# Patient Record
Sex: FEMALE | Race: WHITE | NOT HISPANIC OR LATINO | Employment: UNEMPLOYED | URBAN - METROPOLITAN AREA
[De-identification: names, ages, dates, MRNs, and addresses within clinical notes are randomized per-mention and may not be internally consistent; named-entity substitution may affect disease eponyms.]

---

## 2023-08-25 LAB
ANION GAP IN SER/PLAS: 14 MMOL/L (ref 10–20)
CALCIUM (MG/DL) IN SER/PLAS: 9.2 MG/DL (ref 8.6–10.3)
CARBON DIOXIDE, TOTAL (MMOL/L) IN SER/PLAS: 26 MMOL/L (ref 21–32)
CHLORIDE (MMOL/L) IN SER/PLAS: 104 MMOL/L (ref 98–107)
CREATININE (MG/DL) IN SER/PLAS: 0.98 MG/DL (ref 0.5–1.05)
GFR FEMALE: 59 ML/MIN/1.73M2
GLUCOSE (MG/DL) IN SER/PLAS: 88 MG/DL (ref 74–99)
POTASSIUM (MMOL/L) IN SER/PLAS: 4.8 MMOL/L (ref 3.5–5.3)
SODIUM (MMOL/L) IN SER/PLAS: 139 MMOL/L (ref 136–145)
UREA NITROGEN (MG/DL) IN SER/PLAS: 27 MG/DL (ref 6–23)

## 2023-11-09 DIAGNOSIS — Z95.810 CARDIAC DEFIBRILLATOR IN PLACE: Primary | ICD-10-CM

## 2023-11-12 PROBLEM — I10 HYPERTENSION: Status: ACTIVE | Noted: 2023-11-12

## 2023-11-12 PROBLEM — R42 VERTIGO: Status: ACTIVE | Noted: 2023-11-12

## 2023-11-12 PROBLEM — R06.02 SHORTNESS OF BREATH: Status: ACTIVE | Noted: 2023-11-12

## 2023-11-12 PROBLEM — I25.10 CORONARY ARTERY DISEASE INVOLVING NATIVE CORONARY ARTERY OF NATIVE HEART WITHOUT ANGINA PECTORIS: Status: ACTIVE | Noted: 2023-11-12

## 2023-11-12 PROBLEM — Z98.61 CAD S/P PERCUTANEOUS CORONARY ANGIOPLASTY: Status: ACTIVE | Noted: 2023-11-12

## 2023-11-12 PROBLEM — S92.902A FRACTURE OF FOOT, LEFT, CLOSED, INITIAL ENCOUNTER: Status: ACTIVE | Noted: 2023-11-12

## 2023-11-12 PROBLEM — Z95.810 CARDIAC DEFIBRILLATOR IN PLACE: Status: ACTIVE | Noted: 2023-11-12
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
## 2023-11-12 PROBLEM — M19.90 ARTHRITIS: Status: ACTIVE | Noted: 2023-11-12

## 2023-11-12 PROBLEM — I10 BENIGN ESSENTIAL HYPERTENSION: Status: ACTIVE | Noted: 2023-11-12

## 2023-11-12 PROBLEM — R73.9 HYPERGLYCEMIA: Status: ACTIVE | Noted: 2023-11-12

## 2023-11-12 PROBLEM — M54.30 SCIATIC PAIN: Status: ACTIVE | Noted: 2023-11-12

## 2023-11-12 PROBLEM — M75.80 ROTATOR CUFF TENDONITIS: Status: ACTIVE | Noted: 2023-11-12

## 2023-11-12 PROBLEM — S92.909A FRACTURE OF FOOT: Status: ACTIVE | Noted: 2023-11-12

## 2023-11-12 PROBLEM — K21.9 GASTROESOPHAGEAL REFLUX DISEASE: Status: ACTIVE | Noted: 2023-11-12

## 2023-11-12 PROBLEM — S99.912A ANKLE INJURY, LEFT, INITIAL ENCOUNTER: Status: ACTIVE | Noted: 2023-11-12

## 2023-11-12 PROBLEM — I50.22 CHRONIC SYSTOLIC HEART FAILURE (MULTI): Status: ACTIVE | Noted: 2023-11-12

## 2023-11-12 PROBLEM — I25.5 ISCHEMIC CARDIOMYOPATHY: Status: ACTIVE | Noted: 2023-11-12

## 2023-11-12 PROBLEM — I25.118 ATHEROSCLEROTIC HEART DISEASE OF NATIVE CORONARY ARTERY WITH OTHER FORMS OF ANGINA PECTORIS (CMS-HCC): Status: ACTIVE | Noted: 2023-11-12

## 2023-11-12 PROBLEM — I73.9 INTERMITTENT CLAUDICATION (CMS-HCC): Status: ACTIVE | Noted: 2023-11-12

## 2023-11-12 PROBLEM — E78.2 MIXED HYPERLIPIDEMIA: Status: ACTIVE | Noted: 2023-11-12

## 2023-11-12 PROBLEM — R55 VASOVAGAL SYNCOPE: Status: ACTIVE | Noted: 2023-11-12

## 2023-11-12 PROBLEM — I25.10 CAD S/P PERCUTANEOUS CORONARY ANGIOPLASTY: Status: ACTIVE | Noted: 2023-11-12

## 2023-11-12 PROBLEM — F17.200 CURRENT SMOKER: Status: ACTIVE | Noted: 2023-11-12

## 2023-11-12 PROBLEM — I47.20 SUSTAINED VENTRICULAR TACHYCARDIA (MULTI): Status: ACTIVE | Noted: 2023-11-12

## 2023-11-12 PROBLEM — I71.40 ABDOMINAL AORTIC ANEURYSM (AAA) WITHOUT RUPTURE (CMS-HCC): Status: ACTIVE | Noted: 2023-11-12

## 2023-11-12 PROBLEM — I49.01 VENTRICULAR FIBRILLATION (MULTI): Status: ACTIVE | Noted: 2023-11-12

## 2023-11-12 PROBLEM — I42.9 CARDIOMYOPATHY (MULTI): Status: ACTIVE | Noted: 2023-11-12

## 2023-11-12 RX ORDER — ROSUVASTATIN CALCIUM 20 MG/1
20 TABLET, COATED ORAL DAILY
COMMUNITY
End: 2023-11-14 | Stop reason: ALTCHOICE

## 2023-11-12 RX ORDER — CLOPIDOGREL BISULFATE 75 MG/1
75 TABLET ORAL DAILY
COMMUNITY

## 2023-11-12 RX ORDER — EPINEPHRINE 0.22MG
AEROSOL WITH ADAPTER (ML) INHALATION
COMMUNITY
Start: 2021-05-11

## 2023-11-12 RX ORDER — DAPAGLIFLOZIN 10 MG/1
10 TABLET, FILM COATED ORAL DAILY
COMMUNITY
End: 2024-04-11 | Stop reason: SDUPTHER

## 2023-11-12 RX ORDER — MEXILETINE HYDROCHLORIDE 200 MG/1
200 CAPSULE ORAL 3 TIMES DAILY
COMMUNITY
End: 2023-11-14 | Stop reason: DRUGHIGH

## 2023-11-12 RX ORDER — PRAVASTATIN SODIUM 40 MG/1
40 TABLET ORAL DAILY
COMMUNITY
End: 2023-11-14 | Stop reason: DRUGHIGH

## 2023-11-12 RX ORDER — PRAVASTATIN SODIUM 20 MG/1
20 TABLET ORAL DAILY
COMMUNITY
End: 2023-11-14 | Stop reason: SDUPTHER

## 2023-11-12 RX ORDER — METOPROLOL SUCCINATE 100 MG/1
50 TABLET, EXTENDED RELEASE ORAL DAILY
COMMUNITY

## 2023-11-12 RX ORDER — METOPROLOL SUCCINATE 200 MG/1
TABLET, EXTENDED RELEASE ORAL
COMMUNITY
Start: 2022-07-12 | End: 2023-11-14 | Stop reason: DRUGHIGH

## 2023-11-12 RX ORDER — SACUBITRIL AND VALSARTAN 24; 26 MG/1; MG/1
1 TABLET, FILM COATED ORAL 2 TIMES DAILY
COMMUNITY
End: 2024-05-06 | Stop reason: SDUPTHER

## 2023-11-12 RX ORDER — MEXILETINE HYDROCHLORIDE 150 MG/1
1 CAPSULE ORAL EVERY 8 HOURS
COMMUNITY
Start: 2012-12-08

## 2023-11-12 RX ORDER — AMIODARONE HYDROCHLORIDE 200 MG/1
200 TABLET ORAL DAILY
COMMUNITY
Start: 2022-07-12 | End: 2023-11-14 | Stop reason: ALTCHOICE

## 2023-11-12 RX ORDER — ASPIRIN 325 MG
1 TABLET ORAL DAILY
COMMUNITY
Start: 2022-07-12 | End: 2024-03-06 | Stop reason: ALTCHOICE

## 2023-11-14 ENCOUNTER — OFFICE VISIT (OUTPATIENT)
Dept: CARDIOLOGY | Facility: CLINIC | Age: 78
End: 2023-11-14
Payer: MEDICARE

## 2023-11-14 ENCOUNTER — HOSPITAL ENCOUNTER (OUTPATIENT)
Dept: CARDIOLOGY | Facility: CLINIC | Age: 78
Discharge: HOME | End: 2023-11-14
Payer: MEDICARE

## 2023-11-14 VITALS
TEMPERATURE: 98.6 F | RESPIRATION RATE: 16 BRPM | SYSTOLIC BLOOD PRESSURE: 133 MMHG | HEIGHT: 70 IN | DIASTOLIC BLOOD PRESSURE: 68 MMHG | HEART RATE: 69 BPM | WEIGHT: 142 LBS | BODY MASS INDEX: 20.33 KG/M2

## 2023-11-14 VITALS
DIASTOLIC BLOOD PRESSURE: 77 MMHG | WEIGHT: 143.6 LBS | SYSTOLIC BLOOD PRESSURE: 119 MMHG | HEART RATE: 72 BPM | OXYGEN SATURATION: 95 % | BODY MASS INDEX: 20.6 KG/M2

## 2023-11-14 DIAGNOSIS — I49.01 VENTRICULAR FIBRILLATION (MULTI): ICD-10-CM

## 2023-11-14 DIAGNOSIS — I47.20 SUSTAINED VENTRICULAR TACHYCARDIA (MULTI): ICD-10-CM

## 2023-11-14 DIAGNOSIS — E78.2 MIXED HYPERLIPIDEMIA: ICD-10-CM

## 2023-11-14 DIAGNOSIS — I42.8 OTHER CARDIOMYOPATHY (MULTI): ICD-10-CM

## 2023-11-14 DIAGNOSIS — I50.22 CHRONIC SYSTOLIC HEART FAILURE (MULTI): Primary | ICD-10-CM

## 2023-11-14 DIAGNOSIS — I25.5 ISCHEMIC CARDIOMYOPATHY: ICD-10-CM

## 2023-11-14 DIAGNOSIS — R55 VASOVAGAL SYNCOPE: ICD-10-CM

## 2023-11-14 DIAGNOSIS — E78.5 HYPERLIPIDEMIA: Primary | ICD-10-CM

## 2023-11-14 DIAGNOSIS — I73.9 INTERMITTENT CLAUDICATION (CMS-HCC): ICD-10-CM

## 2023-11-14 DIAGNOSIS — I42.9 CARDIOMYOPATHY, UNSPECIFIED TYPE (MULTI): ICD-10-CM

## 2023-11-14 PROCEDURE — 93005 ELECTROCARDIOGRAM TRACING: CPT | Performed by: INTERNAL MEDICINE

## 2023-11-14 PROCEDURE — 3075F SYST BP GE 130 - 139MM HG: CPT | Performed by: INTERNAL MEDICINE

## 2023-11-14 PROCEDURE — 93290 INTERROG DEV EVAL ICPMS IP: CPT

## 2023-11-14 PROCEDURE — 3078F DIAST BP <80 MM HG: CPT | Performed by: INTERNAL MEDICINE

## 2023-11-14 PROCEDURE — 99214 OFFICE O/P EST MOD 30 MIN: CPT | Performed by: INTERNAL MEDICINE

## 2023-11-14 PROCEDURE — 3074F SYST BP LT 130 MM HG: CPT | Performed by: INTERNAL MEDICINE

## 2023-11-14 PROCEDURE — 1126F AMNT PAIN NOTED NONE PRSNT: CPT | Performed by: INTERNAL MEDICINE

## 2023-11-14 PROCEDURE — 93290 INTERROG DEV EVAL ICPMS IP: CPT | Performed by: INTERNAL MEDICINE

## 2023-11-14 PROCEDURE — 1159F MED LIST DOCD IN RCRD: CPT | Performed by: INTERNAL MEDICINE

## 2023-11-14 PROCEDURE — 93283 PRGRMG EVAL IMPLANTABLE DFB: CPT | Performed by: INTERNAL MEDICINE

## 2023-11-14 PROCEDURE — 93005 ELECTROCARDIOGRAM TRACING: CPT

## 2023-11-14 PROCEDURE — 99214 OFFICE O/P EST MOD 30 MIN: CPT | Mod: 25 | Performed by: INTERNAL MEDICINE

## 2023-11-14 RX ORDER — PRAVASTATIN SODIUM 80 MG/1
80 TABLET ORAL DAILY
Qty: 90 TABLET | Refills: 3 | Status: SHIPPED | OUTPATIENT
Start: 2023-11-14 | End: 2024-04-10 | Stop reason: SDUPTHER

## 2023-11-14 ASSESSMENT — LIFESTYLE VARIABLES
SKIP TO QUESTIONS 9-10: 1
HOW OFTEN DO YOU HAVE A DRINK CONTAINING ALCOHOL: NEVER
HOW OFTEN DO YOU HAVE SIX OR MORE DRINKS ON ONE OCCASION: NEVER
AUDIT-C TOTAL SCORE: 0
HOW MANY STANDARD DRINKS CONTAINING ALCOHOL DO YOU HAVE ON A TYPICAL DAY: PATIENT DOES NOT DRINK

## 2023-11-14 ASSESSMENT — PATIENT HEALTH QUESTIONNAIRE - PHQ9
SUM OF ALL RESPONSES TO PHQ9 QUESTIONS 1 AND 2: 0
1. LITTLE INTEREST OR PLEASURE IN DOING THINGS: NOT AT ALL
SUM OF ALL RESPONSES TO PHQ9 QUESTIONS 1 AND 2: 0
2. FEELING DOWN, DEPRESSED OR HOPELESS: NOT AT ALL
1. LITTLE INTEREST OR PLEASURE IN DOING THINGS: NOT AT ALL
2. FEELING DOWN, DEPRESSED OR HOPELESS: NOT AT ALL

## 2023-11-14 ASSESSMENT — PAIN SCALES - GENERAL
PAINLEVEL: 0-NO PAIN
PAINLEVEL: 0-NO PAIN

## 2023-11-14 NOTE — PROGRESS NOTES
History of present illness:  Patient follow-up history of heart failure systolic dysfunction ejection fraction of 20% status post ICD for primary and secondary prevention on mexiletine follows up with Dr. Ding. History of ischemic cardiomyopathy. Last 2 D echo this year showed ejection fraction of 20%. Patient underwent percutaneous coronary intervention to chronically occluded LAD In June 2022. Did not improve significantly after the percutaneous coronary intervention. Patient is doing overall okay. Improving slightly. No chest pain.  Still complaining of shortness of breath with moderate activity NYHA class II-III.  Patient blood pressure is better.     Past Medical History:   Diagnosis Date    Abdominal aortic aneurysm (AAA) without rupture (CMS/[facility]) 11/12/2023    Atherosclerotic heart disease of native coronary artery with other forms of angina pectoris (CMS/[facility]) 11/12/2023    Benign essential hypertension 11/12/2023    CAD S/P percutaneous coronary angioplasty 11/12/2023    Cardiac defibrillator in place 11/12/2023    Chronic systolic heart failure (CMS/[facility]) 11/12/2023    Intermittent claudication (CMS/[facility]) 11/12/2023    Shortness of breath 11/12/2023    Sustained ventricular tachycardia (CMS/[facility]) 11/12/2023    Ventricular fibrillation (CMS/[facility]) 11/12/2023       Past Surgical History:   Procedure Laterality Date    CARDIAC DEFIBRILLATOR PLACEMENT      CHOLECYSTECTOMY      CORONARY ANGIOPLASTY WITH STENT PLACEMENT      HYSTERECTOMY      PACEMAKER PLACEMENT         Allergies   Allergen Reactions    Lisinopril Cough    Other Swelling     PCN  TONGUE SWELLING    Penicillins Unknown    Statins-Hmg-Coa Reductase Inhibitors Myalgia    Warfarin Unknown    Clopidogrel Rash     BLOOD BLISTERS        reports that she has been smoking cigarettes. She has been exposed to tobacco smoke. She has never used smokeless tobacco. She reports that she does not currently use alcohol. She reports current drug use.    Family History    Problem Relation Name Age of Onset    Cancer Mother  61    Heart attack Father  67       Patient's Medications   New Prescriptions    No medications on file   Previous Medications    ASPIRIN 325 MG TABLET    Take 1 tablet (325 mg) by mouth once daily.    CHOLECALCIFEROL, VITAMIN D3, (VITAMIN D3 ORAL)    Vitamin D3    CLOPIDOGREL (PLAVIX) 75 MG TABLET    Take 1 tablet (75 mg) by mouth once daily.    COENZYME Q-10 100 MG CAPSULE    Take by mouth.    ENTRESTO 24-26 MG TABLET    Take 1 tablet by mouth 2 times a day.    FARXIGA 10 MG    Take 1 tablet (10 mg) by mouth once daily.    METOPROLOL SUCCINATE XL (TOPROL-XL) 100 MG 24 HR TABLET    Take 1 tablet (100 mg) by mouth once daily.    MEXILETINE (MEXITIL) 150 MG CAPSULE    Take 1 capsule (150 mg) by mouth every 8 hours.    PRAVASTATIN (PRAVACHOL) 20 MG TABLET    Take 1 tablet (20 mg) by mouth once daily.   Modified Medications    No medications on file   Discontinued Medications    AMIODARONE (PACERONE) 200 MG TABLET    Take 1 tablet (200 mg) by mouth once daily.    METOPROLOL SUCCINATE XL (TOPROL-XL) 200 MG 24 HR TABLET    Metoprolol Succinate  MG Oral Tablet Extended Release 24 Hour   Refills: 0        Omar Malone   Start : 12-Jul-2022   Active    MEXILETINE (MEXITIL) 200 MG CAPSULE    Take 1 capsule (200 mg) by mouth 3 times a day.    PRAVASTATIN (PRAVACHOL) 40 MG TABLET    Take 1 tablet (40 mg) by mouth once daily.    ROSUVASTATIN (CRESTOR) 20 MG TABLET    Take 1 tablet (20 mg) by mouth once daily.    VITAMIN B COMPLEX/FOLIC ACID (B COMPLEX 100 ORAL)    B complex       Objective   Physical Exam  General: Patient in no acute distress   HEENT: Atraumatic normocephalic.  Neck: Supple, jugular venous pressure within normal limit.  No bruits  Lungs: Clear to auscultation bilaterally  Cardiovascular: Regular rate and rhythm, normal heart sounds, no murmurs rubs or gallops  Abdomen: Soft nontender nondistended.  Normal bowel sounds.  Extremities: Warm  to touch, no edema.    Lab Review   No visits with results within 2 Month(s) from this visit.   Latest known visit with results is:   Orders Only on 08/25/2023   Component Date Value    Glucose 08/25/2023 88     Sodium 08/25/2023 139     Potassium 08/25/2023 4.8     Chloride 08/25/2023 104     Bicarbonate 08/25/2023 26     Anion Gap 08/25/2023 14     Urea Nitrogen 08/25/2023 27 (H)     Creatinine 08/25/2023 0.98     GFR Female 08/25/2023 59 (A)     Calcium 08/25/2023 9.2         Assessment/Plan   Patient Active Problem List   Diagnosis    Abdominal aortic aneurysm (AAA) without rupture (CMS/HCC)    Benign essential hypertension    CAD S/P percutaneous coronary angioplasty    Cardiac defibrillator in place    Cardiomyopathy (CMS/HCC)    Chronic systolic heart failure (CMS/HCC)    Atherosclerotic heart disease of native coronary artery with other forms of angina pectoris (CMS/HCC)    Coronary artery disease involving native coronary artery of native heart without angina pectoris    Current smoker    Gastroesophageal reflux disease    Hyperglycemia    Hypertension    Ankle injury, left, initial encounter    Fracture of foot, left, closed, initial encounter    Fracture of foot    Intermittent claudication (CMS/HCC)    Ischemic cardiomyopathy    Mixed hyperlipidemia    Arthritis    Rotator cuff tendonitis    Sciatic pain    Shortness of breath    Sustained ventricular tachycardia (CMS/HCC)    Tick bite, initial encounter    Vasovagal syncope    Ventricular fibrillation (CMS/HCC)    Vertigo      Patient follow-up history of heart failure systolic dysfunction ejection fraction of 20% status post ICD for primary and secondary prevention on mexiletine follows up with Dr. Ding. History of ischemic cardiomyopathy. Last 2 D echo this year showed ejection fraction of 20%. Patient underwent percutaneous coronary intervention to chronically occluded LAD In June 2022. Did not improve significantly after the percutaneous coronary  intervention. Patient is doing overall okay. Improving slightly. No chest pain.  Still complaining of shortness of breath with moderate activity NYHA class II-III.  Patient blood pressure is better.  Denies any dizziness lightheadedness or syncope.  No lower extremity edema.  NYHA class II-III still smoking I think she has some underlying also COPD.  At this point she is on optimal medical therapy for heart failure systolic dysfunction.  She has appointment to see  today.  Recommend to continue current medications we will increase pravastatin to 80 mg oral daily.  She is intolerant to higher dose statin in the past.  Decrease aspirin to 81 mg oral daily.  Continue clopidogrel.  Continue other medications.  We will follow-up in 4 months.    Amos Moscoso MD

## 2023-11-14 NOTE — PROGRESS NOTES
Returns for follow up visit for    Chief Complaint   Patient presents with    Ventricular tacycardia     Patient returns to clinic for follow up. Patient reports shortness of breath with activity but denies palpitations, lightheadedness, syncope, orthopnea and chest pain/discomfort.  SUKI Morales RN         History Of Present Illness:    Nevaeh Pfeiffer is a 78 y.o. year old female patient     76 yo with history of nonsustained VT. She had ICD implanted in for ischemic cardiomyopathy, primary prevention, with replacement in 2016. She is on mexiletine for suppression.   The device was checked today. she was last checked in 2016 but then was lost to follow up for a short time. We are reconnecting her remote monitor.  2003 PCI s/p AWMI NSVT  The device went in for primary prevention but she had an event Jan 2021 - in which the device treated an episode of VT with ATP successfully.  She was asymptomatic.   No chest pain or pressure.  Past Medical History:  Past Medical History:   Diagnosis Date    Abdominal aortic aneurysm (AAA) without rupture (CMS/[facility]) 11/12/2023    Atherosclerotic heart disease of native coronary artery with other forms of angina pectoris (CMS/[facility]) 11/12/2023    Benign essential hypertension 11/12/2023    CAD S/P percutaneous coronary angioplasty 11/12/2023    Cardiac defibrillator in place 11/12/2023    Chronic systolic heart failure (CMS/[facility]) 11/12/2023    Intermittent claudication (CMS/[facility]) 11/12/2023    Shortness of breath 11/12/2023    Sustained ventricular tachycardia (CMS/[facility]) 11/12/2023    Ventricular fibrillation (CMS/[facility]) 11/12/2023       Past Surgical History:  Past Surgical History:   Procedure Laterality Date    CARDIAC DEFIBRILLATOR PLACEMENT      CHOLECYSTECTOMY      CORONARY ANGIOPLASTY WITH STENT PLACEMENT      HYSTERECTOMY      PACEMAKER PLACEMENT           Social History:  Caffeine: 1.5 cups coffee   Cigarettes: still smoking  ETOH: none  Drugs: none  Exercise: not regularly but  "active around house  Occ: r  Marital status: m    Family History:  Family History   Problem Relation Name Age of Onset    Cancer Mother  61    Heart attack Father  67         Allergies:  Allergies   Allergen Reactions    Lisinopril Cough    Other Swelling     PCN  TONGUE SWELLING    Penicillins Unknown    Statins-Hmg-Coa Reductase Inhibitors Myalgia    Warfarin Unknown    Clopidogrel Rash     BLOOD BLISTERS        Outpatient Medications:  Current Outpatient Medications   Medication Instructions    aspirin 325 mg tablet 1 tablet, oral, Daily    cholecalciferol, vitamin D3, (VITAMIN D3 ORAL) Take 1,000 Units by mouth once daily.    clopidogrel (PLAVIX) 75 mg, oral, Daily    coenzyme Q-10 100 mg capsule oral    Entresto 24-26 mg tablet 1 tablet, oral, 2 times daily    Farxiga 10 mg, oral, Daily    metoprolol succinate XL (TOPROL-XL) 100 mg, oral, Daily    mexiletine (Mexitil) 150 mg capsule 1 capsule, oral, Every 8 hours    pravastatin (PRAVACHOL) 80 mg, oral, Daily         Last Recorded Vitals:      9/20/2022    12:00 AM 10/3/2022    12:00 AM 11/11/2022     2:11 PM 1/24/2023    12:00 AM 5/17/2023    12:00 AM 11/14/2023     1:56 PM 11/14/2023     4:11 PM   Vitals   Systolic 100 107 147 116 114 133 119   Diastolic 76 72 72 93 62 68 77   Heart Rate 61 90 60 60 60 69 72   Temp 36.1 °C (97 °F)     37 °C (98.6 °F)    Resp 18 18  18 18 16    Height (in) 1.778 m (5' 10\") 1.753 m (5' 9\") 1.778 m (5' 10\") 1.753 m (5' 9\") 1.753 m (5' 9\") 1.778 m (5' 10\")    Weight (lb) 139.8 148 144 140 138 142 143.6   BMI 20.06 kg/m2 21.86 kg/m2 20.66 kg/m2 20.67 kg/m2 20.38 kg/m2 20.37 kg/m2 20.6 kg/m2   BSA (m2) 1.77 m2 1.81 m2 1.8 m2 1.76 m2 1.75 m2 1.78 m2 1.79 m2   Visit Report      Report    Report Report    Report        Physical Exam:  Physical Exam  Constitutional:       Appearance: Normal appearance.   HENT:      Head: Normocephalic.      Nose: Nose normal.   Neck:      Vascular: No carotid bruit.   Cardiovascular:      Rate and " Rhythm: Normal rate. Rhythm irregular.      Heart sounds: Normal heart sounds.   Pulmonary:      Breath sounds: Normal breath sounds.   Chest:          Comments: ICD well healed.  Musculoskeletal:         General: Normal range of motion.      Left lower leg: No edema.   Skin:     General: Skin is warm and dry.   Neurological:      General: No focal deficit present.      Mental Status: She is alert and oriented to person, place, and time.   Psychiatric:         Mood and Affect: Mood normal.         Behavior: Behavior normal.          Last Cardiology Tests:  ECG:    NSR frquent PVCs LAFB  msec    Echo:  6/2022  LVEF 20%      Cath:  Staged PCI 8/2022 and 9/2022  RCA and LAD -        ICD Check:  Battery 2 yr 9 mos   Non sustained VT up to 4 sec 214 bpm        Lab review: I have Chemistry CMP:   Lab Results   Component Value Date    ALBUMIN 3.9 06/21/2022    CALCIUM 9.2 08/25/2023    CO2 26 08/25/2023    CREATININE 0.98 08/25/2023    GLUCOSE 88 08/25/2023    BILITOT 0.5 06/21/2022    PROT 7.1 06/21/2022    ALT 14 06/21/2022    AST 66 (H) 06/21/2022    ALKPHOS 91 06/21/2022   , Chemistry BMP   Lab Results   Component Value Date    GLUCOSE 88 08/25/2023    CALCIUM 9.2 08/25/2023    CO2 26 08/25/2023    CREATININE 0.98 08/25/2023   , CBC:  Lab Results   Component Value Date    WBC 9.5 08/30/2022    RBC 5.48 (H) 08/30/2022    HGB 17.1 (H) 08/30/2022    HCT 51.1 (H) 08/30/2022    MCV 93.2 08/30/2022    MCH 31.2 08/30/2022    MCHC 33.5 08/30/2022    RDW 15.7 (H) 08/22/2022    MPV 10.0 08/30/2022    NRBC 0 08/30/2022   , Coags:   Lab Results   Component Value Date    APTT 31 08/22/2022    INR 0.9 08/22/2022   , and Lipids:   Lab Results   Component Value Date    CHOL 200 06/22/2022    HDL 54 06/22/2022    LDLCALC 127 06/22/2022    TRIG 97 06/22/2022       Assessment/Plan   Problem List Items Addressed This Visit             ICD-10-CM       Cardiac and Vasculature    Cardiomyopathy (CMS/HCC) I42.9    Relevant Orders     Transthoracic Echo (TTE) Complete    B-Type Natriuretic Peptide    Chronic systolic heart failure (CMS/HCC) - Primary I50.22    Relevant Orders    Transthoracic Echo (TTE) Complete    B-Type Natriuretic Peptide    Sustained ventricular tachycardia (CMS/HCC) I47.20    Relevant Orders    ECG 12 lead (Clinic Performed)    Transthoracic Echo (TTE) Complete   Remote ICD in 3 months. Continue mexiletine.     Marissa Ojeda MD

## 2023-11-20 ENCOUNTER — LAB (OUTPATIENT)
Dept: LAB | Facility: LAB | Age: 78
End: 2023-11-20
Payer: MEDICARE

## 2023-11-20 DIAGNOSIS — I42.8 OTHER CARDIOMYOPATHY (MULTI): ICD-10-CM

## 2023-11-20 DIAGNOSIS — I50.22 CHRONIC SYSTOLIC HEART FAILURE (MULTI): ICD-10-CM

## 2023-11-20 LAB — BNP SERPL-MCNC: 466 PG/ML (ref 0–99)

## 2023-11-20 PROCEDURE — 36415 COLL VENOUS BLD VENIPUNCTURE: CPT

## 2023-11-20 PROCEDURE — 83880 ASSAY OF NATRIURETIC PEPTIDE: CPT

## 2023-12-08 DIAGNOSIS — E78.00 ELEVATED CHOLESTEROL: Primary | ICD-10-CM

## 2023-12-11 RX ORDER — PRAVASTATIN SODIUM 20 MG/1
20 TABLET ORAL DAILY
Qty: 90 TABLET | Refills: 0 | Status: SHIPPED | OUTPATIENT
Start: 2023-12-11

## 2023-12-11 NOTE — TELEPHONE ENCOUNTER
Patients daughter aware. She scheduled an appointment for Tuesday 01/23/2024. Daughter wants to talk with provider prior to appointment about some changes she is noticing with her mother. TM

## 2024-01-23 ENCOUNTER — APPOINTMENT (OUTPATIENT)
Dept: PRIMARY CARE | Facility: CLINIC | Age: 79
End: 2024-01-23
Payer: MEDICARE

## 2024-02-19 ENCOUNTER — HOSPITAL ENCOUNTER (OUTPATIENT)
Dept: CARDIOLOGY | Facility: CLINIC | Age: 79
Discharge: HOME | End: 2024-02-19
Payer: MEDICARE

## 2024-02-19 DIAGNOSIS — I42.5 OTHER RESTRICTIVE CARDIOMYOPATHY (MULTI): ICD-10-CM

## 2024-02-19 DIAGNOSIS — Z95.810 CARDIAC DEFIBRILLATOR IN PLACE: ICD-10-CM

## 2024-02-19 PROCEDURE — 93296 REM INTERROG EVL PM/IDS: CPT

## 2024-02-19 PROCEDURE — 93295 DEV INTERROG REMOTE 1/2/MLT: CPT | Performed by: INTERNAL MEDICINE

## 2024-03-05 PROBLEM — I47.20 VENTRICULAR TACHYCARDIA (MULTI): Status: ACTIVE | Noted: 2022-06-22

## 2024-03-05 PROBLEM — I25.5 ISCHEMIC MYOCARDIAL DYSFUNCTION: Status: ACTIVE | Noted: 2022-06-22

## 2024-03-05 PROBLEM — I21.4 NON-ST ELEVATION (NSTEMI) MYOCARDIAL INFARCTION (MULTI): Status: ACTIVE | Noted: 2022-06-22

## 2024-03-05 PROBLEM — J11.1 INFLUENZA: Status: ACTIVE | Noted: 2024-03-05

## 2024-03-05 PROBLEM — E78.00 HYPERCHOLESTEROLEMIA: Status: ACTIVE | Noted: 2022-06-22

## 2024-03-05 PROBLEM — Z86.79 HISTORY OF SUSTAINED VENTRICULAR TACHYCARDIA: Status: ACTIVE | Noted: 2024-03-05

## 2024-03-05 PROBLEM — I71.40 ABDOMINAL AORTIC ANEURYSM (AAA) (CMS-HCC): Status: ACTIVE | Noted: 2021-11-26

## 2024-03-05 RX ORDER — ALIROCUMAB 150 MG/ML
1 INJECTION, SOLUTION SUBCUTANEOUS
COMMUNITY
Start: 2019-04-24

## 2024-03-05 RX ORDER — SPIRONOLACTONE 25 MG/1
12.5 TABLET ORAL DAILY
COMMUNITY
Start: 2019-02-27

## 2024-03-05 RX ORDER — MECLIZINE HYDROCHLORIDE 25 MG/1
25 TABLET ORAL
COMMUNITY
Start: 2021-05-11

## 2024-03-05 RX ORDER — ISOSORBIDE MONONITRATE 30 MG/1
30 TABLET, EXTENDED RELEASE ORAL DAILY
COMMUNITY
Start: 2019-04-24

## 2024-03-05 RX ORDER — HYDRALAZINE HYDROCHLORIDE 10 MG/1
25 TABLET, FILM COATED ORAL 2 TIMES DAILY
COMMUNITY
Start: 2019-04-24

## 2024-03-06 ENCOUNTER — OFFICE VISIT (OUTPATIENT)
Dept: CARDIOLOGY | Facility: CLINIC | Age: 79
End: 2024-03-06
Payer: MEDICARE

## 2024-03-06 VITALS
HEART RATE: 60 BPM | WEIGHT: 141 LBS | OXYGEN SATURATION: 97 % | DIASTOLIC BLOOD PRESSURE: 49 MMHG | TEMPERATURE: 98.9 F | SYSTOLIC BLOOD PRESSURE: 70 MMHG | HEIGHT: 70 IN | BODY MASS INDEX: 20.19 KG/M2 | RESPIRATION RATE: 18 BRPM

## 2024-03-06 DIAGNOSIS — I25.10 CORONARY ARTERY DISEASE INVOLVING NATIVE CORONARY ARTERY OF NATIVE HEART WITHOUT ANGINA PECTORIS: ICD-10-CM

## 2024-03-06 DIAGNOSIS — E78.00 HYPERCHOLESTEROLEMIA: ICD-10-CM

## 2024-03-06 DIAGNOSIS — I10 PRIMARY HYPERTENSION: ICD-10-CM

## 2024-03-06 DIAGNOSIS — I25.10 CAD S/P PERCUTANEOUS CORONARY ANGIOPLASTY: ICD-10-CM

## 2024-03-06 DIAGNOSIS — Z86.79 HISTORY OF SUSTAINED VENTRICULAR TACHYCARDIA: ICD-10-CM

## 2024-03-06 DIAGNOSIS — E78.2 MIXED HYPERLIPIDEMIA: Primary | ICD-10-CM

## 2024-03-06 DIAGNOSIS — Z98.61 CAD S/P PERCUTANEOUS CORONARY ANGIOPLASTY: ICD-10-CM

## 2024-03-06 DIAGNOSIS — Z95.810 CARDIAC DEFIBRILLATOR IN PLACE: ICD-10-CM

## 2024-03-06 DIAGNOSIS — I25.118 ATHEROSCLEROTIC HEART DISEASE OF NATIVE CORONARY ARTERY WITH OTHER FORMS OF ANGINA PECTORIS (CMS-HCC): ICD-10-CM

## 2024-03-06 PROCEDURE — 99214 OFFICE O/P EST MOD 30 MIN: CPT | Performed by: INTERNAL MEDICINE

## 2024-03-06 PROCEDURE — 3074F SYST BP LT 130 MM HG: CPT | Performed by: INTERNAL MEDICINE

## 2024-03-06 PROCEDURE — 1126F AMNT PAIN NOTED NONE PRSNT: CPT | Performed by: INTERNAL MEDICINE

## 2024-03-06 PROCEDURE — 1159F MED LIST DOCD IN RCRD: CPT | Performed by: INTERNAL MEDICINE

## 2024-03-06 PROCEDURE — 3078F DIAST BP <80 MM HG: CPT | Performed by: INTERNAL MEDICINE

## 2024-03-06 RX ORDER — ASPIRIN 81 MG/1
81 TABLET ORAL DAILY
COMMUNITY

## 2024-03-06 ASSESSMENT — PATIENT HEALTH QUESTIONNAIRE - PHQ9
SUM OF ALL RESPONSES TO PHQ9 QUESTIONS 1 AND 2: 0
1. LITTLE INTEREST OR PLEASURE IN DOING THINGS: NOT AT ALL
2. FEELING DOWN, DEPRESSED OR HOPELESS: NOT AT ALL

## 2024-03-06 ASSESSMENT — PAIN SCALES - GENERAL: PAINLEVEL: 0-NO PAIN

## 2024-03-06 NOTE — PROGRESS NOTES
History of present illness:  Patient follow-up history of heart failure systolic dysfunction ejection fraction of 20% status post ICD for primary and secondary prevention on mexiletine follows up with Dr. Tripathi. History of ischemic cardiomyopathy. Last 2 D echo this year showed ejection fraction of 20%. Patient underwent percutaneous coronary intervention to chronically occluded LAD In June 2022.  Patient is NYHA class II-III.  Denies having chest pain.  She does report shortness of breath with mild to moderate activity.  No lower extremity edema PND dizziness or device shocks.  She is on mexiletine for history of V. tach.    Past Medical History:   Diagnosis Date    Abdominal aortic aneurysm (AAA) without rupture (CMS/[facility]) 11/12/2023    Atherosclerotic heart disease of native coronary artery with other forms of angina pectoris (CMS/[facility]) 11/12/2023    Benign essential hypertension 11/12/2023    CAD S/P percutaneous coronary angioplasty 11/12/2023    Cardiac defibrillator in place 11/12/2023    Chronic systolic heart failure (CMS/[facility]) 11/12/2023    Intermittent claudication (CMS/[facility]) 11/12/2023    Shortness of breath 11/12/2023    Sustained ventricular tachycardia (CMS/[facility]) 11/12/2023    Ventricular fibrillation (CMS/[facility]) 11/12/2023       Past Surgical History:   Procedure Laterality Date    CARDIAC DEFIBRILLATOR PLACEMENT      CHOLECYSTECTOMY      CORONARY ANGIOPLASTY WITH STENT PLACEMENT      HYSTERECTOMY      PACEMAKER PLACEMENT         Allergies   Allergen Reactions    Lisinopril Cough    Other Swelling     PCN  TONGUE SWELLING    Penicillins Unknown    Statins-Hmg-Coa Reductase Inhibitors Myalgia    Warfarin Unknown     Blood Blisters    Clopidogrel Rash     BLOOD BLISTERS        reports that she has been smoking cigarettes. She has been exposed to tobacco smoke. She has never used smokeless tobacco. She reports that she does not currently use alcohol. She reports that she does not use drugs.    Family History    Problem Relation Name Age of Onset    Cancer Mother  61    Heart attack Father  67       Patient's Medications   New Prescriptions    No medications on file   Previous Medications    ALIROCUMAB (PRALUENT PEN) 150 MG/ML PEN INJECTOR    Inject 1 mL (150 mg) under the skin 1 (one) time per week.    ASPIRIN 325 MG TABLET    Take 1 tablet (325 mg) by mouth once daily.    ASPIRIN 81 MG EC TABLET    Take 1 tablet (81 mg) by mouth once daily.    CHOLECALCIFEROL, VITAMIN D3, (VITAMIN D3 ORAL)    Take 1,000 Units by mouth once daily.    CLOPIDOGREL (PLAVIX) 75 MG TABLET    Take 1 tablet (75 mg) by mouth once daily.    COENZYME Q-10 100 MG CAPSULE    Take by mouth.    ENTRESTO 24-26 MG TABLET    Take 1 tablet by mouth 2 times a day.    FARXIGA 10 MG    Take 1 tablet (10 mg) by mouth once daily.    HYDRALAZINE (APRESOLINE) 10 MG TABLET    Take 2.5 tablets (25 mg) by mouth 2 times a day.    ISOSORBIDE MONONITRATE ER (IMDUR) 30 MG 24 HR TABLET    Take 1 tablet (30 mg) by mouth once daily.    MECLIZINE (ANTIVERT) 25 MG TABLET    Take 1 tablet (25 mg) by mouth. As directed    METOPROLOL SUCCINATE XL (TOPROL-XL) 100 MG 24 HR TABLET    Take 1 tablet (100 mg) by mouth once daily.    MEXILETINE (MEXITIL) 150 MG CAPSULE    Take 1 capsule (150 mg) by mouth every 8 hours.    PRAVASTATIN (PRAVACHOL) 20 MG TABLET    Take 1 tablet by mouth once daily    PRAVASTATIN (PRAVACHOL) 80 MG TABLET    Take 1 tablet (80 mg) by mouth once daily.    SPIRONOLACTONE (ALDACTONE) 25 MG TABLET    Take 0.5 tablets (12.5 mg) by mouth once daily.   Modified Medications    No medications on file   Discontinued Medications    No medications on file       Objective   Physical Exam  General: Patient in no acute distress   HEENT: Atraumatic normocephalic.  Neck: Supple, jugular venous pressure within normal limit.  No bruits  Lungs: Clear to auscultation bilaterally  Cardiovascular: Regular rate and rhythm, normal heart sounds, no murmurs rubs or gallops  Abdomen:  Soft nontender nondistended.  Normal bowel sounds.  Extremities: Warm to touch, no edema.        Lab Review   No visits with results within 2 Month(s) from this visit.   Latest known visit with results is:   Lab on 11/20/2023   Component Date Value    BNP 11/20/2023 466 (H)         Assessment/Plan   Patient Active Problem List   Diagnosis    Abdominal aortic aneurysm (AAA) (CMS/HCC)    Benign essential hypertension    CAD S/P percutaneous coronary angioplasty    Cardiac defibrillator in place    Cardiomyopathy (CMS/HCC)    Chronic systolic heart failure (CMS/HCC)    Atherosclerotic heart disease of native coronary artery with other forms of angina pectoris (CMS/HCC)    Coronary artery disease involving native coronary artery of native heart without angina pectoris    Current smoker    Gastroesophageal reflux disease    Hyperglycemia    Hypertension    Ankle injury, left, initial encounter    Fracture of foot, left, closed, initial encounter    Fracture of foot    Intermittent claudication (CMS/HCC)    Ischemic myocardial dysfunction    Hypercholesterolemia    Arthritis    Rotator cuff tendinitis    Sciatica    Shortness of breath    Ventricular tachycardia (CMS/HCC)    Tick bite    Vasovagal syncope    Ventricular fibrillation (CMS/[facility])    Vertigo    Influenza    History of sustained ventricular tachycardia    Non-ST elevation (NSTEMI) myocardial infarction (CMS/[facility])    History of present illness:    Past Medical History:   Diagnosis Date    Abdominal aortic aneurysm (AAA) without rupture (CMS/HCC) 11/12/2023    Atherosclerotic heart disease of native coronary artery with other forms of angina pectoris (CMS/HCC) 11/12/2023    Benign essential hypertension 11/12/2023    CAD S/P percutaneous coronary angioplasty 11/12/2023    Cardiac defibrillator in place 11/12/2023    Chronic systolic heart failure (CMS/HCC) 11/12/2023    Intermittent claudication (CMS/[facility]) 11/12/2023    Shortness of breath 11/12/2023    Sustained  ventricular tachycardia (CMS/HCC) 11/12/2023    Ventricular fibrillation (CMS/HCC) 11/12/2023       Past Surgical History:   Procedure Laterality Date    CARDIAC DEFIBRILLATOR PLACEMENT      CHOLECYSTECTOMY      CORONARY ANGIOPLASTY WITH STENT PLACEMENT      HYSTERECTOMY      PACEMAKER PLACEMENT         Allergies   Allergen Reactions    Lisinopril Cough    Other Swelling     PCN  TONGUE SWELLING    Penicillins Unknown    Statins-Hmg-Coa Reductase Inhibitors Myalgia    Warfarin Unknown     Blood Blisters    Clopidogrel Rash     BLOOD BLISTERS        reports that she has been smoking cigarettes. She has been exposed to tobacco smoke. She has never used smokeless tobacco. She reports that she does not currently use alcohol. She reports that she does not use drugs.    Family History   Problem Relation Name Age of Onset    Cancer Mother  61    Heart attack Father  67       Patient's Medications   New Prescriptions    No medications on file   Previous Medications    ALIROCUMAB (PRALUENT PEN) 150 MG/ML PEN INJECTOR    Inject 1 mL (150 mg) under the skin 1 (one) time per week.    ASPIRIN 325 MG TABLET    Take 1 tablet (325 mg) by mouth once daily.    ASPIRIN 81 MG EC TABLET    Take 1 tablet (81 mg) by mouth once daily.    CHOLECALCIFEROL, VITAMIN D3, (VITAMIN D3 ORAL)    Take 1,000 Units by mouth once daily.    CLOPIDOGREL (PLAVIX) 75 MG TABLET    Take 1 tablet (75 mg) by mouth once daily.    COENZYME Q-10 100 MG CAPSULE    Take by mouth.    ENTRESTO 24-26 MG TABLET    Take 1 tablet by mouth 2 times a day.    FARXIGA 10 MG    Take 1 tablet (10 mg) by mouth once daily.    HYDRALAZINE (APRESOLINE) 10 MG TABLET    Take 2.5 tablets (25 mg) by mouth 2 times a day.    ISOSORBIDE MONONITRATE ER (IMDUR) 30 MG 24 HR TABLET    Take 1 tablet (30 mg) by mouth once daily.    MECLIZINE (ANTIVERT) 25 MG TABLET    Take 1 tablet (25 mg) by mouth. As directed    METOPROLOL SUCCINATE XL (TOPROL-XL) 100 MG 24 HR TABLET    Take 1 tablet (100  mg) by mouth once daily.    MEXILETINE (MEXITIL) 150 MG CAPSULE    Take 1 capsule (150 mg) by mouth every 8 hours.    PRAVASTATIN (PRAVACHOL) 20 MG TABLET    Take 1 tablet by mouth once daily    PRAVASTATIN (PRAVACHOL) 80 MG TABLET    Take 1 tablet (80 mg) by mouth once daily.    SPIRONOLACTONE (ALDACTONE) 25 MG TABLET    Take 0.5 tablets (12.5 mg) by mouth once daily.   Modified Medications    No medications on file   Discontinued Medications    No medications on file       Objective   Physical Exam  General: Patient in no acute distress   HEENT: Atraumatic normocephalic.  Neck: Supple, jugular venous pressure within normal limit.  No bruits  Lungs: Clear to auscultation bilaterally  Cardiovascular: Regular rate and rhythm, normal heart sounds, no murmurs rubs or gallops  Abdomen: Soft nontender nondistended.  Normal bowel sounds.  Extremities: Warm to touch, no edema.    Lab Review   No visits with results within 2 Month(s) from this visit.   Latest known visit with results is:   Lab on 11/20/2023   Component Date Value    BNP 11/20/2023 466 (H)         Assessment/Plan   Patient Active Problem List   Diagnosis    Abdominal aortic aneurysm (AAA) (CMS/HCC)    Benign essential hypertension    CAD S/P percutaneous coronary angioplasty    Cardiac defibrillator in place    Cardiomyopathy (CMS/HCC)    Chronic systolic heart failure (CMS/HCC)    Atherosclerotic heart disease of native coronary artery with other forms of angina pectoris (CMS/HCC)    Coronary artery disease involving native coronary artery of native heart without angina pectoris    Current smoker    Gastroesophageal reflux disease    Hyperglycemia    Hypertension    Ankle injury, left, initial encounter    Fracture of foot, left, closed, initial encounter    Fracture of foot    Intermittent claudication (CMS/HCC)    Ischemic myocardial dysfunction    Hypercholesterolemia    Arthritis    Rotator cuff tendinitis    Sciatica    Shortness of breath     Ventricular tachycardia (CMS/HCC)    Tick bite    Vasovagal syncope    Ventricular fibrillation (CMS/HCC)    Vertigo    Influenza    History of sustained ventricular tachycardia    Non-ST elevation (NSTEMI) myocardial infarction (CMS/HCC)      Patient follow-up history of heart failure systolic dysfunction ejection fraction of 20% status post ICD for primary and secondary prevention on mexiletine follows up with Dr. Tripathi. History of ischemic cardiomyopathy. Last 2 D echo this year showed ejection fraction of 20%. Patient underwent percutaneous coronary intervention to chronically occluded LAD In June 2022.  Patient is NYHA class II-III.  Denies having chest pain.  She does report shortness of breath with mild to moderate activity.  No lower extremity edema PND dizziness or device shocks.  She is on mexiletine for history of V. tach.  At this point patient is optimal medical therapy.  I will check again lipid panel to see if her lipid better but she has been refusing to be on any additional therapy for cholesterol.  She is not back to be referred for advanced therapy and I think the only option she would have is LVAD which I do not think she is worse functionally to receive that.  Discussed with her in length lifestyle modification.  Will follow-up in 4 months.  Will check lipid panel and basic metabolic panel.    Amos Moscoso MD

## 2024-03-18 DIAGNOSIS — E78.00 ELEVATED CHOLESTEROL: ICD-10-CM

## 2024-03-18 RX ORDER — PRAVASTATIN SODIUM 20 MG/1
20 TABLET ORAL DAILY
Qty: 90 TABLET | Refills: 0 | Status: CANCELLED | OUTPATIENT
Start: 2024-03-18

## 2024-03-18 NOTE — TELEPHONE ENCOUNTER
Spoke with patent and she states that she is able to take the pravastatin but she states that she has plenty right now and does not need the refill. PL

## 2024-03-19 ENCOUNTER — APPOINTMENT (OUTPATIENT)
Dept: CARDIOLOGY | Facility: CLINIC | Age: 79
End: 2024-03-19
Payer: MEDICARE

## 2024-03-22 ENCOUNTER — LAB (OUTPATIENT)
Dept: LAB | Facility: LAB | Age: 79
End: 2024-03-22
Payer: MEDICARE

## 2024-03-22 DIAGNOSIS — E78.2 MIXED HYPERLIPIDEMIA: ICD-10-CM

## 2024-03-22 LAB
ANION GAP SERPL CALC-SCNC: 14 MMOL/L (ref 10–20)
BUN SERPL-MCNC: 18 MG/DL (ref 6–23)
CALCIUM SERPL-MCNC: 9.5 MG/DL (ref 8.6–10.3)
CHLORIDE SERPL-SCNC: 104 MMOL/L (ref 98–107)
CHOLEST SERPL-MCNC: 216 MG/DL (ref 0–199)
CHOLESTEROL/HDL RATIO: 3.5
CO2 SERPL-SCNC: 27 MMOL/L (ref 21–32)
CREAT SERPL-MCNC: 0.8 MG/DL (ref 0.5–1.05)
EGFRCR SERPLBLD CKD-EPI 2021: 76 ML/MIN/1.73M*2
GLUCOSE SERPL-MCNC: 92 MG/DL (ref 74–99)
HDLC SERPL-MCNC: 61.2 MG/DL
LDLC SERPL CALC-MCNC: 136 MG/DL
NON HDL CHOLESTEROL: 155 MG/DL (ref 0–149)
POTASSIUM SERPL-SCNC: 4.8 MMOL/L (ref 3.5–5.3)
SODIUM SERPL-SCNC: 140 MMOL/L (ref 136–145)
TRIGL SERPL-MCNC: 94 MG/DL (ref 0–149)
VLDL: 19 MG/DL (ref 0–40)

## 2024-03-22 PROCEDURE — 80061 LIPID PANEL: CPT

## 2024-03-22 PROCEDURE — 36415 COLL VENOUS BLD VENIPUNCTURE: CPT

## 2024-03-22 PROCEDURE — 80048 BASIC METABOLIC PNL TOTAL CA: CPT

## 2024-03-26 LAB
ATRIAL RATE: 72 BPM
P AXIS: 71 DEGREES
P OFFSET: 201 MS
P ONSET: 141 MS
PR INTERVAL: 168 MS
Q ONSET: 225 MS
QRS COUNT: 12 BEATS
QRS DURATION: 112 MS
QT INTERVAL: 390 MS
QTC CALCULATION(BAZETT): 427 MS
QTC FREDERICIA: 414 MS
R AXIS: -79 DEGREES
T AXIS: 94 DEGREES
T OFFSET: 420 MS
VENTRICULAR RATE: 72 BPM

## 2024-04-02 ENCOUNTER — TELEPHONE (OUTPATIENT)
Dept: CARDIOLOGY | Facility: CLINIC | Age: 79
End: 2024-04-02
Payer: MEDICARE

## 2024-04-02 NOTE — TELEPHONE ENCOUNTER
Pt notified and satisfied with result\ comments from provider. She will also think about taking Repatha and let provider know decision            ----- Message from Amos Moscoso MD sent at 3/25/2024  3:11 PM EDT -----  Tell patient that her cholesterol is still not at target.  Asked her to take every day her pravastatin.  If she is interested in Repatha injections every 2 weeks. let me know and I will send it to her.  ----- Message -----  From: Lab, Background User  Sent: 3/22/2024  10:57 PM EDT  To: mAos Moscoso MD

## 2024-04-10 ENCOUNTER — TELEPHONE (OUTPATIENT)
Dept: CARDIOLOGY | Facility: CLINIC | Age: 79
End: 2024-04-10
Payer: MEDICARE

## 2024-04-10 DIAGNOSIS — E78.5 HYPERLIPIDEMIA: ICD-10-CM

## 2024-04-10 DIAGNOSIS — I50.20 SYSTOLIC CONGESTIVE HEART FAILURE, UNSPECIFIED HF CHRONICITY (MULTI): Primary | ICD-10-CM

## 2024-04-10 NOTE — TELEPHONE ENCOUNTER
Patient is requesting a refill of the following medication(s) for a 90 day supply with refills.   Please send the attached prescription(s) as soon as possible.   Thank you.     Requested Prescriptions     Pending Prescriptions Disp Refills    pravastatin (Pravachol) 80 mg tablet 90 tablet 3     Sig: Take 1 tablet (80 mg) by mouth once daily.

## 2024-04-10 NOTE — TELEPHONE ENCOUNTER
Patient called stating that [facility] needs a new paper script faxed to them to refill her medication. I informed the patient that I will see Nasif tomorrow afternoon and will have him sign a script at that time.     Fax to [phone]

## 2024-04-11 RX ORDER — PRAVASTATIN SODIUM 80 MG/1
80 TABLET ORAL DAILY
Qty: 90 TABLET | Refills: 3 | Status: SHIPPED | OUTPATIENT
Start: 2024-04-11 | End: 2025-04-11

## 2024-04-11 RX ORDER — DAPAGLIFLOZIN 10 MG/1
10 TABLET, FILM COATED ORAL DAILY
Qty: 90 TABLET | Refills: 3 | Status: SHIPPED | OUTPATIENT
Start: 2024-04-11 | End: 2025-04-11

## 2024-05-06 DIAGNOSIS — I25.118 ATHEROSCLEROTIC HEART DISEASE OF NATIVE CORONARY ARTERY WITH OTHER FORMS OF ANGINA PECTORIS (CMS-HCC): ICD-10-CM

## 2024-05-06 RX ORDER — SACUBITRIL AND VALSARTAN 24; 26 MG/1; MG/1
1 TABLET, FILM COATED ORAL 2 TIMES DAILY
Qty: 180 TABLET | Refills: 3 | Status: SHIPPED | OUTPATIENT
Start: 2024-05-06 | End: 2025-05-01

## 2024-05-06 NOTE — TELEPHONE ENCOUNTER
Nevaeh Pfeiffer  has called in to request a refill on her:    Entresto     Medication sent to pharmacy and Nevaeh Pfeiffer  will be notified of when available for / has been sent out for delivery        Requested Prescriptions     Pending Prescriptions Disp Refills    Entresto 24-26 mg tablet 180 tablet 3     Sig: Take 1 tablet by mouth 2 times a day.

## 2024-05-06 NOTE — TELEPHONE ENCOUNTER
Med refill:    Entresto 24-26 mg   1 Tab PO 2 x daily    90 day supply  [facility]  [place], [place]  [phone]

## 2024-05-20 ENCOUNTER — HOSPITAL ENCOUNTER (OUTPATIENT)
Dept: CARDIOLOGY | Facility: CLINIC | Age: 79
Discharge: HOME | End: 2024-05-20
Payer: MEDICARE

## 2024-05-20 DIAGNOSIS — Z95.810 CARDIAC DEFIBRILLATOR IN PLACE: ICD-10-CM

## 2024-05-20 PROCEDURE — 93296 REM INTERROG EVL PM/IDS: CPT

## 2024-07-03 RX ORDER — ROSUVASTATIN CALCIUM 20 MG/1
TABLET, COATED ORAL EVERY 24 HOURS
COMMUNITY

## 2024-07-03 RX ORDER — AMIODARONE HYDROCHLORIDE 200 MG/1
TABLET ORAL
COMMUNITY
Start: 2022-07-12

## 2024-07-08 ENCOUNTER — APPOINTMENT (OUTPATIENT)
Dept: CARDIOLOGY | Facility: CLINIC | Age: 79
End: 2024-07-08
Payer: MEDICARE

## 2024-07-08 VITALS
RESPIRATION RATE: 18 BRPM | OXYGEN SATURATION: 98 % | SYSTOLIC BLOOD PRESSURE: 125 MMHG | BODY MASS INDEX: 19.47 KG/M2 | HEART RATE: 68 BPM | TEMPERATURE: 98.9 F | WEIGHT: 136 LBS | HEIGHT: 70 IN | DIASTOLIC BLOOD PRESSURE: 67 MMHG

## 2024-07-08 DIAGNOSIS — I10 PRIMARY HYPERTENSION: ICD-10-CM

## 2024-07-08 DIAGNOSIS — I73.9 INTERMITTENT CLAUDICATION (CMS-HCC): ICD-10-CM

## 2024-07-08 DIAGNOSIS — Z86.79 HISTORY OF SUSTAINED VENTRICULAR TACHYCARDIA: ICD-10-CM

## 2024-07-08 DIAGNOSIS — I25.5 ISCHEMIC MYOCARDIAL DYSFUNCTION: ICD-10-CM

## 2024-07-08 DIAGNOSIS — E78.00 HYPERCHOLESTEROLEMIA: ICD-10-CM

## 2024-07-08 DIAGNOSIS — I47.20 VENTRICULAR TACHYCARDIA (MULTI): Primary | ICD-10-CM

## 2024-07-08 DIAGNOSIS — E78.2 MIXED HYPERLIPIDEMIA: ICD-10-CM

## 2024-07-08 DIAGNOSIS — I21.4 NON-ST ELEVATION (NSTEMI) MYOCARDIAL INFARCTION (MULTI): ICD-10-CM

## 2024-07-08 DIAGNOSIS — R55 VASOVAGAL SYNCOPE: ICD-10-CM

## 2024-07-08 DIAGNOSIS — I49.01 VENTRICULAR FIBRILLATION (MULTI): ICD-10-CM

## 2024-07-08 PROCEDURE — 3074F SYST BP LT 130 MM HG: CPT | Performed by: INTERNAL MEDICINE

## 2024-07-08 PROCEDURE — 1160F RVW MEDS BY RX/DR IN RCRD: CPT | Performed by: INTERNAL MEDICINE

## 2024-07-08 PROCEDURE — 1126F AMNT PAIN NOTED NONE PRSNT: CPT | Performed by: INTERNAL MEDICINE

## 2024-07-08 PROCEDURE — 1159F MED LIST DOCD IN RCRD: CPT | Performed by: INTERNAL MEDICINE

## 2024-07-08 PROCEDURE — 3078F DIAST BP <80 MM HG: CPT | Performed by: INTERNAL MEDICINE

## 2024-07-08 PROCEDURE — 99214 OFFICE O/P EST MOD 30 MIN: CPT | Performed by: INTERNAL MEDICINE

## 2024-07-08 ASSESSMENT — ENCOUNTER SYMPTOMS
DEPRESSION: 0
OCCASIONAL FEELINGS OF UNSTEADINESS: 0
LOSS OF SENSATION IN FEET: 0

## 2024-07-08 ASSESSMENT — PATIENT HEALTH QUESTIONNAIRE - PHQ9
2. FEELING DOWN, DEPRESSED OR HOPELESS: NOT AT ALL
SUM OF ALL RESPONSES TO PHQ9 QUESTIONS 1 AND 2: 0
1. LITTLE INTEREST OR PLEASURE IN DOING THINGS: NOT AT ALL

## 2024-07-08 ASSESSMENT — LIFESTYLE VARIABLES
AUDIT-C TOTAL SCORE: 0
AUDIT TOTAL SCORE: 0
HAVE YOU OR SOMEONE ELSE BEEN INJURED AS A RESULT OF YOUR DRINKING: NO
HAS A RELATIVE, FRIEND, DOCTOR, OR ANOTHER HEALTH PROFESSIONAL EXPRESSED CONCERN ABOUT YOUR DRINKING OR SUGGESTED YOU CUT DOWN: NO
HOW OFTEN DO YOU HAVE SIX OR MORE DRINKS ON ONE OCCASION: NEVER
HOW OFTEN DO YOU HAVE A DRINK CONTAINING ALCOHOL: NEVER
HOW MANY STANDARD DRINKS CONTAINING ALCOHOL DO YOU HAVE ON A TYPICAL DAY: PATIENT DOES NOT DRINK
SKIP TO QUESTIONS 9-10: 1

## 2024-07-08 ASSESSMENT — PAIN SCALES - GENERAL: PAINLEVEL: 0-NO PAIN

## 2024-07-08 NOTE — PROGRESS NOTES
History of present illness:  Patient follow-up history of heart failure systolic dysfunction ejection fraction of 20% status post ICD for primary and secondary prevention on mexiletine follows up with Dr. Tripathi. History of ischemic cardiomyopathy. Last 2 D echo this year showed ejection fraction of 20%. Patient underwent percutaneous coronary intervention to chronically occluded LAD In June 2022.  Patient is NYHA class II-III.  Denies having chest pain.  She does report shortness of breath with mild to moderate activity.  No lower extremity edema PND dizziness or device shocks.     Past Medical History:   Diagnosis Date    Abdominal aortic aneurysm (AAA) without rupture (CMS-HCC) 11/12/2023    Atherosclerotic heart disease of native coronary artery with other forms of angina pectoris (CMS-HCC) 11/12/2023    Benign essential hypertension 11/12/2023    CAD S/P percutaneous coronary angioplasty 11/12/2023    Cardiac defibrillator in place 11/12/2023    Chronic systolic heart failure (Multi) 11/12/2023    Intermittent claudication (CMS-HCC) 11/12/2023    Shortness of breath 11/12/2023    Sustained ventricular tachycardia (Multi) 11/12/2023    Ventricular fibrillation (Multi) 11/12/2023       Past Surgical History:   Procedure Laterality Date    CARDIAC DEFIBRILLATOR PLACEMENT      CHOLECYSTECTOMY      CORONARY ANGIOPLASTY WITH STENT PLACEMENT      HYSTERECTOMY      PACEMAKER PLACEMENT         Allergies   Allergen Reactions    Lisinopril Cough    Other Swelling     PCN  TONGUE SWELLING    Penicillins Unknown    Statins-Hmg-Coa Reductase Inhibitors Myalgia    Warfarin Unknown     Blood Blisters    Clopidogrel Rash     BLOOD BLISTERS        reports that she has been smoking cigarettes. She started smoking about 60 years ago. She has a 30.3 pack-year smoking history. She has never been exposed to tobacco smoke. She has never used smokeless tobacco. She reports that she does not currently use alcohol. She reports that she  does not use drugs.    Family History   Problem Relation Name Age of Onset    Cancer Mother  61    Heart attack Father  67       Patient's Medications   New Prescriptions    No medications on file   Previous Medications    ALIROCUMAB (PRALUENT PEN) 150 MG/ML PEN INJECTOR    Inject 1 mL (150 mg) under the skin 1 (one) time per week.    AMIODARONE (PACERONE) 200 MG TABLET    Take by mouth.    ASPIRIN 81 MG EC TABLET    Take 1 tablet (81 mg) by mouth once daily.    CHOLECALCIFEROL, VITAMIN D3, (VITAMIN D3 ORAL)    Take 1,000 Units by mouth once daily.    COENZYME Q-10 100 MG CAPSULE    Take by mouth.    ENTRESTO 24-26 MG TABLET    Take 1 tablet by mouth 2 times a day.    FARXIGA 10 MG    Take 1 tablet (10 mg) by mouth once daily.    HYDRALAZINE (APRESOLINE) 10 MG TABLET    Take 2.5 tablets (25 mg) by mouth 2 times a day.    ISOSORBIDE MONONITRATE ER (IMDUR) 30 MG 24 HR TABLET    Take 1 tablet (30 mg) by mouth once daily.    MECLIZINE (ANTIVERT) 25 MG TABLET    Take 1 tablet (25 mg) by mouth. As directed    METOPROLOL SUCCINATE XL (TOPROL-XL) 100 MG 24 HR TABLET    Take 0.5 tablets (50 mg) by mouth once daily.    MEXILETINE (MEXITIL) 150 MG CAPSULE    Take 1 capsule (150 mg) by mouth every 8 hours.    PRAVASTATIN (PRAVACHOL) 20 MG TABLET    Take 1 tablet by mouth once daily    PRAVASTATIN (PRAVACHOL) 80 MG TABLET    Take 1 tablet (80 mg) by mouth once daily.    SPIRONOLACTONE (ALDACTONE) 25 MG TABLET    Take 0.5 tablets (12.5 mg) by mouth once daily.   Modified Medications    No medications on file   Discontinued Medications    CLOPIDOGREL (PLAVIX) 75 MG TABLET    Take 1 tablet (75 mg) by mouth once daily.    ROSUVASTATIN (CRESTOR) 20 MG TABLET    Take by mouth once every 24 hours.       Objective   Physical Exam  General: Patient in no acute distress   HEENT: Atraumatic normocephalic.  Neck: Supple, jugular venous pressure within normal limit.  No bruits  Lungs: Clear to auscultation bilaterally  Cardiovascular:  Regular rate and rhythm, normal heart sounds, no murmurs rubs or gallops  Abdomen: Soft nontender nondistended.  Normal bowel sounds.  Extremities: Warm to touch, no edema.        Lab Review   No visits with results within 2 Month(s) from this visit.   Latest known visit with results is:   Lab on 03/22/2024   Component Date Value    Cholesterol 03/22/2024 216 (H)     HDL-Cholesterol 03/22/2024 61.2     Cholesterol/HDL Ratio 03/22/2024 3.5     LDL Calculated 03/22/2024 136 (H)     VLDL 03/22/2024 19     Triglycerides 03/22/2024 94     Non HDL Cholesterol 03/22/2024 155 (H)     Glucose 03/22/2024 92     Sodium 03/22/2024 140     Potassium 03/22/2024 4.8     Chloride 03/22/2024 104     Bicarbonate 03/22/2024 27     Anion Gap 03/22/2024 14     Urea Nitrogen 03/22/2024 18     Creatinine 03/22/2024 0.80     eGFR 03/22/2024 76     Calcium 03/22/2024 9.5         Assessment/Plan   Patient Active Problem List   Diagnosis    Abdominal aortic aneurysm (AAA) (CMS-HCC)    Benign essential hypertension    CAD S/P percutaneous coronary angioplasty    Cardiac defibrillator in place    Cardiomyopathy (Multi)    Chronic systolic heart failure (Multi)    Atherosclerotic heart disease of native coronary artery with other forms of angina pectoris (CMS-HCC)    Coronary artery disease involving native coronary artery of native heart without angina pectoris    Current smoker    Gastroesophageal reflux disease    Hyperglycemia    Hypertension    Ankle injury, left, initial encounter    Fracture of foot, left, closed, initial encounter    Fracture of foot    Intermittent claudication (CMS-[facility])    Ischemic myocardial dysfunction    Hypercholesterolemia    Arthritis    Rotator cuff tendinitis    Sciatica    Shortness of breath    Ventricular tachycardia (Multi)    Tick bite    Vasovagal syncope    Ventricular fibrillation (Multi)    Vertigo    Influenza    History of sustained ventricular tachycardia    Non-ST elevation (NSTEMI) myocardial  infarction (Multi)      Patient follow-up history of heart failure systolic dysfunction ejection fraction of 20% status post ICD for primary and secondary prevention on mexiletine follows up with Dr. Tripathi. History of ischemic cardiomyopathy. Last 2 D echo this year showed ejection fraction of 20%. Patient underwent percutaneous coronary intervention to chronically occluded LAD In June 2022.  Patient is NYHA class II-III.  Denies having chest pain.  She does report shortness of breath with mild to moderate activity.  No lower extremity edema PND dizziness or device shocks.  She is on mexiletine for history of V. tach.  At this point my recommendation is to continue current medications.  Patient now on higher dose pravastatin 80 mg oral daily she is intolerant to atorvastatin or rosuvastatin.  Will check lipid panel in 6 months last lipid panel was not under control and we increased her pravastatin from 20-80.  She has good tolerance to diet no muscle aches.  Will follow-up in 4 months we will repeat another lipid panel when I see her next time    Amos Moscoso MD

## 2024-08-19 ENCOUNTER — HOSPITAL ENCOUNTER (OUTPATIENT)
Dept: CARDIOLOGY | Facility: CLINIC | Age: 79
Discharge: HOME | End: 2024-08-19
Payer: MEDICARE

## 2024-08-19 DIAGNOSIS — Z95.810 CARDIAC DEFIBRILLATOR IN PLACE: ICD-10-CM

## 2024-08-19 PROCEDURE — 93296 REM INTERROG EVL PM/IDS: CPT

## 2024-11-05 ENCOUNTER — APPOINTMENT (OUTPATIENT)
Dept: PRIMARY CARE | Facility: CLINIC | Age: 79
End: 2024-11-05
Payer: MEDICARE

## 2024-11-05 VITALS
WEIGHT: 131 LBS | DIASTOLIC BLOOD PRESSURE: 62 MMHG | BODY MASS INDEX: 18.75 KG/M2 | SYSTOLIC BLOOD PRESSURE: 104 MMHG | HEIGHT: 70 IN

## 2024-11-05 DIAGNOSIS — M54.9 BACK PAIN, UNSPECIFIED BACK LOCATION, UNSPECIFIED BACK PAIN LATERALITY, UNSPECIFIED CHRONICITY: ICD-10-CM

## 2024-11-05 DIAGNOSIS — I10 PRIMARY HYPERTENSION: ICD-10-CM

## 2024-11-05 DIAGNOSIS — Z13.6 ENCOUNTER FOR ABDOMINAL AORTIC ANEURYSM (AAA) SCREENING: ICD-10-CM

## 2024-11-05 DIAGNOSIS — I73.9 CLAUDICATION (CMS-HCC): ICD-10-CM

## 2024-11-05 DIAGNOSIS — I25.83 CORONARY ARTERY DISEASE DUE TO LIPID RICH PLAQUE: ICD-10-CM

## 2024-11-05 DIAGNOSIS — I25.10 CORONARY ARTERY DISEASE DUE TO LIPID RICH PLAQUE: ICD-10-CM

## 2024-11-05 DIAGNOSIS — I50.9 CONGESTIVE HEART FAILURE, UNSPECIFIED HF CHRONICITY, UNSPECIFIED HEART FAILURE TYPE: ICD-10-CM

## 2024-11-05 PROCEDURE — 3078F DIAST BP <80 MM HG: CPT | Performed by: INTERNAL MEDICINE

## 2024-11-05 PROCEDURE — 99204 OFFICE O/P NEW MOD 45 MIN: CPT | Performed by: INTERNAL MEDICINE

## 2024-11-05 PROCEDURE — 3074F SYST BP LT 130 MM HG: CPT | Performed by: INTERNAL MEDICINE

## 2024-11-05 NOTE — PROGRESS NOTES
"Subjective   Patient ID: Nevaeh Pfeiffer is a 79 y.o. female who presents for New Patient Visit.    HPI   New patient visit  Follow-up on hypertension heart disease  Due for blood work  Complaining of having leg pains  Also complaining of having back pain    Past medical history: MI, pacemaker/defibrillator, ejection fraction 20%, AAA 4.6 x 3.8,  Occupation: Retired volunteer at school  Social history: Smokes denies drinking denies drugs  Review of Systems    Objective   /62   Ht 1.778 m (5' 10\")   Wt 59.4 kg (131 lb)   LMP  (LMP Unknown)   BMI 18.80 kg/m²     Physical Exam  Vitals reviewed.   Constitutional:       Appearance: Normal appearance.   HENT:      Head: Normocephalic and atraumatic.      Right Ear: Tympanic membrane, ear canal and external ear normal.      Left Ear: Tympanic membrane, ear canal and external ear normal.      Nose: Nose normal.      Mouth/Throat:      Pharynx: Oropharynx is clear.   Eyes:      Extraocular Movements: Extraocular movements intact.      Conjunctiva/sclera: Conjunctivae normal.      Pupils: Pupils are equal, round, and reactive to light.   Cardiovascular:      Rate and Rhythm: Normal rate and regular rhythm.      Pulses: Normal pulses.      Heart sounds: Normal heart sounds.   Pulmonary:      Effort: Pulmonary effort is normal.      Breath sounds: Normal breath sounds.   Abdominal:      General: Abdomen is flat. Bowel sounds are normal.      Palpations: Abdomen is soft.   Musculoskeletal:      Cervical back: Normal range of motion and neck supple.   Skin:     General: Skin is warm and dry.   Neurological:      General: No focal deficit present.      Mental Status: She is alert and oriented to person, place, and time.   Psychiatric:         Mood and Affect: Mood normal.         Assessment/Plan   Problem List Items Addressed This Visit             ICD-10-CM       Cardiac and Vasculature    Hypertension I10    Relevant Orders    CBC (Completed)    Comprehensive Metabolic " Panel (Completed)    Lipid Panel (Completed)    Magnesium (Completed)    B-type natriuretic peptide (Completed)    Creatine Kinase (Completed)     Other Visit Diagnoses         Codes    Coronary artery disease due to lipid rich plaque     I25.10, I25.83    Relevant Orders    CBC (Completed)    Comprehensive Metabolic Panel (Completed)    Lipid Panel (Completed)    Magnesium (Completed)    B-type natriuretic peptide (Completed)    Creatine Kinase (Completed)    Congestive heart failure, unspecified HF chronicity, unspecified heart failure type     I50.9    Relevant Orders    CBC (Completed)    Comprehensive Metabolic Panel (Completed)    Lipid Panel (Completed)    Magnesium (Completed)    B-type natriuretic peptide (Completed)    Creatine Kinase (Completed)    Encounter for abdominal aortic aneurysm (AAA) screening     Z13.6    Relevant Orders    Vascular US abdominal aorta anuerysm AAA screening    Claudication (CMS-HCC)     I73.9    Relevant Orders    Vascular US PVR with exercise    Back pain, unspecified back location, unspecified back pain laterality, unspecified chronicity     M54.9    Relevant Orders    Urinalysis with Reflex Culture and Microscopic (Completed)        Old chart reviewed  CBC CMP fasting with magnesium BNP CPK ordered  Will check UA CNS for the back pain  PVR studies for the leg pain  Ultrasound abdomen for AAA  Encouraged to quit smoking  Follow-up after the test

## 2024-11-06 ENCOUNTER — LAB (OUTPATIENT)
Dept: LAB | Facility: LAB | Age: 79
End: 2024-11-06
Payer: MEDICARE

## 2024-11-06 ENCOUNTER — CLINICAL SUPPORT (OUTPATIENT)
Dept: PRIMARY CARE | Facility: CLINIC | Age: 79
End: 2024-11-06
Payer: MEDICARE

## 2024-11-06 DIAGNOSIS — I10 PRIMARY HYPERTENSION: ICD-10-CM

## 2024-11-06 DIAGNOSIS — Z23 NEED FOR INFLUENZA VACCINATION: ICD-10-CM

## 2024-11-06 DIAGNOSIS — I25.83 CORONARY ARTERY DISEASE DUE TO LIPID RICH PLAQUE: ICD-10-CM

## 2024-11-06 DIAGNOSIS — I25.10 CORONARY ARTERY DISEASE DUE TO LIPID RICH PLAQUE: ICD-10-CM

## 2024-11-06 DIAGNOSIS — I50.9 CONGESTIVE HEART FAILURE, UNSPECIFIED HF CHRONICITY, UNSPECIFIED HEART FAILURE TYPE: ICD-10-CM

## 2024-11-06 DIAGNOSIS — M54.9 BACK PAIN, UNSPECIFIED BACK LOCATION, UNSPECIFIED BACK PAIN LATERALITY, UNSPECIFIED CHRONICITY: ICD-10-CM

## 2024-11-06 LAB
ALBUMIN SERPL BCP-MCNC: 4 G/DL (ref 3.4–5)
ALP SERPL-CCNC: 92 U/L (ref 33–136)
ALT SERPL W P-5'-P-CCNC: 9 U/L (ref 7–45)
ANION GAP SERPL CALC-SCNC: 12 MMOL/L (ref 10–20)
APPEARANCE UR: CLEAR
AST SERPL W P-5'-P-CCNC: 11 U/L (ref 9–39)
BACTERIA #/AREA URNS AUTO: ABNORMAL /HPF
BILIRUB SERPL-MCNC: 0.8 MG/DL (ref 0–1.2)
BILIRUB UR STRIP.AUTO-MCNC: NEGATIVE MG/DL
BNP SERPL-MCNC: 626 PG/ML (ref 0–99)
BUN SERPL-MCNC: 19 MG/DL (ref 6–23)
CALCIUM SERPL-MCNC: 9.5 MG/DL (ref 8.6–10.6)
CHLORIDE SERPL-SCNC: 105 MMOL/L (ref 98–107)
CHOLEST SERPL-MCNC: 190 MG/DL (ref 0–199)
CHOLESTEROL/HDL RATIO: 3.5
CK SERPL-CCNC: 27 U/L (ref 0–215)
CO2 SERPL-SCNC: 30 MMOL/L (ref 21–32)
COLOR UR: YELLOW
CREAT SERPL-MCNC: 0.73 MG/DL (ref 0.5–1.05)
EGFRCR SERPLBLD CKD-EPI 2021: 84 ML/MIN/1.73M*2
ERYTHROCYTE [DISTWIDTH] IN BLOOD BY AUTOMATED COUNT: 14.1 % (ref 11.5–14.5)
GLUCOSE SERPL-MCNC: 100 MG/DL (ref 74–99)
GLUCOSE UR STRIP.AUTO-MCNC: NORMAL MG/DL
HCT VFR BLD AUTO: 49.6 % (ref 36–46)
HDLC SERPL-MCNC: 54.2 MG/DL
HGB BLD-MCNC: 16.2 G/DL (ref 12–16)
HOLD SPECIMEN: NORMAL
HYALINE CASTS #/AREA URNS AUTO: ABNORMAL /LPF
KETONES UR STRIP.AUTO-MCNC: NEGATIVE MG/DL
LDLC SERPL CALC-MCNC: 117 MG/DL
LEUKOCYTE ESTERASE UR QL STRIP.AUTO: ABNORMAL
MAGNESIUM SERPL-MCNC: 2.35 MG/DL (ref 1.6–2.4)
MCH RBC QN AUTO: 30.2 PG (ref 26–34)
MCHC RBC AUTO-ENTMCNC: 32.7 G/DL (ref 32–36)
MCV RBC AUTO: 93 FL (ref 80–100)
MUCOUS THREADS #/AREA URNS AUTO: ABNORMAL /LPF
NITRITE UR QL STRIP.AUTO: NEGATIVE
NON HDL CHOLESTEROL: 136 MG/DL (ref 0–149)
NRBC BLD-RTO: 0 /100 WBCS (ref 0–0)
PH UR STRIP.AUTO: 5.5 [PH]
PLATELET # BLD AUTO: 382 X10*3/UL (ref 150–450)
POTASSIUM SERPL-SCNC: 5 MMOL/L (ref 3.5–5.3)
PROT SERPL-MCNC: 6.6 G/DL (ref 6.4–8.2)
PROT UR STRIP.AUTO-MCNC: NEGATIVE MG/DL
RBC # BLD AUTO: 5.36 X10*6/UL (ref 4–5.2)
RBC # UR STRIP.AUTO: NEGATIVE /UL
RBC #/AREA URNS AUTO: ABNORMAL /HPF
SODIUM SERPL-SCNC: 142 MMOL/L (ref 136–145)
SP GR UR STRIP.AUTO: 1.01
SQUAMOUS #/AREA URNS AUTO: ABNORMAL /HPF
TRIGL SERPL-MCNC: 95 MG/DL (ref 0–149)
UROBILINOGEN UR STRIP.AUTO-MCNC: NORMAL MG/DL
VLDL: 19 MG/DL (ref 0–40)
WBC # BLD AUTO: 9 X10*3/UL (ref 4.4–11.3)
WBC #/AREA URNS AUTO: ABNORMAL /HPF

## 2024-11-06 PROCEDURE — 83880 ASSAY OF NATRIURETIC PEPTIDE: CPT

## 2024-11-06 PROCEDURE — 90662 IIV NO PRSV INCREASED AG IM: CPT | Performed by: INTERNAL MEDICINE

## 2024-11-06 PROCEDURE — 36415 COLL VENOUS BLD VENIPUNCTURE: CPT

## 2024-11-06 PROCEDURE — 85027 COMPLETE CBC AUTOMATED: CPT

## 2024-11-06 PROCEDURE — 81001 URINALYSIS AUTO W/SCOPE: CPT

## 2024-11-06 PROCEDURE — 87086 URINE CULTURE/COLONY COUNT: CPT

## 2024-11-06 PROCEDURE — 82550 ASSAY OF CK (CPK): CPT

## 2024-11-06 PROCEDURE — 80061 LIPID PANEL: CPT

## 2024-11-06 PROCEDURE — 83735 ASSAY OF MAGNESIUM: CPT

## 2024-11-06 PROCEDURE — G0008 ADMIN INFLUENZA VIRUS VAC: HCPCS | Performed by: INTERNAL MEDICINE

## 2024-11-06 PROCEDURE — 80053 COMPREHEN METABOLIC PANEL: CPT

## 2024-11-08 LAB — BACTERIA UR CULT: ABNORMAL

## 2024-11-12 ENCOUNTER — APPOINTMENT (OUTPATIENT)
Dept: CARDIOLOGY | Facility: CLINIC | Age: 79
End: 2024-11-12
Payer: MEDICARE

## 2024-11-18 ENCOUNTER — HOSPITAL ENCOUNTER (OUTPATIENT)
Dept: CARDIOLOGY | Facility: CLINIC | Age: 79
Discharge: HOME | End: 2024-11-18
Payer: MEDICARE

## 2024-11-18 DIAGNOSIS — Z95.810 CARDIAC DEFIBRILLATOR IN PLACE: ICD-10-CM

## 2024-11-18 PROCEDURE — 93296 REM INTERROG EVL PM/IDS: CPT

## 2024-12-11 ENCOUNTER — HOSPITAL ENCOUNTER (OUTPATIENT)
Dept: CARDIOLOGY | Facility: CLINIC | Age: 79
Discharge: HOME | End: 2024-12-11
Payer: MEDICARE

## 2024-12-11 ENCOUNTER — APPOINTMENT (OUTPATIENT)
Dept: CARDIOLOGY | Facility: CLINIC | Age: 79
End: 2024-12-11
Payer: MEDICARE

## 2024-12-11 DIAGNOSIS — I47.20 VENTRICULAR TACHYCARDIA (MULTI): ICD-10-CM

## 2024-12-11 DIAGNOSIS — Z95.810 CARDIAC DEFIBRILLATOR IN PLACE: Primary | ICD-10-CM

## 2024-12-11 DIAGNOSIS — Z95.810 CARDIAC DEFIBRILLATOR IN PLACE: ICD-10-CM

## 2024-12-11 DIAGNOSIS — I42.9 CARDIOMYOPATHY, UNSPECIFIED TYPE (MULTI): ICD-10-CM

## 2024-12-11 PROCEDURE — 93283 PRGRMG EVAL IMPLANTABLE DFB: CPT

## 2024-12-16 DIAGNOSIS — I47.20 VENTRICULAR TACHYCARDIA (MULTI): ICD-10-CM

## 2024-12-16 NOTE — TELEPHONE ENCOUNTER
Please send the attached prescription to the patient's pharmacy as soon as possible. Thank you!    Requested Prescriptions     Pending Prescriptions Disp Refills    mexiletine (Mexitil) 150 mg capsule 270 capsule 3     Sig: Take 1 capsule (150 mg) by mouth every 8 hours.

## 2024-12-17 DIAGNOSIS — Z86.79 HISTORY OF ABDOMINAL AORTIC ANEURYSM (AAA): ICD-10-CM

## 2024-12-17 DIAGNOSIS — I10 ESSENTIAL (PRIMARY) HYPERTENSION: ICD-10-CM

## 2024-12-17 RX ORDER — MEXILETINE HYDROCHLORIDE 150 MG/1
150 CAPSULE ORAL EVERY 8 HOURS
Qty: 270 CAPSULE | Refills: 3 | Status: SHIPPED | OUTPATIENT
Start: 2024-12-17 | End: 2025-12-17

## 2024-12-19 ENCOUNTER — APPOINTMENT (OUTPATIENT)
Dept: RADIOLOGY | Facility: HOSPITAL | Age: 79
DRG: 640 | End: 2024-12-19
Payer: MEDICARE

## 2024-12-19 ENCOUNTER — HOSPITAL ENCOUNTER (INPATIENT)
Facility: HOSPITAL | Age: 79
LOS: 2 days | Discharge: HOME | DRG: 640 | End: 2024-12-21
Attending: EMERGENCY MEDICINE | Admitting: INTERNAL MEDICINE
Payer: MEDICARE

## 2024-12-19 ENCOUNTER — APPOINTMENT (OUTPATIENT)
Dept: CARDIOLOGY | Facility: HOSPITAL | Age: 79
DRG: 640 | End: 2024-12-19
Payer: MEDICARE

## 2024-12-19 DIAGNOSIS — R55 SYNCOPE, UNSPECIFIED SYNCOPE TYPE: ICD-10-CM

## 2024-12-19 DIAGNOSIS — M25.552 LEFT HIP PAIN: ICD-10-CM

## 2024-12-19 DIAGNOSIS — I66.3 OCCLUSION AND STENOSIS OF CEREBELLAR ARTERIES: ICD-10-CM

## 2024-12-19 DIAGNOSIS — G90.01 CAROTID SINUS SYNCOPE: ICD-10-CM

## 2024-12-19 DIAGNOSIS — R11.2 NAUSEA AND VOMITING, UNSPECIFIED VOMITING TYPE: ICD-10-CM

## 2024-12-19 DIAGNOSIS — R07.9 CHEST PAIN, UNSPECIFIED TYPE: ICD-10-CM
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
## 2024-12-19 LAB
ALBUMIN SERPL BCP-MCNC: 4.3 G/DL (ref 3.4–5)
ALP SERPL-CCNC: 91 U/L (ref 33–136)
ALT SERPL W P-5'-P-CCNC: 40 U/L (ref 7–45)
ANION GAP SERPL CALCULATED.3IONS-SCNC: 15 MMOL/L (ref 10–20)
AST SERPL W P-5'-P-CCNC: 34 U/L (ref 9–39)
BASOPHILS # BLD AUTO: 0.09 X10*3/UL (ref 0–0.1)
BASOPHILS NFR BLD AUTO: 1 %
BILIRUB SERPL-MCNC: 0.8 MG/DL (ref 0–1.2)
BUN SERPL-MCNC: 18 MG/DL (ref 6–23)
CALCIUM SERPL-MCNC: 9.9 MG/DL (ref 8.6–10.3)
CARDIAC TROPONIN I PNL SERPL HS: 9 NG/L (ref 0–13)
CARDIAC TROPONIN I PNL SERPL HS: 9 NG/L (ref 0–13)
CHLORIDE SERPL-SCNC: 102 MMOL/L (ref 98–107)
CO2 SERPL-SCNC: 24 MMOL/L (ref 21–32)
CREAT SERPL-MCNC: 0.83 MG/DL (ref 0.5–1.05)
D DIMER PPP FEU-MCNC: 3.84 MG/L FEU (ref 0.19–0.5)
EGFRCR SERPLBLD CKD-EPI 2021: 72 ML/MIN/1.73M*2
EOSINOPHIL # BLD AUTO: 0.44 X10*3/UL (ref 0–0.4)
EOSINOPHIL NFR BLD AUTO: 4.7 %
ERYTHROCYTE [DISTWIDTH] IN BLOOD BY AUTOMATED COUNT: 13.8 % (ref 11.5–14.5)
FLUAV RNA RESP QL NAA+PROBE: NOT DETECTED
FLUBV RNA RESP QL NAA+PROBE: NOT DETECTED
GLUCOSE SERPL-MCNC: 110 MG/DL (ref 74–99)
HCT VFR BLD AUTO: 54 % (ref 36–46)
HGB BLD-MCNC: 18.1 G/DL (ref 12–16)
IMM GRANULOCYTES # BLD AUTO: 0.02 X10*3/UL (ref 0–0.5)
IMM GRANULOCYTES NFR BLD AUTO: 0.2 % (ref 0–0.9)
LYMPHOCYTES # BLD AUTO: 1.79 X10*3/UL (ref 0.8–3)
LYMPHOCYTES NFR BLD AUTO: 19.1 %
MCH RBC QN AUTO: 30.5 PG (ref 26–34)
MCHC RBC AUTO-ENTMCNC: 33.5 G/DL (ref 32–36)
MCV RBC AUTO: 91 FL (ref 80–100)
MONOCYTES # BLD AUTO: 0.72 X10*3/UL (ref 0.05–0.8)
MONOCYTES NFR BLD AUTO: 7.7 %
NEUTROPHILS # BLD AUTO: 6.29 X10*3/UL (ref 1.6–5.5)
NEUTROPHILS NFR BLD AUTO: 67.3 %
NRBC BLD-RTO: 0 /100 WBCS (ref 0–0)
PLATELET # BLD AUTO: 363 X10*3/UL (ref 150–450)
POTASSIUM SERPL-SCNC: 3.8 MMOL/L (ref 3.5–5.3)
PROT SERPL-MCNC: 7.6 G/DL (ref 6.4–8.2)
RBC # BLD AUTO: 5.94 X10*6/UL (ref 4–5.2)
SARS-COV-2 RNA RESP QL NAA+PROBE: NOT DETECTED
SODIUM SERPL-SCNC: 137 MMOL/L (ref 136–145)
WBC # BLD AUTO: 9.4 X10*3/UL (ref 4.4–11.3)

## 2024-12-19 PROCEDURE — 99285 EMERGENCY DEPT VISIT HI MDM: CPT | Mod: 25 | Performed by: EMERGENCY MEDICINE

## 2024-12-19 PROCEDURE — 2060000001 HC INTERMEDIATE ICU ROOM DAILY

## 2024-12-19 PROCEDURE — 85300 ANTITHROMBIN III ACTIVITY: CPT | Performed by: EMERGENCY MEDICINE

## 2024-12-19 PROCEDURE — 84484 ASSAY OF TROPONIN QUANT: CPT | Performed by: EMERGENCY MEDICINE

## 2024-12-19 PROCEDURE — 2500000004 HC RX 250 GENERAL PHARMACY W/ HCPCS (ALT 636 FOR OP/ED)

## 2024-12-19 PROCEDURE — 70450 CT HEAD/BRAIN W/O DYE: CPT | Performed by: STUDENT IN AN ORGANIZED HEALTH CARE EDUCATION/TRAINING PROGRAM

## 2024-12-19 PROCEDURE — 87636 SARSCOV2 & INF A&B AMP PRB: CPT

## 2024-12-19 PROCEDURE — 73552 X-RAY EXAM OF FEMUR 2/>: CPT | Performed by: STUDENT IN AN ORGANIZED HEALTH CARE EDUCATION/TRAINING PROGRAM

## 2024-12-19 PROCEDURE — 93010 ELECTROCARDIOGRAM REPORT: CPT | Performed by: INTERNAL MEDICINE

## 2024-12-19 PROCEDURE — 70486 CT MAXILLOFACIAL W/O DYE: CPT | Performed by: STUDENT IN AN ORGANIZED HEALTH CARE EDUCATION/TRAINING PROGRAM

## 2024-12-19 PROCEDURE — 85025 COMPLETE CBC W/AUTO DIFF WBC: CPT | Performed by: EMERGENCY MEDICINE

## 2024-12-19 PROCEDURE — 2500000004 HC RX 250 GENERAL PHARMACY W/ HCPCS (ALT 636 FOR OP/ED): Performed by: INTERNAL MEDICINE

## 2024-12-19 PROCEDURE — 70450 CT HEAD/BRAIN W/O DYE: CPT

## 2024-12-19 PROCEDURE — 2550000001 HC RX 255 CONTRASTS: Performed by: EMERGENCY MEDICINE

## 2024-12-19 PROCEDURE — 73552 X-RAY EXAM OF FEMUR 2/>: CPT | Mod: LT

## 2024-12-19 PROCEDURE — 2500000001 HC RX 250 WO HCPCS SELF ADMINISTERED DRUGS (ALT 637 FOR MEDICARE OP): Performed by: INTERNAL MEDICINE

## 2024-12-19 PROCEDURE — 80053 COMPREHEN METABOLIC PANEL: CPT | Performed by: EMERGENCY MEDICINE

## 2024-12-19 PROCEDURE — 99222 1ST HOSP IP/OBS MODERATE 55: CPT | Performed by: INTERNAL MEDICINE

## 2024-12-19 PROCEDURE — 72125 CT NECK SPINE W/O DYE: CPT | Performed by: STUDENT IN AN ORGANIZED HEALTH CARE EDUCATION/TRAINING PROGRAM

## 2024-12-19 PROCEDURE — 2500000001 HC RX 250 WO HCPCS SELF ADMINISTERED DRUGS (ALT 637 FOR MEDICARE OP): Performed by: EMERGENCY MEDICINE

## 2024-12-19 PROCEDURE — 71275 CT ANGIOGRAPHY CHEST: CPT | Performed by: RADIOLOGY

## 2024-12-19 PROCEDURE — 96375 TX/PRO/DX INJ NEW DRUG ADDON: CPT

## 2024-12-19 PROCEDURE — 72125 CT NECK SPINE W/O DYE: CPT

## 2024-12-19 PROCEDURE — 36415 COLL VENOUS BLD VENIPUNCTURE: CPT | Performed by: EMERGENCY MEDICINE

## 2024-12-19 PROCEDURE — 76377 3D RENDER W/INTRP POSTPROCES: CPT | Mod: CCI

## 2024-12-19 PROCEDURE — 36415 COLL VENOUS BLD VENIPUNCTURE: CPT | Performed by: PHYSICIAN ASSISTANT

## 2024-12-19 PROCEDURE — 71275 CT ANGIOGRAPHY CHEST: CPT

## 2024-12-19 PROCEDURE — 2500000004 HC RX 250 GENERAL PHARMACY W/ HCPCS (ALT 636 FOR OP/ED): Performed by: EMERGENCY MEDICINE

## 2024-12-19 PROCEDURE — 72170 X-RAY EXAM OF PELVIS: CPT | Performed by: STUDENT IN AN ORGANIZED HEALTH CARE EDUCATION/TRAINING PROGRAM

## 2024-12-19 PROCEDURE — 96374 THER/PROPH/DIAG INJ IV PUSH: CPT

## 2024-12-19 PROCEDURE — 71045 X-RAY EXAM CHEST 1 VIEW: CPT

## 2024-12-19 PROCEDURE — 93005 ELECTROCARDIOGRAM TRACING: CPT

## 2024-12-19 PROCEDURE — 71045 X-RAY EXAM CHEST 1 VIEW: CPT | Performed by: STUDENT IN AN ORGANIZED HEALTH CARE EDUCATION/TRAINING PROGRAM

## 2024-12-19 PROCEDURE — 72170 X-RAY EXAM OF PELVIS: CPT

## 2024-12-19 PROCEDURE — 96376 TX/PRO/DX INJ SAME DRUG ADON: CPT

## 2024-12-19 PROCEDURE — 70486 CT MAXILLOFACIAL W/O DYE: CPT

## 2024-12-19 RX ORDER — ACETAMINOPHEN 160 MG/5ML
650 SOLUTION ORAL EVERY 4 HOURS PRN
Status: DISCONTINUED | OUTPATIENT
Start: 2024-12-19 | End: 2024-12-21 | Stop reason: HOSPADM

## 2024-12-19 RX ORDER — ACETAMINOPHEN 650 MG/1
650 SUPPOSITORY RECTAL EVERY 4 HOURS PRN
Status: DISCONTINUED | OUTPATIENT
Start: 2024-12-19 | End: 2024-12-21 | Stop reason: HOSPADM

## 2024-12-19 RX ORDER — ASPIRIN 81 MG/1
81 TABLET ORAL DAILY
Status: DISCONTINUED | OUTPATIENT
Start: 2024-12-20 | End: 2024-12-21 | Stop reason: HOSPADM

## 2024-12-19 RX ORDER — DOCUSATE SODIUM 100 MG/1
100 CAPSULE, LIQUID FILLED ORAL 2 TIMES DAILY
Status: DISCONTINUED | OUTPATIENT
Start: 2024-12-19 | End: 2024-12-21 | Stop reason: HOSPADM

## 2024-12-19 RX ORDER — ACETAMINOPHEN 325 MG/1
975 TABLET ORAL ONCE
Status: COMPLETED | OUTPATIENT
Start: 2024-12-19 | End: 2024-12-19

## 2024-12-19 RX ORDER — GUAIFENESIN/DEXTROMETHORPHAN 100-10MG/5
5 SYRUP ORAL EVERY 4 HOURS PRN
Status: DISCONTINUED | OUTPATIENT
Start: 2024-12-19 | End: 2024-12-21 | Stop reason: HOSPADM

## 2024-12-19 RX ORDER — METOCLOPRAMIDE HYDROCHLORIDE 5 MG/ML
10 INJECTION INTRAMUSCULAR; INTRAVENOUS ONCE
Status: COMPLETED | OUTPATIENT
Start: 2024-12-19 | End: 2024-12-19

## 2024-12-19 RX ORDER — ACETAMINOPHEN 325 MG/1
650 TABLET ORAL EVERY 4 HOURS PRN
Status: DISCONTINUED | OUTPATIENT
Start: 2024-12-19 | End: 2024-12-21 | Stop reason: HOSPADM

## 2024-12-19 RX ORDER — GUAIFENESIN 600 MG/1
600 TABLET, EXTENDED RELEASE ORAL EVERY 12 HOURS PRN
Status: DISCONTINUED | OUTPATIENT
Start: 2024-12-19 | End: 2024-12-21 | Stop reason: HOSPADM

## 2024-12-19 RX ORDER — DEXTROSE MONOHYDRATE, SODIUM CHLORIDE, AND POTASSIUM CHLORIDE 50; 1.49; 4.5 G/1000ML; G/1000ML; G/1000ML
100 INJECTION, SOLUTION INTRAVENOUS CONTINUOUS
Status: DISCONTINUED | OUTPATIENT
Start: 2024-12-19 | End: 2024-12-20

## 2024-12-19 RX ORDER — PRAVASTATIN SODIUM 20 MG/1
20 TABLET ORAL DAILY
Status: DISCONTINUED | OUTPATIENT
Start: 2024-12-20 | End: 2024-12-21 | Stop reason: HOSPADM

## 2024-12-19 RX ORDER — TALC
3 POWDER (GRAM) TOPICAL NIGHTLY
Status: DISCONTINUED | OUTPATIENT
Start: 2024-12-19 | End: 2024-12-21 | Stop reason: HOSPADM

## 2024-12-19 RX ORDER — SACUBITRIL AND VALSARTAN 24; 26 MG/1; MG/1
1 TABLET, FILM COATED ORAL 2 TIMES DAILY
Status: DISCONTINUED | OUTPATIENT
Start: 2024-12-19 | End: 2024-12-20

## 2024-12-19 RX ORDER — ONDANSETRON HYDROCHLORIDE 2 MG/ML
4 INJECTION, SOLUTION INTRAVENOUS ONCE
Status: COMPLETED | OUTPATIENT
Start: 2024-12-19 | End: 2024-12-19

## 2024-12-19 RX ORDER — POLYETHYLENE GLYCOL 3350 17 G/17G
17 POWDER, FOR SOLUTION ORAL DAILY
Status: DISCONTINUED | OUTPATIENT
Start: 2024-12-19 | End: 2024-12-21 | Stop reason: HOSPADM

## 2024-12-19 RX ORDER — MEXILETINE HYDROCHLORIDE 150 MG/1
150 CAPSULE ORAL EVERY 8 HOURS
Status: DISCONTINUED | OUTPATIENT
Start: 2024-12-19 | End: 2024-12-21 | Stop reason: HOSPADM

## 2024-12-19 RX ORDER — MORPHINE SULFATE 4 MG/ML
4 INJECTION, SOLUTION INTRAMUSCULAR; INTRAVENOUS ONCE
Status: COMPLETED | OUTPATIENT
Start: 2024-12-19 | End: 2024-12-19

## 2024-12-19 RX ORDER — FAMOTIDINE 10 MG/ML
20 INJECTION INTRAVENOUS ONCE
Status: COMPLETED | OUTPATIENT
Start: 2024-12-19 | End: 2024-12-19

## 2024-12-19 RX ORDER — METOPROLOL SUCCINATE 100 MG/1
100 TABLET, EXTENDED RELEASE ORAL DAILY
Status: DISCONTINUED | OUTPATIENT
Start: 2024-12-20 | End: 2024-12-20

## 2024-12-19 RX ORDER — DAPAGLIFLOZIN 10 MG/1
10 TABLET, FILM COATED ORAL DAILY
Status: DISCONTINUED | OUTPATIENT
Start: 2024-12-20 | End: 2024-12-21 | Stop reason: HOSPADM

## 2024-12-19 RX ORDER — ENOXAPARIN SODIUM 100 MG/ML
40 INJECTION SUBCUTANEOUS DAILY
Status: DISCONTINUED | OUTPATIENT
Start: 2024-12-19 | End: 2024-12-21 | Stop reason: HOSPADM

## 2024-12-19 SDOH — ECONOMIC STABILITY: TRANSPORTATION INSECURITY: IN THE PAST 12 MONTHS, HAS LACK OF TRANSPORTATION KEPT YOU FROM MEDICAL APPOINTMENTS OR FROM GETTING MEDICATIONS?: NO

## 2024-12-19 SDOH — ECONOMIC STABILITY: FOOD INSECURITY: HOW HARD IS IT FOR YOU TO PAY FOR THE VERY BASICS LIKE FOOD, HOUSING, MEDICAL CARE, AND HEATING?: NOT HARD AT ALL

## 2024-12-19 SDOH — SOCIAL STABILITY: SOCIAL INSECURITY: HAVE YOU HAD ANY THOUGHTS OF HARMING ANYONE ELSE?: NO

## 2024-12-19 SDOH — SOCIAL STABILITY: SOCIAL INSECURITY: ARE YOU MARRIED, WIDOWED, DIVORCED, SEPARATED, NEVER MARRIED, OR LIVING WITH A PARTNER?: MARRIED

## 2024-12-19 SDOH — ECONOMIC STABILITY: HOUSING INSECURITY: IN THE LAST 12 MONTHS, WAS THERE A TIME WHEN YOU WERE NOT ABLE TO PAY THE MORTGAGE OR RENT ON TIME?: NO

## 2024-12-19 SDOH — SOCIAL STABILITY: SOCIAL INSECURITY: WITHIN THE LAST YEAR, HAVE YOU BEEN AFRAID OF YOUR PARTNER OR EX-PARTNER?: NO

## 2024-12-19 SDOH — ECONOMIC STABILITY: HOUSING INSECURITY: IN THE PAST 12 MONTHS, HOW MANY TIMES HAVE YOU MOVED WHERE YOU WERE LIVING?: 0

## 2024-12-19 SDOH — SOCIAL STABILITY: SOCIAL NETWORK
DO YOU BELONG TO ANY CLUBS OR ORGANIZATIONS SUCH AS CHURCH GROUPS, UNIONS, FRATERNAL OR ATHLETIC GROUPS, OR SCHOOL GROUPS?: NO

## 2024-12-19 SDOH — HEALTH STABILITY: MENTAL HEALTH: HOW OFTEN DO YOU HAVE A DRINK CONTAINING ALCOHOL?: NEVER

## 2024-12-19 SDOH — SOCIAL STABILITY: SOCIAL INSECURITY
WITHIN THE LAST YEAR, HAVE YOU BEEN KICKED, HIT, SLAPPED, OR OTHERWISE PHYSICALLY HURT BY YOUR PARTNER OR EX-PARTNER?: NO

## 2024-12-19 SDOH — SOCIAL STABILITY: SOCIAL NETWORK: HOW OFTEN DO YOU GET TOGETHER WITH FRIENDS OR RELATIVES?: THREE TIMES A WEEK

## 2024-12-19 SDOH — SOCIAL STABILITY: SOCIAL INSECURITY
WITHIN THE LAST YEAR, HAVE YOU BEEN RAPED OR FORCED TO HAVE ANY KIND OF SEXUAL ACTIVITY BY YOUR PARTNER OR EX-PARTNER?: NO

## 2024-12-19 SDOH — SOCIAL STABILITY: SOCIAL INSECURITY: WITHIN THE LAST YEAR, HAVE YOU BEEN HUMILIATED OR EMOTIONALLY ABUSED IN OTHER WAYS BY YOUR PARTNER OR EX-PARTNER?: NO

## 2024-12-19 SDOH — SOCIAL STABILITY: SOCIAL INSECURITY: HAS ANYONE EVER THREATENED TO HURT YOUR FAMILY OR YOUR PETS?: NO

## 2024-12-19 SDOH — SOCIAL STABILITY: SOCIAL INSECURITY: HAVE YOU HAD THOUGHTS OF HARMING ANYONE ELSE?: NO

## 2024-12-19 SDOH — SOCIAL STABILITY: SOCIAL INSECURITY: DO YOU FEEL UNSAFE GOING BACK TO THE PLACE WHERE YOU ARE LIVING?: NO

## 2024-12-19 SDOH — ECONOMIC STABILITY: FOOD INSECURITY: WITHIN THE PAST 12 MONTHS, YOU WORRIED THAT YOUR FOOD WOULD RUN OUT BEFORE YOU GOT THE MONEY TO BUY MORE.: NEVER TRUE

## 2024-12-19 SDOH — SOCIAL STABILITY: SOCIAL INSECURITY: DO YOU FEEL ANYONE HAS EXPLOITED OR TAKEN ADVANTAGE OF YOU FINANCIALLY OR OF YOUR PERSONAL PROPERTY?: NO

## 2024-12-19 SDOH — SOCIAL STABILITY: SOCIAL NETWORK
IN A TYPICAL WEEK, HOW MANY TIMES DO YOU TALK ON THE PHONE WITH FAMILY, FRIENDS, OR NEIGHBORS?: MORE THAN THREE TIMES A WEEK

## 2024-12-19 SDOH — ECONOMIC STABILITY: INCOME INSECURITY: IN THE PAST 12 MONTHS HAS THE ELECTRIC, GAS, OIL, OR WATER COMPANY THREATENED TO SHUT OFF SERVICES IN YOUR HOME?: NO

## 2024-12-19 SDOH — SOCIAL STABILITY: SOCIAL INSECURITY: DOES ANYONE TRY TO KEEP YOU FROM HAVING/CONTACTING OTHER FRIENDS OR DOING THINGS OUTSIDE YOUR HOME?: NO

## 2024-12-19 SDOH — ECONOMIC STABILITY: FOOD INSECURITY: WITHIN THE PAST 12 MONTHS, THE FOOD YOU BOUGHT JUST DIDN'T LAST AND YOU DIDN'T HAVE MONEY TO GET MORE.: NEVER TRUE

## 2024-12-19 SDOH — HEALTH STABILITY: MENTAL HEALTH: HOW MANY DRINKS CONTAINING ALCOHOL DO YOU HAVE ON A TYPICAL DAY WHEN YOU ARE DRINKING?: PATIENT DOES NOT DRINK

## 2024-12-19 SDOH — ECONOMIC STABILITY: HOUSING INSECURITY: AT ANY TIME IN THE PAST 12 MONTHS, WERE YOU HOMELESS OR LIVING IN A SHELTER (INCLUDING NOW)?: NO

## 2024-12-19 SDOH — HEALTH STABILITY: MENTAL HEALTH: HOW OFTEN DO YOU HAVE SIX OR MORE DRINKS ON ONE OCCASION?: NEVER

## 2024-12-19 SDOH — HEALTH STABILITY: PHYSICAL HEALTH
HOW OFTEN DO YOU NEED TO HAVE SOMEONE HELP YOU WHEN YOU READ INSTRUCTIONS, PAMPHLETS, OR OTHER WRITTEN MATERIAL FROM YOUR DOCTOR OR PHARMACY?: NEVER

## 2024-12-19 SDOH — HEALTH STABILITY: MENTAL HEALTH
DO YOU FEEL STRESS - TENSE, RESTLESS, NERVOUS, OR ANXIOUS, OR UNABLE TO SLEEP AT NIGHT BECAUSE YOUR MIND IS TROUBLED ALL THE TIME - THESE DAYS?: NOT AT ALL

## 2024-12-19 SDOH — SOCIAL STABILITY: SOCIAL NETWORK: HOW OFTEN DO YOU ATTEND CHURCH OR RELIGIOUS SERVICES?: NEVER

## 2024-12-19 SDOH — SOCIAL STABILITY: SOCIAL INSECURITY: ARE THERE ANY APPARENT SIGNS OF INJURIES/BEHAVIORS THAT COULD BE RELATED TO ABUSE/NEGLECT?: NO

## 2024-12-19 SDOH — HEALTH STABILITY: PHYSICAL HEALTH: ON AVERAGE, HOW MANY MINUTES DO YOU ENGAGE IN EXERCISE AT THIS LEVEL?: 30 MIN

## 2024-12-19 SDOH — SOCIAL STABILITY: SOCIAL INSECURITY: ARE YOU OR HAVE YOU BEEN THREATENED OR ABUSED PHYSICALLY, EMOTIONALLY, OR SEXUALLY BY ANYONE?: NO

## 2024-12-19 SDOH — HEALTH STABILITY: PHYSICAL HEALTH: ON AVERAGE, HOW MANY DAYS PER WEEK DO YOU ENGAGE IN MODERATE TO STRENUOUS EXERCISE (LIKE A BRISK WALK)?: 3 DAYS

## 2024-12-19 SDOH — SOCIAL STABILITY: SOCIAL NETWORK: HOW OFTEN DO YOU ATTEND MEETINGS OF THE CLUBS OR ORGANIZATIONS YOU BELONG TO?: NEVER

## 2024-12-19 SDOH — SOCIAL STABILITY: SOCIAL INSECURITY: ABUSE: ADULT

## 2024-12-19 ASSESSMENT — ACTIVITIES OF DAILY LIVING (ADL)
BATHING: INDEPENDENT
LACK_OF_TRANSPORTATION: NO
DRESSING YOURSELF: INDEPENDENT
ADEQUATE_TO_COMPLETE_ADL: YES
LACK_OF_TRANSPORTATION: NO
HEARING - LEFT EAR: FUNCTIONAL
HEARING - RIGHT EAR: FUNCTIONAL
FEEDING YOURSELF: INDEPENDENT
GROOMING: INDEPENDENT
PATIENT'S MEMORY ADEQUATE TO SAFELY COMPLETE DAILY ACTIVITIES?: YES
WALKS IN HOME: INDEPENDENT
TOILETING: INDEPENDENT
JUDGMENT_ADEQUATE_SAFELY_COMPLETE_DAILY_ACTIVITIES: YES

## 2024-12-19 ASSESSMENT — COGNITIVE AND FUNCTIONAL STATUS - GENERAL
MOBILITY SCORE: 24
PATIENT BASELINE BEDBOUND: NO
DAILY ACTIVITIY SCORE: 24

## 2024-12-19 ASSESSMENT — COLUMBIA-SUICIDE SEVERITY RATING SCALE - C-SSRS
1. IN THE PAST MONTH, HAVE YOU WISHED YOU WERE DEAD OR WISHED YOU COULD GO TO SLEEP AND NOT WAKE UP?: NO
2. HAVE YOU ACTUALLY HAD ANY THOUGHTS OF KILLING YOURSELF?: NO
6. HAVE YOU EVER DONE ANYTHING, STARTED TO DO ANYTHING, OR PREPARED TO DO ANYTHING TO END YOUR LIFE?: NO

## 2024-12-19 ASSESSMENT — PAIN SCALES - GENERAL
PAINLEVEL_OUTOF10: 0 - NO PAIN

## 2024-12-19 ASSESSMENT — LIFESTYLE VARIABLES
AUDIT-C TOTAL SCORE: 0
HOW OFTEN DO YOU HAVE 6 OR MORE DRINKS ON ONE OCCASION: NEVER
SKIP TO QUESTIONS 9-10: 1
AUDIT-C TOTAL SCORE: 0
SUBSTANCE_ABUSE_PAST_12_MONTHS: NO
HOW OFTEN DO YOU HAVE A DRINK CONTAINING ALCOHOL: NEVER
PRESCIPTION_ABUSE_PAST_12_MONTHS: NO
HOW MANY STANDARD DRINKS CONTAINING ALCOHOL DO YOU HAVE ON A TYPICAL DAY: PATIENT DOES NOT DRINK
AUDIT-C TOTAL SCORE: 0
SKIP TO QUESTIONS 9-10: 1

## 2024-12-19 ASSESSMENT — PATIENT HEALTH QUESTIONNAIRE - PHQ9
SUM OF ALL RESPONSES TO PHQ9 QUESTIONS 1 & 2: 0
2. FEELING DOWN, DEPRESSED OR HOPELESS: NOT AT ALL
1. LITTLE INTEREST OR PLEASURE IN DOING THINGS: NOT AT ALL

## 2024-12-19 ASSESSMENT — PAIN - FUNCTIONAL ASSESSMENT
PAIN_FUNCTIONAL_ASSESSMENT: 0-10
PAIN_FUNCTIONAL_ASSESSMENT: 0-10

## 2024-12-19 ASSESSMENT — PAIN SCALES - WONG BAKER: WONGBAKER_NUMERICALRESPONSE: NO HURT

## 2024-12-19 NOTE — ED PROVIDER NOTES
HPI   Chief Complaint   Patient presents with    Syncope     Fall this am from possible syncope episode       Patient is a 79-year-old female with past medical history of cardiac defibrillator in place presenting from home with concerns for syncope.  Family at bedside.  The  who is with the patient states that this happened between 10:11 AM.  Reports that patient did not feel lightheaded or dizzy prior to the syncope.  Describes it not as a mechanical fall.  States that the patient did fall straight to the floor.  Report is that the patient has also been dealing with chest congestion and flulike symptoms for several days.  Patient states she has not had issues like this before.  Denies any chest pain.  Intermittent, subjective fevers and chills as well as nonproductive cough.  Patient denies sore throat, runny nose, chest pain, shortness of breath, abdominal pain, nausea, vomiting, diarrhea or urinary complaints.              Patient History   Past Medical History:   Diagnosis Date    Abdominal aortic aneurysm (AAA) without rupture (CMS-HCC) 11/12/2023    Atherosclerotic heart disease of native coronary artery with other forms of angina pectoris (CMS-HCC) 11/12/2023    Benign essential hypertension 11/12/2023    CAD S/P percutaneous coronary angioplasty 11/12/2023    Cardiac defibrillator in place 11/12/2023    Chronic systolic heart failure (Multi) 11/12/2023    Intermittent claudication (CMS-HCC) 11/12/2023    Shortness of breath 11/12/2023    Sustained ventricular tachycardia (Multi) 11/12/2023    Ventricular fibrillation (Multi) 11/12/2023     Past Surgical History:   Procedure Laterality Date    CARDIAC DEFIBRILLATOR PLACEMENT      CHOLECYSTECTOMY      CORONARY ANGIOPLASTY WITH STENT PLACEMENT      HYSTERECTOMY      PACEMAKER PLACEMENT       Family History   Problem Relation Name Age of Onset    Cancer Mother  61    Heart attack Father  67     Social History     Tobacco Use    Smoking status: Some Days      Current packs/day: 0.50     Average packs/day: 0.5 packs/day for 61.0 years (30.5 ttl pk-yrs)     Types: Cigarettes     Start date: 1964     Passive exposure: Never    Smokeless tobacco: Never    Tobacco comments:     Has one cigarette on occasion     03.06.24- Pt states she is ready to quit   Substance Use Topics    Alcohol use: Not Currently    Drug use: Never       Physical Exam   ED Triage Vitals   Temperature Heart Rate Respirations BP   12/19/24 1635 12/19/24 1635 12/19/24 1635 12/19/24 1639   36.7 °C (98.1 °F) 85 18 104/70      Pulse Ox Temp Source Heart Rate Source Patient Position   12/19/24 1635 12/19/24 1635 12/19/24 1635 12/19/24 1635   98 % Oral Monitor Sitting      BP Location FiO2 (%)     12/19/24 1635 --     Right arm        Physical Exam  Vitals and nursing note reviewed.   Constitutional:       Appearance: She is well-developed.      Comments: Awake, sitting in examination chair   HENT:      Head: Normocephalic and atraumatic.      Nose: Nose normal.      Mouth/Throat:      Mouth: Mucous membranes are moist.      Pharynx: Oropharynx is clear.   Eyes:      Extraocular Movements: Extraocular movements intact.      Conjunctiva/sclera: Conjunctivae normal.      Pupils: Pupils are equal, round, and reactive to light.   Cardiovascular:      Rate and Rhythm: Normal rate and regular rhythm.      Pulses: Normal pulses.      Heart sounds: Normal heart sounds. No murmur heard.  Pulmonary:      Effort: Pulmonary effort is normal. No respiratory distress.      Breath sounds: Normal breath sounds.   Abdominal:      General: Abdomen is flat.      Palpations: Abdomen is soft.      Tenderness: There is no abdominal tenderness.   Musculoskeletal:         General: No swelling. Normal range of motion.      Cervical back: Normal range of motion and neck supple.   Skin:     General: Skin is warm and dry.      Capillary Refill: Capillary refill takes less than 2 seconds.   Neurological:      General: No focal deficit  present.      Mental Status: She is alert and oriented to person, place, and time.   Psychiatric:         Mood and Affect: Mood normal.         Behavior: Behavior normal.           ED Course & MDM   Diagnoses as of 12/19/24 2035   Fall, initial encounter   Chest pain, unspecified type   Left hip pain   Nausea and vomiting, unspecified vomiting type                 No data recorded     [place] Coma Scale Score: 15 (12/19/24 1729 : Brisa Myers RN)                           Medical Decision Making  Patient is a 79-year-old female with past medical history of cardiac defibrillator in place presenting from home with concerns for STEMI.  Lab work, imaging ordered.  Urine ordered.  Interrogation of device has been requested.  Conditions considered include but are not limited to: Syncope, defibrillator firing, cardiac arrhythmia.    I saw this patient in conjunction with Dr. Fermin.  Patient's labs do appear hydrated his CBC is without leukocytosis but hemoglobin does show hemoconcentration with hemoglobin of 18.1.  CMP without significant electrolyte abnormality or renal impairment.  Initial and repeat troponin within normal limits.  Viral swabs are negative.  CT facial bones provide chronic neck CT C-spine without acute finding.  CT head without acute findings.  D-dimer was elevated so CT PE ordered.  CT PE without acute findings.    Admitting provider, Dr. Thurman, is already into see this patient and we did discuss admitting for observation to monitor ensure patient not having any other syncopal type episodes.  As I deemed necessary from the patient's history, physical, laboratory and imaging findings as well as ED course, I considered the above listed diagnoses.  Patient given different medications to help with her nausea during the emergency department stay.    Portions of this note made with Dragon software, please be mindful of potential grammatical errors.        Medications   sodium chloride 0.9 % bolus 500 mL  (500 mL intravenous New Bag 12/19/24 1948)   metoclopramide (Reglan) injection 10 mg (has no administration in time range)   acetaminophen (Tylenol) tablet 975 mg (975 mg oral Given 12/19/24 1737)   famotidine PF (Pepcid) injection 20 mg (20 mg intravenous Given 12/19/24 1734)   ondansetron (Zofran) injection 4 mg (4 mg intravenous Given 12/19/24 1734)   morphine injection 4 mg (4 mg intravenous Given 12/19/24 1734)   ondansetron (Zofran) injection 4 mg (4 mg intravenous Given 12/19/24 1948)   iohexol (OMNIPaque) 350 mg iodine/mL solution 75 mL (75 mL intravenous Given 12/19/24 1933)         Labs Reviewed   CBC WITH AUTO DIFFERENTIAL - Abnormal       Result Value    WBC 9.4      nRBC 0.0      RBC 5.94 (*)     Hemoglobin 18.1 (*)     Hematocrit 54.0 (*)     MCV 91      MCH 30.5      MCHC 33.5      RDW 13.8      Platelets 363      Neutrophils % 67.3      Immature Granulocytes %, Automated 0.2      Lymphocytes % 19.1      Monocytes % 7.7      Eosinophils % 4.7      Basophils % 1.0      Neutrophils Absolute 6.29 (*)     Immature Granulocytes Absolute, Automated 0.02      Lymphocytes Absolute 1.79      Monocytes Absolute 0.72      Eosinophils Absolute 0.44 (*)     Basophils Absolute 0.09     COMPREHENSIVE METABOLIC PANEL - Abnormal    Glucose 110 (*)     Sodium 137      Potassium 3.8      Chloride 102      Bicarbonate 24      Anion Gap 15      Urea Nitrogen 18      Creatinine 0.83      eGFR 72      Calcium 9.9      Albumin 4.3      Alkaline Phosphatase 91      Total Protein 7.6      AST 34      Bilirubin, Total 0.8      ALT 40     D-DIMER, NON VTE - Abnormal    D-Dimer Non VTE, Quant (mg/L FEU) 3.84 (*)     Narrative:     THROMBOEMBOLIC EVENTS CANNOT BE EXCLUDED SOLELY ON THE BASIS OF THE D-DIMER LEVEL BEING WITHIN THE NORMAL REFERENCE RANGE. D-DIMER LEVELS LESS THAN 0.5 MG/L FEU IN CONJUNCTION WITH A LOW CLINICAL PROBABILITY HAVE AN EXCELLENT NEGATIVE PREDICTIVE VALUE IN EXCLUDING A DIAGNOSIS OF PULMONARY EMBOLUS (PE)  OR DEEP VEIN THROMBOSIS (DVT). ELEVATED D-DIMER LEVELS ARE NOT SPECIFIC TO PE OR DVT, AND MAY BE SEEN IN PATIENTS WITH DIC, ADVANCED AGE, PREGNANCY, MALIGNANCY, LIVER DISEASE, INFECTION, AND INFLAMMATORY CONDITIONS AMONG OTHERS. D-DIMER LEVELS MAY BE DECREASED IN PATIENTS RECEIVING ANTI-COAGULATION THERAPY.   SERIAL TROPONIN-INITIAL - Normal    Troponin I, High Sensitivity 9      Narrative:     Less than 99th percentile of normal range cutoff-  Female and children under 18 years old <14 ng/L; Male <21 ng/L: Negative  Repeat testing should be performed if clinically indicated.     Female and children under 18 years old 14-50 ng/L; Male 21-50 ng/L:  Consistent with possible cardiac damage and possible increased clinical   risk. Serial measurements may help to assess extent of myocardial damage.     >50 ng/L: Consistent with cardiac damage, increased clinical risk and  myocardial infarction. Serial measurements may help assess extent of   myocardial damage.      NOTE: Children less than 1 year old may have higher baseline troponin   levels and results should be interpreted in conjunction with the overall   clinical context.     NOTE: Troponin I testing is performed using a different   testing methodology at [facility] than at other   [facility]. Direct result comparisons should only   be made within the same method.   SERIAL TROPONIN, 1 HOUR - Normal    Troponin I, High Sensitivity 9      Narrative:     Less than 99th percentile of normal range cutoff-  Female and children under 18 years old <14 ng/L; Male <21 ng/L: Negative  Repeat testing should be performed if clinically indicated.     Female and children under 18 years old 14-50 ng/L; Male 21-50 ng/L:  Consistent with possible cardiac damage and possible increased clinical   risk. Serial measurements may help to assess extent of myocardial damage.     >50 ng/L: Consistent with cardiac damage, increased clinical risk and  myocardial infarction.  Serial measurements may help assess extent of   myocardial damage.      NOTE: Children less than 1 year old may have higher baseline troponin   levels and results should be interpreted in conjunction with the overall   clinical context.     NOTE: Troponin I testing is performed using a different   testing methodology at [facility] than at [facility]. Direct result comparisons should only   be made within the same method.   SARS-COV-2 AND INFLUENZA A/B PCR - Normal    Flu A Result Not Detected      Flu B Result Not Detected      Coronavirus 2019, PCR Not Detected      Narrative:     This assay has received FDA Emergency Use Authorization (EUA) and  is only authorized for the duration of time that circumstances exist to justify the authorization of the emergency use of in vitro diagnostic tests for the detection of SARS-CoV-2 virus and/or diagnosis of COVID-19 infection under section 564(b)(1) of the Act, 21 U.S.C. 360bbb-3(b)(1). Testing for SARS-CoV-2 is only recommended for patients who meet current clinical and/or epidemiological criteria as defined by federal, state, or local public health directives. This assay is an in vitro diagnostic nucleic acid amplification test for the qualitative detection of SARS-CoV-2, Influenza A, and Influenza B from nasopharyngeal specimens and has been validated for use at [facility]. Negative results do not preclude COVID-19 infections or Influenza A/B infections, and should not be used as the sole basis for diagnosis, treatment, or other management decisions. If Influenza A/B and RSV PCR results are negative, testing for Parainfluenza virus, Adenovirus and Metapneumovirus is routinely performed for [facility] pediatric oncology and intensive care inpatients, and is available on other patients by placing an add-on request.    TROPONIN SERIES- (INITIAL, 1 HR)    Narrative:     The following orders were created for panel order Troponin  Series, (0, 1 HR).  Procedure                               Abnormality         Status                     ---------                               -----------         ------                     Troponin I, High Sensiti...[[id number]]  Normal              Final result               Troponin, High Sensitivi...[[id number]]  Normal              Final result                 Please view results for these tests on the individual orders.   URINALYSIS WITH REFLEX MICROSCOPIC         CT angio chest for pulmonary embolism   Final Result   No acute pulmonary embolus to the segmental level.        Redemonstrated emphysematous changes throughout the lungs with a   stable 5 mm pulmonary nodule in the right lower lobe and scarring in   the left lower lobe.             MACRO:   None.        Signed by: Evan Finkelstein 12/19/2024 8:22 PM   Dictation workstation:   [id number]      XR chest 1 view   Final Result   1.  No evidence of acute cardiopulmonary process.                  MACRO:   None        Signed by: Yefri Horan 12/19/2024 6:29 PM   Dictation workstation:   [id number]      XR femur left 2+ views   Final Result   No evidence of acute fracture or traumatic malalignment of the pelvis   or left femur.             MACRO:   None        Signed by: Yefri Horan 12/19/2024 6:36 PM   Dictation workstation:   [id number]      XR pelvis 1-2 views   Final Result   No evidence of acute fracture or traumatic malalignment of the pelvis   or left femur.             MACRO:   None        Signed by: Yefri Horan 12/19/2024 6:36 PM   Dictation workstation:   [id number]      CT head wo IV contrast   Final Result   CT HEAD:   1. No evidence of hemorrhage, skull fracture, or other acute   intracranial trauma/abnormality.   2. Small geographic area of cystic encephalomalacia and gliosis is   present in the superior right frontal lobe, likely representing   sequela of prior infarct/injury.   3. Patchy attenuation changes  present in the periventricular and   subcortical white matter of bilateral cerebral hemispheres likely   represent sequela of microvascular disease.        CT FACIAL BONES:   1. No evidence of acute facial trauma.   2. Completely opacification of the left maxillary sinus with mild   expansion of the left ostiomeatal unit. Correlate with symptoms of   sinusitis.        CT C-SPINE:   1. No evidence of acute trauma to the cervical spine.   2. Spondylotic changes of the cervical spine as detailed above.        MACRO:   None        Signed by: Yefri Santanaary 12/19/2024 6:28 PM   Dictation workstation:   [id number]      CT cervical spine wo IV contrast   Final Result   CT HEAD:   1. No evidence of hemorrhage, skull fracture, or other acute   intracranial trauma/abnormality.   2. Small geographic area of cystic encephalomalacia and gliosis is   present in the superior right frontal lobe, likely representing   sequela of prior infarct/injury.   3. Patchy attenuation changes present in the periventricular and   subcortical white matter of bilateral cerebral hemispheres likely   represent sequela of microvascular disease.        CT FACIAL BONES:   1. No evidence of acute facial trauma.   2. Completely opacification of the left maxillary sinus with mild   expansion of the left ostiomeatal unit. Correlate with symptoms of   sinusitis.        CT C-SPINE:   1. No evidence of acute trauma to the cervical spine.   2. Spondylotic changes of the cervical spine as detailed above.        MACRO:   None        Signed by: Yefri SantanaArnot Ogden Medical Centerdamaris 12/19/2024 6:28 PM   Dictation workstation:   [id number]      CT maxillofacial bones wo IV contrast   Final Result   CT HEAD:   1. No evidence of hemorrhage, skull fracture, or other acute   intracranial trauma/abnormality.   2. Small geographic area of cystic encephalomalacia and gliosis is   present in the superior right frontal lobe, likely representing   sequela of prior infarct/injury.    3. Patchy attenuation changes present in the periventricular and   subcortical white matter of bilateral cerebral hemispheres likely   represent sequela of microvascular disease.        CT FACIAL BONES:   1. No evidence of acute facial trauma.   2. Completely opacification of the left maxillary sinus with mild   expansion of the left ostiomeatal unit. Correlate with symptoms of   sinusitis.        CT C-SPINE:   1. No evidence of acute trauma to the cervical spine.   2. Spondylotic changes of the cervical spine as detailed above.        MACRO:   None        Signed by: Yefri Horan 12/19/2024 6:28 PM   Dictation workstation:   [id number]      CT 3D reconstruction   Final Result   CT HEAD:   1. No evidence of hemorrhage, skull fracture, or other acute   intracranial trauma/abnormality.   2. Small geographic area of cystic encephalomalacia and gliosis is   present in the superior right frontal lobe, likely representing   sequela of prior infarct/injury.   3. Patchy attenuation changes present in the periventricular and   subcortical white matter of bilateral cerebral hemispheres likely   represent sequela of microvascular disease.        CT FACIAL BONES:   1. No evidence of acute facial trauma.   2. Completely opacification of the left maxillary sinus with mild   expansion of the left ostiomeatal unit. Correlate with symptoms of   sinusitis.        CT C-SPINE:   1. No evidence of acute trauma to the cervical spine.   2. Spondylotic changes of the cervical spine as detailed above.        MACRO:   None        Signed by: Yefri Horan 12/19/2024 6:28 PM   Dictation workstation:   [id number]            Procedure  Procedures     Jonathan Gong PA-C  12/19/24 2036

## 2024-12-19 NOTE — PROGRESS NOTES
Attestation/Supervisory note for SOHEILA Gong      The patient is a 79-year-old female presenting to the emergency department for evaluation of left-sided chest pain, left-sided hip/upper leg pain, and a collapse at home around [id number].  The patient states that she does not know exactly what happened.  She states that she did not have any symptoms but then collapsed to the ground.  She states that she injured her left hip and has left-sided chest pain since she fell.  She states that she does not believe that she actually passed out but if she did it was only a very transient loss of consciousness.  She states that she just felt confused when she went to the ground as to why she ended up on the ground.  She states that she has had some cough and congestion symptoms over the past several days but no other symptoms.  She denies any headache or visual changes.  No neck or back pain.  The chest pain is left-sided.  No better or worse.  No radiation.  It is constant.  She denies any significant shortness of breath.  No palpitations.  No diaphoresis.  No abdominal pain.  No nausea or vomiting.  No diarrhea or constipation.  No urinary complaints.  She does smoke daily.  She states that she does have an AICD in place.  All pertinent positives and negatives are recorded above.  All other systems reviewed and otherwise negative.  Vital signs within normal limits.  Physical exam with a well-nourished well-developed female in no acute distress.  HEENT exam with dry mucous membranes but otherwise unremarkable.  She has no evidence of airway compromise or respiratory distress.  Abdominal exam is benign.  She does not have any gross motor, neurologic or vascular deficits on exam.  Pulses are equal bilaterally.  NIH stroke scale score of 0 at this time.  No visible or palpable bony deformities.      EKG with sinus rhythm at 85 bpm, occasional PACs, leftward axis, normal voltage, normal ST segment, normal T waves      Oral  acetaminophen, IV Pepcid, IV morphine and IV Zofran ordered.      Diagnostic labs without significant abnormality but the results of the urinalysis and D-dimer were pending at the time of my departure.      Initial troponin 9.  Repeat troponin 9      XR chest 1 view   Final Result   1.  No evidence of acute cardiopulmonary process.                  MACRO:   None        Signed by: Yefir Horan 12/19/2024 6:29 PM   Dictation workstation:   [id number]      XR femur left 2+ views   Final Result   No evidence of acute fracture or traumatic malalignment of the pelvis   or left femur.             MACRO:   None        Signed by: Yefri Horan 12/19/2024 6:36 PM   Dictation workstation:   [id number]      XR pelvis 1-2 views   Final Result   No evidence of acute fracture or traumatic malalignment of the pelvis   or left femur.             MACRO:   None        Signed by: Yefri Horan 12/19/2024 6:36 PM   Dictation workstation:   [id number]      CT head wo IV contrast   Final Result   CT HEAD:   1. No evidence of hemorrhage, skull fracture, or other acute   intracranial trauma/abnormality.   2. Small geographic area of cystic encephalomalacia and gliosis is   present in the superior right frontal lobe, likely representing   sequela of prior infarct/injury.   3. Patchy attenuation changes present in the periventricular and   subcortical white matter of bilateral cerebral hemispheres likely   represent sequela of microvascular disease.        CT FACIAL BONES:   1. No evidence of acute facial trauma.   2. Completely opacification of the left maxillary sinus with mild   expansion of the left ostiomeatal unit. Correlate with symptoms of   sinusitis.        CT C-SPINE:   1. No evidence of acute trauma to the cervical spine.   2. Spondylotic changes of the cervical spine as detailed above.        MACRO:   None        Signed by: Yefri Horan 12/19/2024 6:28 PM   Dictation workstation:   [id number]       CT cervical spine wo IV contrast   Final Result   CT HEAD:   1. No evidence of hemorrhage, skull fracture, or other acute   intracranial trauma/abnormality.   2. Small geographic area of cystic encephalomalacia and gliosis is   present in the superior right frontal lobe, likely representing   sequela of prior infarct/injury.   3. Patchy attenuation changes present in the periventricular and   subcortical white matter of bilateral cerebral hemispheres likely   represent sequela of microvascular disease.        CT FACIAL BONES:   1. No evidence of acute facial trauma.   2. Completely opacification of the left maxillary sinus with mild   expansion of the left ostiomeatal unit. Correlate with symptoms of   sinusitis.        CT C-SPINE:   1. No evidence of acute trauma to the cervical spine.   2. Spondylotic changes of the cervical spine as detailed above.        MACRO:   None        Signed by: Yefri Horan 12/19/2024 6:28 PM   Dictation workstation:   [id number]      CT maxillofacial bones wo IV contrast   Final Result   CT HEAD:   1. No evidence of hemorrhage, skull fracture, or other acute   intracranial trauma/abnormality.   2. Small geographic area of cystic encephalomalacia and gliosis is   present in the superior right frontal lobe, likely representing   sequela of prior infarct/injury.   3. Patchy attenuation changes present in the periventricular and   subcortical white matter of bilateral cerebral hemispheres likely   represent sequela of microvascular disease.        CT FACIAL BONES:   1. No evidence of acute facial trauma.   2. Completely opacification of the left maxillary sinus with mild   expansion of the left ostiomeatal unit. Correlate with symptoms of   sinusitis.        CT C-SPINE:   1. No evidence of acute trauma to the cervical spine.   2. Spondylotic changes of the cervical spine as detailed above.        MACRO:   None        Signed by: Yefri Horan 12/19/2024 6:28 PM    Dictation workstation:   [id number]      CT 3D reconstruction   Final Result   CT HEAD:   1. No evidence of hemorrhage, skull fracture, or other acute   intracranial trauma/abnormality.   2. Small geographic area of cystic encephalomalacia and gliosis is   present in the superior right frontal lobe, likely representing   sequela of prior infarct/injury.   3. Patchy attenuation changes present in the periventricular and   subcortical white matter of bilateral cerebral hemispheres likely   represent sequela of microvascular disease.        CT FACIAL BONES:   1. No evidence of acute facial trauma.   2. Completely opacification of the left maxillary sinus with mild   expansion of the left ostiomeatal unit. Correlate with symptoms of   sinusitis.        CT C-SPINE:   1. No evidence of acute trauma to the cervical spine.   2. Spondylotic changes of the cervical spine as detailed above.        MACRO:   None        Signed by: Yefri Horan 12/19/2024 6:28 PM   Dictation workstation:   [id number]           The CT head shows no evidence of intracranial hemorrhage, mass effect or CVA.  No evidence of skull fracture.  No evidence of fracture or dislocation on CT C-spine.  No evidence of fracture or dislocation on CT maxillofacial.  Chest x-ray without evidence of acute process.  No evidence of pneumonia or pneumothorax.  No evidence of CHF.  No widening of the mediastinum.  Her pulses are equal bilaterally.  X-rays of the pelvis and femur show no evidence of fracture or dislocation.  Diagnostic labs without significant abnormality but the results of the D-dimer and urinalysis were pending at the time of my departure.        SOHEILA Gong will continue manage the patient primarily.  Anticipate disposition based on the results of the D-dimer, urinalysis and the interrogation of the patient's device.      Impression/diagnosis:  Fall from standing height  Left-sided chest pain  Left hip pain      Critical care time billing  is not warranted at this time      I personally saw the patient and made/approve the management plan and take responsibility for the patient management.      I independently interpreted the following study (S) EKG and diagnostic labs      I personally discussed the patient's management with the patient      I reviewed the results of the diagnostic labs and diagnostic imaging.  Formal radiology read was completed by the radiologist.      Seble Fermin MD

## 2024-12-20 ENCOUNTER — APPOINTMENT (OUTPATIENT)
Dept: CARDIOLOGY | Facility: HOSPITAL | Age: 79
DRG: 640 | End: 2024-12-20
Payer: MEDICARE

## 2024-12-20 ENCOUNTER — APPOINTMENT (OUTPATIENT)
Dept: RADIOLOGY | Facility: HOSPITAL | Age: 79
DRG: 640 | End: 2024-12-20
Payer: MEDICARE

## 2024-12-20 PROBLEM — R55 SYNCOPE: Status: ACTIVE | Noted: 2024-12-20

## 2024-12-20 LAB
ALBUMIN SERPL BCP-MCNC: 3.5 G/DL (ref 3.4–5)
ALP SERPL-CCNC: 74 U/L (ref 33–136)
ALT SERPL W P-5'-P-CCNC: 26 U/L (ref 7–45)
ANION GAP SERPL CALCULATED.3IONS-SCNC: 13 MMOL/L (ref 10–20)
AORTIC VALVE MEAN GRADIENT: 3 MMHG
AORTIC VALVE PEAK VELOCITY: 1.22 M/S
AST SERPL W P-5'-P-CCNC: 22 U/L (ref 9–39)
AV PEAK GRADIENT: 6 MMHG
AVA (PEAK VEL): 2.48 CM2
AVA (VTI): 2.28 CM2
BILIRUB SERPL-MCNC: 0.6 MG/DL (ref 0–1.2)
BUN SERPL-MCNC: 17 MG/DL (ref 6–23)
CALCIUM SERPL-MCNC: 8.6 MG/DL (ref 8.6–10.3)
CHLORIDE SERPL-SCNC: 106 MMOL/L (ref 98–107)
CO2 SERPL-SCNC: 20 MMOL/L (ref 21–32)
CREAT SERPL-MCNC: 0.68 MG/DL (ref 0.5–1.05)
EGFRCR SERPLBLD CKD-EPI 2021: 89 ML/MIN/1.73M*2
EJECTION FRACTION APICAL 4 CHAMBER: 22.6
EJECTION FRACTION: 18 %
ERYTHROCYTE [DISTWIDTH] IN BLOOD BY AUTOMATED COUNT: 13.7 % (ref 11.5–14.5)
GLUCOSE SERPL-MCNC: 134 MG/DL (ref 74–99)
HCT VFR BLD AUTO: 51 % (ref 36–46)
HGB BLD-MCNC: 16.2 G/DL (ref 12–16)
LEFT VENTRICLE INTERNAL DIMENSION DIASTOLE: 5.45 CM (ref 3.5–6)
LEFT VENTRICULAR OUTFLOW TRACT DIAMETER: 2.1 CM
LV EJECTION FRACTION BIPLANE: 26 %
MCH RBC QN AUTO: 30.3 PG (ref 26–34)
MCHC RBC AUTO-ENTMCNC: 31.8 G/DL (ref 32–36)
MCV RBC AUTO: 95 FL (ref 80–100)
NRBC BLD-RTO: 0 /100 WBCS (ref 0–0)
PLATELET # BLD AUTO: 271 X10*3/UL (ref 150–450)
POTASSIUM SERPL-SCNC: 4.2 MMOL/L (ref 3.5–5.3)
PROT SERPL-MCNC: 6 G/DL (ref 6.4–8.2)
RBC # BLD AUTO: 5.35 X10*6/UL (ref 4–5.2)
RIGHT VENTRICLE PEAK SYSTOLIC PRESSURE: 13.2 MMHG
SODIUM SERPL-SCNC: 135 MMOL/L (ref 136–145)
TRICUSPID ANNULAR PLANE SYSTOLIC EXCURSION: 1.8 CM
WBC # BLD AUTO: 8.5 X10*3/UL (ref 4.4–11.3)

## 2024-12-20 PROCEDURE — 97162 PT EVAL MOD COMPLEX 30 MIN: CPT | Mod: GP

## 2024-12-20 PROCEDURE — 36415 COLL VENOUS BLD VENIPUNCTURE: CPT | Performed by: INTERNAL MEDICINE

## 2024-12-20 PROCEDURE — C8929 TTE W OR WO FOL WCON,DOPPLER: HCPCS

## 2024-12-20 PROCEDURE — 2500000001 HC RX 250 WO HCPCS SELF ADMINISTERED DRUGS (ALT 637 FOR MEDICARE OP): Performed by: INTERNAL MEDICINE

## 2024-12-20 PROCEDURE — 93880 EXTRACRANIAL BILAT STUDY: CPT | Performed by: RADIOLOGY

## 2024-12-20 PROCEDURE — 97165 OT EVAL LOW COMPLEX 30 MIN: CPT | Mod: GO

## 2024-12-20 PROCEDURE — 97116 GAIT TRAINING THERAPY: CPT | Mod: GP

## 2024-12-20 PROCEDURE — 93880 EXTRACRANIAL BILAT STUDY: CPT

## 2024-12-20 PROCEDURE — 2500000004 HC RX 250 GENERAL PHARMACY W/ HCPCS (ALT 636 FOR OP/ED)

## 2024-12-20 PROCEDURE — 2500000001 HC RX 250 WO HCPCS SELF ADMINISTERED DRUGS (ALT 637 FOR MEDICARE OP)

## 2024-12-20 PROCEDURE — 80053 COMPREHEN METABOLIC PANEL: CPT | Performed by: INTERNAL MEDICINE

## 2024-12-20 PROCEDURE — 99223 1ST HOSP IP/OBS HIGH 75: CPT

## 2024-12-20 PROCEDURE — 2060000001 HC INTERMEDIATE ICU ROOM DAILY

## 2024-12-20 PROCEDURE — 99233 SBSQ HOSP IP/OBS HIGH 50: CPT | Performed by: INTERNAL MEDICINE

## 2024-12-20 PROCEDURE — 2500000004 HC RX 250 GENERAL PHARMACY W/ HCPCS (ALT 636 FOR OP/ED): Performed by: INTERNAL MEDICINE

## 2024-12-20 PROCEDURE — 93306 TTE W/DOPPLER COMPLETE: CPT | Performed by: INTERNAL MEDICINE

## 2024-12-20 PROCEDURE — 85027 COMPLETE CBC AUTOMATED: CPT | Performed by: INTERNAL MEDICINE

## 2024-12-20 PROCEDURE — 97535 SELF CARE MNGMENT TRAINING: CPT | Mod: GO

## 2024-12-20 RX ORDER — SACUBITRIL AND VALSARTAN 24; 26 MG/1; MG/1
0.5 TABLET, FILM COATED ORAL 2 TIMES DAILY
Status: DISCONTINUED | OUTPATIENT
Start: 2024-12-20 | End: 2024-12-21 | Stop reason: HOSPADM

## 2024-12-20 RX ORDER — METOPROLOL SUCCINATE 50 MG/1
50 TABLET, EXTENDED RELEASE ORAL DAILY
Status: DISCONTINUED | OUTPATIENT
Start: 2024-12-21 | End: 2024-12-21

## 2024-12-20 ASSESSMENT — ENCOUNTER SYMPTOMS
CONFUSION: 0
ABDOMINAL PAIN: 0
SHORTNESS OF BREATH: 0
WHEEZING: 0
WEAKNESS: 0
ADENOPATHY: 0
VOICE CHANGE: 0
COUGH: 0
BACK PAIN: 0
LIGHT-HEADEDNESS: 0
DIARRHEA: 0
FACIAL SWELLING: 0
ANAL BLEEDING: 0
FACIAL ASYMMETRY: 0
POLYPHAGIA: 0
POLYDIPSIA: 0
FATIGUE: 0
FLANK PAIN: 0
CONSTIPATION: 0
ABDOMINAL DISTENTION: 0
NERVOUS/ANXIOUS: 0
RHINORRHEA: 0
STRIDOR: 0
APPETITE CHANGE: 0
TREMORS: 0
MYALGIAS: 0
UNEXPECTED WEIGHT CHANGE: 0
SINUS PRESSURE: 0
BRUISES/BLEEDS EASILY: 0
SORE THROAT: 0
NECK PAIN: 0
AGITATION: 0
EYE DISCHARGE: 0
TROUBLE SWALLOWING: 0
COLOR CHANGE: 0
SPEECH DIFFICULTY: 0
NAUSEA: 0
WOUND: 0
CHEST TIGHTNESS: 0
PALPITATIONS: 0
APNEA: 0
HYPERACTIVE: 0
HEMATURIA: 0
EYE PAIN: 0
ACTIVITY CHANGE: 0
DYSPHORIC MOOD: 0
EYE ITCHING: 0
SEIZURES: 0
FREQUENCY: 0
DIFFICULTY URINATING: 0
BLOOD IN STOOL: 0
VOMITING: 0
ARTHRALGIAS: 0
DECREASED CONCENTRATION: 0
DIAPHORESIS: 0
CHILLS: 0
CHOKING: 0
HEADACHES: 0
NECK STIFFNESS: 0
DYSURIA: 0
SINUS PAIN: 0
EYE REDNESS: 0
RECTAL PAIN: 0
HALLUCINATIONS: 0
JOINT SWELLING: 0
FEVER: 0
NUMBNESS: 0
DIZZINESS: 1
PHOTOPHOBIA: 0
SLEEP DISTURBANCE: 0

## 2024-12-20 ASSESSMENT — COGNITIVE AND FUNCTIONAL STATUS - GENERAL
CLIMB 3 TO 5 STEPS WITH RAILING: A LITTLE
DAILY ACTIVITIY SCORE: 24
PERSONAL GROOMING: A LITTLE
DAILY ACTIVITIY SCORE: 24
DAILY ACTIVITIY SCORE: 19
STANDING UP FROM CHAIR USING ARMS: A LITTLE
MOBILITY SCORE: 24
DRESSING REGULAR UPPER BODY CLOTHING: A LITTLE
DRESSING REGULAR LOWER BODY CLOTHING: A LITTLE
HELP NEEDED FOR BATHING: A LITTLE
MOBILITY SCORE: 20
MOBILITY SCORE: 24
TOILETING: A LITTLE
WALKING IN HOSPITAL ROOM: A LITTLE
MOVING TO AND FROM BED TO CHAIR: A LITTLE

## 2024-12-20 ASSESSMENT — ACTIVITIES OF DAILY LIVING (ADL)
HOME_MANAGEMENT_TIME_ENTRY: 11
LACK_OF_TRANSPORTATION: NO
ADL_ASSISTANCE: INDEPENDENT
BATHING_ASSISTANCE: STAND BY
ADL_ASSISTANCE: INDEPENDENT

## 2024-12-20 ASSESSMENT — PAIN - FUNCTIONAL ASSESSMENT
PAIN_FUNCTIONAL_ASSESSMENT: 0-10
PAIN_FUNCTIONAL_ASSESSMENT: 0-10

## 2024-12-20 ASSESSMENT — PAIN SCALES - GENERAL
PAINLEVEL_OUTOF10: 0 - NO PAIN

## 2024-12-20 NOTE — PROGRESS NOTES
12/20/24 1601   Discharge Planning   Living Arrangements Spouse/significant other   Support Systems Spouse/significant other;Children   Assistance Needed Shopping and transportation.   Type of Residence Private residence   Number of Stairs to Enter Residence 2   Home or Post Acute Services None   Expected Discharge Disposition Home

## 2024-12-20 NOTE — CONSULTS
Nutrition Assessement Note    Nutrition Assessment    Reason for Assessment: Admission nursing screening    Reason for Hospital Admission:  Nevaeh Pfeiffer is a 79 y.o. female who is admitted for syncope and collapse. Daughter at bedside.     Malnutrition Screening Tool (MST)  Have you recently lost weight without trying?: Yes  If yes, how much weight have you lost?: Lost 24 - 33 pounds  Weight Loss Score: 3  Have you been eating poorly because of a decreased appetite?: Yes  Malnutrition Score: 4  Nutrition Screen  Stage 3 or 4 Pressure Injury or Multiple Non-Healing Wounds: No  Home Tube Feeding or Total Parenteral Nutrition (TPN): No  Dietitian Consult Needed: Yes (Comment)  Reason for Consult: weight loss    Past Medical History:   Diagnosis Date    Abdominal aortic aneurysm (AAA) without rupture (CMS-HCC) 11/12/2023    Atherosclerotic heart disease of native coronary artery with other forms of angina pectoris (CMS-HCC) 11/12/2023    Benign essential hypertension 11/12/2023    CAD S/P percutaneous coronary angioplasty 11/12/2023    Cardiac defibrillator in place 11/12/2023    Chronic systolic heart failure (Multi) 11/12/2023    Intermittent claudication (CMS-HCC) 11/12/2023    Shortness of breath 11/12/2023    Sustained ventricular tachycardia (Multi) 11/12/2023    Ventricular fibrillation (Multi) 11/12/2023      Past Surgical History:   Procedure Laterality Date    CARDIAC DEFIBRILLATOR PLACEMENT      CHOLECYSTECTOMY      CORONARY ANGIOPLASTY WITH STENT PLACEMENT      HYSTERECTOMY      PACEMAKER PLACEMENT       Nutrition History:  Food and Nutrient History: pt reports she does not eat much. daughter reports pt eats very little meat and protein. she also reports vomiting about once a week after finishing her coffee. states this turns into dry heaves. discussed nutrition supplements which pt and daughter are agreeable to. pt reports wanting to gain wt to decrease the loose skin/wrinkles on her  "arms.    Anthropometrics:  Ht: 175.3 cm (5' 9.02\"), Wt: 54.9 kg (121 lb), BMI: 17.86    Weight Change:  Daily Weight  12/19/24 : 54.9 kg (121 lb)  11/05/24 : 59.4 kg (131 lb)  07/08/24 : 61.7 kg (136 lb)  03/06/24 : 64 kg (141 lb)  11/14/23 : 65.1 kg (143 lb 9.6 oz)  11/14/23 : 64.4 kg (142 lb)  05/17/23 : 62.6 kg (138 lb)  01/24/23 : 63.5 kg (140 lb)  11/11/22 : 65.3 kg (144 lb)  10/03/22 : 67.1 kg (148 lb)     Weight History / % Weight Change: pt reports a 53# wt loss over 1 year. she reports a usual wt of 180#, daughter reprots pt's usual wt is ~160#. pt wt hx, pt has lost 22# (15.4%) wt loss over ~13 months (11/14/23-12/19/24) and has not been near her usual wt for the past couple years.  Significant Weight Loss: No (wt loss is non-significant yet undesirable)    Nutrition Focused Physical Exam Findings:   Subcutaneous Fat Loss  Orbital Fat Pads: Mild-Moderate (slight dark circles and slight hollowing)  Buccal Fat Pads: Mild-Moderate (flat cheeks, minimal bounce)  Triceps: Mild-Moderate (less than ample fat tissue)    Muscle Wasting  Temporalis: Severe (hollowed scooping depression)  Pectoralis (Clavicular Region): Mild-Moderate (some protrusion of clavicle)  Deltoid/Trapezius: Mild-Moderate (slight protrusion of acromion process)  Quadriceps: Mild-Moderate (mild depression on inner and outer thigh)  Gastrocnemius: Severe (minimal muscle definition)    Nutrition Significant Labs:  Lab Results   Component Value Date    WBC 8.5 12/20/2024    HGB 16.2 (H) 12/20/2024    HCT 51.0 (H) 12/20/2024     12/20/2024    CHOL 190 11/06/2024    TRIG 95 11/06/2024    HDL 54.2 11/06/2024    ALT 26 12/20/2024    AST 22 12/20/2024     (L) 12/20/2024    K 4.2 12/20/2024     12/20/2024    CREATININE 0.68 12/20/2024    BUN 17 12/20/2024    CO2 20 (L) 12/20/2024    TSH 1.59 06/23/2022    INR 0.9 08/22/2022     Nutrition Specific Medications:  aspirin, 81 mg, oral, Daily  dapagliflozin propanediol, 10 mg, oral, " Daily  docusate sodium, 100 mg, oral, BID  enoxaparin, 40 mg, subcutaneous, Daily  melatonin, 3 mg, oral, Nightly  [START ON 12/21/2024] metoprolol succinate XL, 50 mg, oral, Daily  mexiletine, 150 mg, oral, q8h  polyethylene glycol, 17 g, oral, Daily  pravastatin, 20 mg, oral, Daily  sacubitriL-valsartan, 0.5 tablet, oral, BID      potassium grvuntc-Z9-5.45%NaCl, 100 mL/hr, Last Rate: 100 mL/hr (12/20/24 0431)      Dietary Orders (From admission, onward)       Start     Ordered    12/20/24 1211  Oral nutritional supplements  Until discontinued        Comments: Vanilla   Question Answer Comment   Deliver with All meals    Select supplement: Ensure Plus High Protein        12/20/24 1210    12/19/24 2132  May Participate in Room Service  ( ROOM SERVICE MAY PARTICIPATE)  Once        Question:  .  Answer:  Yes    12/19/24 2131 12/19/24 2035  Adult diet Regular  Diet effective now        Question:  Diet type  Answer:  Regular    12/19/24 2037                  Estimated Needs:   Estimated Energy Needs  Total Energy Estimated Needs (kCal): 1650 kCal  Total Estimated Energy Need per Day (kCal/kg): 30 kCal/kg  Method for Estimating Needs: actual wt    Estimated Protein Needs  Total Protein Estimated Needs (g): 66 g  Total Protein Estimated Needs (g/kg): 1.2 g/kg  Method for Estimating Needs: actual wt    Estimated Fluid Needs  Method for Estimating Needs: 1 ml/kcal or per MD        Nutrition Diagnosis   Nutrition Diagnosis:  Malnutrition Diagnosis  Patient has Malnutrition Diagnosis: Yes  Diagnosis Status: New  Malnutrition Diagnosis: Severe malnutrition related to chronic disease or condition  As Evidenced by: moderate/severe subcutaneous fat loss and muscle wasting, non-significant yet undesirable 22# (15.4%) wt loss over ~13 months, po intake </= 75% estimated needs for >/= 1 month       Nutrition Interventions/Recommendations   Nutrition Interventions and Recommendations:    Nutrition Prescription:  Individualized  Nutrition Prescription Provided for : 1650 kcals and 66g protein to be provided via diet and supplements    Nutrition Interventions:   Food and/or Nutrient Delivery Interventions  Interventions: Meals and snacks, Medical food supplement  Meals and Snacks: General healthful diet  Goal: provide as ordered  Medical Food Supplement: Commercial beverage  Goal: vanilla ensure plus high protein TID to provide 350 kcals and 20g protein each    Education Documentation  No documentation found.           Nutrition Monitoring and Evaluation   Monitoring/Evaluation:   Food/Nutrient Related History Monitoring  Monitoring and Evaluation Plan: Energy intake  Energy Intake: Estimated energy intake  Criteria: pt to consume >/= 75% estimated needs    Body Composition/Growth/Weight History  Monitoring and Evaluation Plan: Weight  Weight: Measured weight  Criteria: pt will maintain/gain wt       Time Spent/Follow-up:   Follow Up  Time Spent (min): 30 minutes  Last Date of Nutrition Visit: 12/20/24  Nutrition Follow-Up Needed?: 3-5 days  Follow up Comment: 12/23/24

## 2024-12-20 NOTE — CONSULTS
"Reason For Consult  Syncope    History Of Present Illness  Nevaeh Pfeiffer is a 79 y.o. female presenting with syncope.  Patient has past medical history significant for heart failure systolic dysfunction ejection fraction of 20% status post ICD for primary and secondary prevention on mexiletine follows up with Dr. Tripathi. History of ischemic cardiomyopathy. Last 2 D echo this year showed ejection fraction of 20%. Patient underwent percutaneous coronary intervention to chronically occluded LAD In June 2022.  Patient tells me she was standing in her kitchen talking with her , her  told her she got a \"funny look on her face\" and patient subsequently lost consciousness and fell to the ground.  Patient has no recollection of feeling dizzy or lightheaded before the event.  She denies chest pain nausea or palpitations.  Patient had her device interrogated on November 18 of this year, there was 1 episode of ventricular tachycardia of 59 seconds patient was not shocked during this event.  Patient does not think her defibrillator went off, however she tells me she is unsure.    ED workup is rather benign.  Lab work is within normal limits.  High-sensitivity troponins are negative at 9 and 9.  Viral swabs are negative.  EKG showing an atrially paced rhythm no ST elevation or T wave inversions.  She had a CT of head facial bones and spine which do not show any acute pathologies.  Head CT does show a small geographic area of cystic encephalomalacia malacia and gliosis is present in the superior right frontal lobe likely a sequela of prior infarct as well as some evidence of microvascular disease.  Chest x-ray is negative for acute cardiopulmonary processes.  X-ray of femur and pelvis are negative for acute fracture as well.  D-dimer was elevated she then had a CT angio for PE which did not show pulmonary embolism.  Did demonstrate emphysematous changes throughout lungs.  Patient was admitted for further workup and " "evaluation.  We have been consulted for syncope.       Past Medical History  She has a past medical history of Abdominal aortic aneurysm (AAA) without rupture (CMS-HCC) (11/12/2023), Atherosclerotic heart disease of native coronary artery with other forms of angina pectoris (CMS-HCC) (11/12/2023), Benign essential hypertension (11/12/2023), CAD S/P percutaneous coronary angioplasty (11/12/2023), Cardiac defibrillator in place (11/12/2023), Chronic systolic heart failure (Multi) (11/12/2023), Intermittent claudication (CMS-HCC) (11/12/2023), Shortness of breath (11/12/2023), Sustained ventricular tachycardia (Multi) (11/12/2023), and Ventricular fibrillation (Multi) (11/12/2023).    Surgical History  She has a past surgical history that includes Coronary angioplasty with stent; pacemaker placement; Cardiac defibrillator placement; Cholecystectomy; and Hysterectomy.     Social History  She reports that she has been smoking cigarettes. She started smoking about 61 years ago. She has a 30.5 pack-year smoking history. She has never been exposed to tobacco smoke. She has never used smokeless tobacco. She reports that she does not currently use alcohol. She reports that she does not use drugs.    Family History  Family History   Problem Relation Name Age of Onset    Cancer Mother  61    Heart attack Father  67        Allergies  Lisinopril, Other, Penicillins, Statins-hmg-coa reductase inhibitors, and Warfarin    Review of Systems  Review of Systems       Physical Exam  Physical Exam      Last Recorded Vitals  Blood pressure 95/60, pulse 63, temperature 36.5 °C (97.7 °F), temperature source Temporal, resp. rate 16, height 1.753 m (5' 9.02\"), weight 54.9 kg (121 lb), SpO2 96%.    Relevant Results  Results for orders placed or performed during the hospital encounter of 12/19/24 (from the past 24 hours)   ECG 12 lead   Result Value Ref Range    Ventricular Rate 85 BPM    Atrial Rate 85 BPM    [place] Interval 168 ms    QRS Duration " 100 ms    QT Interval 368 ms    QTC Calculation(Bazett) 437 ms    P Axis 75 degrees    R Axis -83 degrees    T Axis 78 degrees    QRS Count 14 beats    Q Onset 215 ms    P Onset 131 ms    P Offset 184 ms    T Offset 399 ms    QTC Fredericia 413 ms   CBC and Auto Differential   Result Value Ref Range    WBC 9.4 4.4 - 11.3 x10*3/uL    nRBC 0.0 0.0 - 0.0 /100 WBCs    RBC 5.94 (H) 4.00 - 5.20 x10*6/uL    Hemoglobin 18.1 (H) 12.0 - 16.0 g/dL    Hematocrit 54.0 (H) 36.0 - 46.0 %    MCV 91 80 - 100 fL    MCH 30.5 26.0 - 34.0 pg    MCHC 33.5 32.0 - 36.0 g/dL    RDW 13.8 11.5 - 14.5 %    Platelets 363 150 - 450 x10*3/uL    Neutrophils % 67.3 40.0 - 80.0 %    Immature Granulocytes %, Automated 0.2 0.0 - 0.9 %    Lymphocytes % 19.1 13.0 - 44.0 %    Monocytes % 7.7 2.0 - 10.0 %    Eosinophils % 4.7 0.0 - 6.0 %    Basophils % 1.0 0.0 - 2.0 %    Neutrophils Absolute 6.29 (H) 1.60 - 5.50 x10*3/uL    Immature Granulocytes Absolute, Automated 0.02 0.00 - 0.50 x10*3/uL    Lymphocytes Absolute 1.79 0.80 - 3.00 x10*3/uL    Monocytes Absolute 0.72 0.05 - 0.80 x10*3/uL    Eosinophils Absolute 0.44 (H) 0.00 - 0.40 x10*3/uL    Basophils Absolute 0.09 0.00 - 0.10 x10*3/uL   Comprehensive metabolic panel   Result Value Ref Range    Glucose 110 (H) 74 - 99 mg/dL    Sodium 137 136 - 145 mmol/L    Potassium 3.8 3.5 - 5.3 mmol/L    Chloride 102 98 - 107 mmol/L    Bicarbonate 24 21 - 32 mmol/L    Anion Gap 15 10 - 20 mmol/L    Urea Nitrogen 18 6 - 23 mg/dL    Creatinine 0.83 0.50 - 1.05 mg/dL    eGFR 72 >60 mL/min/1.73m*2    Calcium 9.9 8.6 - 10.3 mg/dL    Albumin 4.3 3.4 - 5.0 g/dL    Alkaline Phosphatase 91 33 - 136 U/L    Total Protein 7.6 6.4 - 8.2 g/dL    AST 34 9 - 39 U/L    Bilirubin, Total 0.8 0.0 - 1.2 mg/dL    ALT 40 7 - 45 U/L   Troponin I, High Sensitivity, Initial   Result Value Ref Range    Troponin I, High Sensitivity 9 0 - 13 ng/L   Sars-CoV-2 and Influenza A/B PCR   Result Value Ref Range    Flu A Result Not Detected Not Detected     Flu B Result Not Detected Not Detected    Coronavirus 2019, PCR Not Detected Not Detected   Troponin, High Sensitivity, 1 Hour   Result Value Ref Range    Troponin I, High Sensitivity 9 0 - 13 ng/L   D-dimer, quantitative   Result Value Ref Range    D-Dimer Non VTE, Quant (mg/L FEU) 3.84 (H) 0.19 - 0.50 mg/L FEU   CBC   Result Value Ref Range    WBC 8.5 4.4 - 11.3 x10*3/uL    nRBC 0.0 0.0 - 0.0 /100 WBCs    RBC 5.35 (H) 4.00 - 5.20 x10*6/uL    Hemoglobin 16.2 (H) 12.0 - 16.0 g/dL    Hematocrit 51.0 (H) 36.0 - 46.0 %    MCV 95 80 - 100 fL    MCH 30.3 26.0 - 34.0 pg    MCHC 31.8 (L) 32.0 - 36.0 g/dL    RDW 13.7 11.5 - 14.5 %    Platelets 271 150 - 450 x10*3/uL   Comprehensive metabolic panel   Result Value Ref Range    Glucose 134 (H) 74 - 99 mg/dL    Sodium 135 (L) 136 - 145 mmol/L    Potassium 4.2 3.5 - 5.3 mmol/L    Chloride 106 98 - 107 mmol/L    Bicarbonate 20 (L) 21 - 32 mmol/L    Anion Gap 13 10 - 20 mmol/L    Urea Nitrogen 17 6 - 23 mg/dL    Creatinine 0.68 0.50 - 1.05 mg/dL    eGFR 89 >60 mL/min/1.73m*2    Calcium 8.6 8.6 - 10.3 mg/dL    Albumin 3.5 3.4 - 5.0 g/dL    Alkaline Phosphatase 74 33 - 136 U/L    Total Protein 6.0 (L) 6.4 - 8.2 g/dL    AST 22 9 - 39 U/L    Bilirubin, Total 0.6 0.0 - 1.2 mg/dL    ALT 26 7 - 45 U/L   Carotid duplex bilateral   Result Value Ref Range    BSA 1.63 m2          Assessment/Plan     Syncope and collapse  Ischemic cardiomyopathy  Systolic heart failure with a known ejection fraction of 20%  Coronary artery disease with PCI x 2 to LAD (2022)  History of V. tach on mexiletine    Overall impression/plan:    12/20: As above.  Patient with significant cardiac history admitted for syncope evaluation.  Patient tells me she was standing in the kitchen talking with her  when all of a sudden she lost consciousness.  She does not endorse any prodrome symptoms.  She does not think her device went off.  She denies chest pain shortness of breath or palpitations.  Patient has been  hypotensive blood pressures as low as 69/40.  Most recent today 95/60.  Would like to check orthostatic blood pressures.  In addition I have reduced some of her home meds including metoprolol succinate to 50 mg.  I have also reduced her Entresto to half a tablet starting tomorrow.  Patient had an echocardiogram in 2022 that resulted in an EF of 20%.  Will repeat this imaging at this time.  Would also like to interrogate patient's device, and it is [employer] so there should be no issue in obtaining this.  Discussed with nursing.  Upon patient's last device interrogation she did have an episode of V. tach that was about 59 seconds in length and was not shocked.  Patient remains in an intermittently atrially paced rhythm on telemetry.  There was 1 episode of V. tach about 4 beats in length and self terminating.  Carotid ultrasound has been ordered by primary.  Will await results of device interrogation and echocardiogram.  Will continue to follow with you.  Will discuss with Dr. Machado.    I spent 80 minutes in the professional and overall care of this patient.      Donna Avina PA-C

## 2024-12-20 NOTE — H&P
History Of Present Illness  Nevaeh Pfeiffer is a 79 y.o. female presenting with syncopal episode.  Patient has history of cardiac defibrillator.   who was with the patient states around 10 11 AM patient did not feel good felt dizzy and turned around and straight went down on the ground.  Patient has been dealing with flulike symptoms for past few days  Past medical history: MI, pacemaker/defibrillator, ejection fraction 20%, AAA 4.6 x 3.8,  Occupation: Retired volunteer at school  Social history: Smokes denies drinking denies drug   Past Medical History  Past Medical History:   Diagnosis Date    Abdominal aortic aneurysm (AAA) without rupture (CMS-HCC) 11/12/2023    Atherosclerotic heart disease of native coronary artery with other forms of angina pectoris (CMS-HCC) 11/12/2023    Benign essential hypertension 11/12/2023    CAD S/P percutaneous coronary angioplasty 11/12/2023    Cardiac defibrillator in place 11/12/2023    Chronic systolic heart failure (Multi) 11/12/2023    Intermittent claudication (CMS-HCC) 11/12/2023    Shortness of breath 11/12/2023    Sustained ventricular tachycardia (Multi) 11/12/2023    Ventricular fibrillation (Multi) 11/12/2023       Surgical History  Past Surgical History:   Procedure Laterality Date    CARDIAC DEFIBRILLATOR PLACEMENT      CHOLECYSTECTOMY      CORONARY ANGIOPLASTY WITH STENT PLACEMENT      HYSTERECTOMY      PACEMAKER PLACEMENT          Social History  She reports that she has been smoking cigarettes. She started smoking about 61 years ago. She has a 30.5 pack-year smoking history. She has never been exposed to tobacco smoke. She has never used smokeless tobacco. She reports that she does not currently use alcohol. She reports that she does not use drugs.    Family History  Family History   Problem Relation Name Age of Onset    Cancer Mother  61    Heart attack Father  67        Allergies  Lisinopril, Other, Penicillins, Statins-hmg-coa reductase inhibitors, and  Warfarin    Review of Systems   Constitutional:  Negative for activity change, appetite change, chills, diaphoresis, fatigue, fever and unexpected weight change.   HENT:  Negative for congestion, dental problem, drooling, ear discharge, ear pain, facial swelling, hearing loss, mouth sores, nosebleeds, postnasal drip, rhinorrhea, sinus pressure, sinus pain, sneezing, sore throat, tinnitus, trouble swallowing and voice change.    Eyes:  Negative for photophobia, pain, discharge, redness, itching and visual disturbance.   Respiratory:  Negative for apnea, cough, choking, chest tightness, shortness of breath, wheezing and stridor.    Cardiovascular:  Negative for chest pain, palpitations and leg swelling.   Gastrointestinal:  Negative for abdominal distention, abdominal pain, anal bleeding, blood in stool, constipation, diarrhea, nausea, rectal pain and vomiting.   Endocrine: Negative for cold intolerance, heat intolerance, polydipsia, polyphagia and polyuria.   Genitourinary:  Negative for decreased urine volume, difficulty urinating, dysuria, enuresis, flank pain, frequency, genital sores, hematuria and urgency.   Musculoskeletal:  Negative for arthralgias, back pain, gait problem, joint swelling, myalgias, neck pain and neck stiffness.   Skin:  Negative for color change, pallor, rash and wound.   Allergic/Immunologic: Negative for environmental allergies, food allergies and immunocompromised state.   Neurological:  Positive for dizziness. Negative for tremors, seizures, syncope, facial asymmetry, speech difficulty, weakness, light-headedness, numbness and headaches.   Hematological:  Negative for adenopathy. Does not bruise/bleed easily.   Psychiatric/Behavioral:  Negative for agitation, behavioral problems, confusion, decreased concentration, dysphoric mood, hallucinations, self-injury, sleep disturbance and suicidal ideas. The patient is not nervous/anxious and is not hyperactive.         Physical Exam  Vitals  "reviewed.   Constitutional:       Appearance: Normal appearance.   HENT:      Head: Normocephalic and atraumatic.      Right Ear: Tympanic membrane, ear canal and external ear normal.      Left Ear: Tympanic membrane, ear canal and external ear normal.      Nose: Nose normal.      Mouth/Throat:      Pharynx: Oropharynx is clear.   Eyes:      Extraocular Movements: Extraocular movements intact.      Conjunctiva/sclera: Conjunctivae normal.      Pupils: Pupils are equal, round, and reactive to light.   Cardiovascular:      Rate and Rhythm: Normal rate and regular rhythm.      Pulses: Normal pulses.      Heart sounds: Normal heart sounds.   Pulmonary:      Effort: Pulmonary effort is normal.      Breath sounds: Normal breath sounds.   Abdominal:      General: Abdomen is flat. Bowel sounds are normal.      Palpations: Abdomen is soft.   Musculoskeletal:      Cervical back: Normal range of motion and neck supple.   Skin:     General: Skin is warm and dry.   Neurological:      General: No focal deficit present.      Mental Status: She is alert and oriented to person, place, and time.   Psychiatric:         Mood and Affect: Mood normal.          Last Recorded Vitals  Blood pressure 99/55, pulse 59, temperature 36 °C (96.8 °F), temperature source Temporal, resp. rate 15, height 1.753 m (5' 9\"), weight 54.9 kg (121 lb), SpO2 95%.    Relevant Results        Scheduled medications  [START ON 12/20/2024] aspirin, 81 mg, oral, Daily  [START ON 12/20/2024] dapagliflozin propanediol, 10 mg, oral, Daily  docusate sodium, 100 mg, oral, BID  enoxaparin, 40 mg, subcutaneous, Daily  melatonin, 3 mg, oral, Nightly  [START ON 12/20/2024] metoprolol succinate XL, 100 mg, oral, Daily  mexiletine, 150 mg, oral, q8h  polyethylene glycol, 17 g, oral, Daily  [START ON 12/20/2024] pravastatin, 20 mg, oral, Daily  sacubitriL-valsartan, 1 tablet, oral, BID      Continuous medications  potassium gobfhyr-K9-2.45%NaCl, 100 mL/hr, Last Rate: 100 mL/hr " (12/19/24 1401)      PRN medications  PRN medications: acetaminophen **OR** acetaminophen **OR** acetaminophen, benzocaine-menthol, dextromethorphan-guaifenesin, guaiFENesin  Results for orders placed or performed during the hospital encounter of 12/19/24 (from the past 24 hours)   CBC and Auto Differential   Result Value Ref Range    WBC 9.4 4.4 - 11.3 x10*3/uL    nRBC 0.0 0.0 - 0.0 /100 WBCs    RBC 5.94 (H) 4.00 - 5.20 x10*6/uL    Hemoglobin 18.1 (H) 12.0 - 16.0 g/dL    Hematocrit 54.0 (H) 36.0 - 46.0 %    MCV 91 80 - 100 fL    MCH 30.5 26.0 - 34.0 pg    MCHC 33.5 32.0 - 36.0 g/dL    RDW 13.8 11.5 - 14.5 %    Platelets 363 150 - 450 x10*3/uL    Neutrophils % 67.3 40.0 - 80.0 %    Immature Granulocytes %, Automated 0.2 0.0 - 0.9 %    Lymphocytes % 19.1 13.0 - 44.0 %    Monocytes % 7.7 2.0 - 10.0 %    Eosinophils % 4.7 0.0 - 6.0 %    Basophils % 1.0 0.0 - 2.0 %    Neutrophils Absolute 6.29 (H) 1.60 - 5.50 x10*3/uL    Immature Granulocytes Absolute, Automated 0.02 0.00 - 0.50 x10*3/uL    Lymphocytes Absolute 1.79 0.80 - 3.00 x10*3/uL    Monocytes Absolute 0.72 0.05 - 0.80 x10*3/uL    Eosinophils Absolute 0.44 (H) 0.00 - 0.40 x10*3/uL    Basophils Absolute 0.09 0.00 - 0.10 x10*3/uL   Comprehensive metabolic panel   Result Value Ref Range    Glucose 110 (H) 74 - 99 mg/dL    Sodium 137 136 - 145 mmol/L    Potassium 3.8 3.5 - 5.3 mmol/L    Chloride 102 98 - 107 mmol/L    Bicarbonate 24 21 - 32 mmol/L    Anion Gap 15 10 - 20 mmol/L    Urea Nitrogen 18 6 - 23 mg/dL    Creatinine 0.83 0.50 - 1.05 mg/dL    eGFR 72 >60 mL/min/1.73m*2    Calcium 9.9 8.6 - 10.3 mg/dL    Albumin 4.3 3.4 - 5.0 g/dL    Alkaline Phosphatase 91 33 - 136 U/L    Total Protein 7.6 6.4 - 8.2 g/dL    AST 34 9 - 39 U/L    Bilirubin, Total 0.8 0.0 - 1.2 mg/dL    ALT 40 7 - 45 U/L   Troponin I, High Sensitivity, Initial   Result Value Ref Range    Troponin I, High Sensitivity 9 0 - 13 ng/L   Sars-CoV-2 and Influenza A/B PCR   Result Value Ref Range    Flu A  Result Not Detected Not Detected    Flu B Result Not Detected Not Detected    Coronavirus 2019, PCR Not Detected Not Detected   Troponin, High Sensitivity, 1 Hour   Result Value Ref Range    Troponin I, High Sensitivity 9 0 - 13 ng/L   D-dimer, quantitative   Result Value Ref Range    D-Dimer Non VTE, Quant (mg/L FEU) 3.84 (H) 0.19 - 0.50 mg/L FEU     CT angio chest for pulmonary embolism    Result Date: 12/19/2024  Interpreted By:  Finkelstein, Evan, STUDY: CT ANGIO CHEST FOR PULMONARY EMBOLISM;  12/19/2024 7:39 pm   INDICATION: Signs/Symptoms:syncope and elevated dimer. concern for PE.     COMPARISON: CT chest 12/15/2021. Chest radiograph 12/19/2024   ACCESSION NUMBER(S): [id number]   ORDERING CLINICIAN: TESS BROCK   TECHNIQUE: Axial CTA images of the chest after intravenous administration of 75 mL Omnipaque 350 using CT angiographic technique. Coronal and sagittal images are reconstructed. MIP images were created and reviewed.   FINDINGS: CHEST WALL AND LOWER NECK: Left chest wall pacemaker. Pectus excavatum. ABDOMEN: No acute abnormality of the partially visualized abdomen.   VASCULAR: AORTA: No aortic aneurysm or dissection.  Moderate atherosclerotic disease. PULMONARY ARTERY: Normal caliber. No pulmonary embolus to the segmental level. Reflux of contrast into the IVC and hepatic veins.   CHEST:   HEART: Normal size. No pericardial effusion. MEDIASTINUM AND ERIC: No pathologically enlarged thoracic lymph nodes. LUNG, PLEURA, LARGE AIRWAYS: Emphysematous changes throughout the lungs. 5 mm pulmonary nodule in the right lower lobe image 89 of series 4 stable compared to prior imaging in 2021. Mild bibasilar atelectasis. Curvilinear opacity in the upper aspect of the left lower lobe is similar compared to prior imaging in 2021 likely related to scarring.   BONES: No acute osseous abnormality.       No acute pulmonary embolus to the segmental level.   Redemonstrated emphysematous changes throughout the lungs  with a stable 5 mm pulmonary nodule in the right lower lobe and scarring in the left lower lobe.     MACRO: None.   Signed by: Evan Finkelstein 12/19/2024 8:22 PM Dictation workstation:   [id number]    XR femur left 2+ views    Result Date: 12/19/2024  Interpreted By:  Yefri Horan, STUDY: XR PELVIS 1-2 VIEWS; XR FEMUR LEFT 2+ VIEWS; ;  12/19/2024 6:19 pm   INDICATION: Signs/Symptoms:Fall, pain.     COMPARISON: CT of the abdomen and pelvis dated 11/04/2022   ACCESSION NUMBER(S): [id number]; [id number]   ORDERING CLINICIAN: DAVE HEREDIA   FINDINGS: Single AP radiographs of the pelvis and four views of the left femur were provided for interpretation.   Pelvis:   No acute fracture or traumatic malalignment is present. Pelvic ring is preserved. Heterotopic calcifications are present near the right inferior pubic ramus, likely representing sequela of remote injury. Mild joint space narrowing is present in the hips bilaterally. Soft tissues do not demonstrate any acute abnormality.   Left femur:   No fracture or traumatic malalignment is identified. Soft tissues do not demonstrate any acute abnormality. Visualized left knee joint is unremarkable in appearance.       No evidence of acute fracture or traumatic malalignment of the pelvis or left femur.     MACRO: None   Signed by: Yefri Horan 12/19/2024 6:36 PM Dictation workstation:   [id number]    XR pelvis 1-2 views    Result Date: 12/19/2024  Interpreted By:  Yefri Horan, STUDY: XR PELVIS 1-2 VIEWS; XR FEMUR LEFT 2+ VIEWS; ;  12/19/2024 6:19 pm   INDICATION: Signs/Symptoms:Fall, pain.     COMPARISON: CT of the abdomen and pelvis dated 11/04/2022   ACCESSION NUMBER(S): [id number]; [id number]   ORDERING CLINICIAN: DAVE HEREDIA   FINDINGS: Single AP radiographs of the pelvis and four views of the left femur were provided for interpretation.   Pelvis:   No acute fracture or traumatic malalignment is present. Pelvic ring is preserved.  Heterotopic calcifications are present near the right inferior pubic ramus, likely representing sequela of remote injury. Mild joint space narrowing is present in the hips bilaterally. Soft tissues do not demonstrate any acute abnormality.   Left femur:   No fracture or traumatic malalignment is identified. Soft tissues do not demonstrate any acute abnormality. Visualized left knee joint is unremarkable in appearance.       No evidence of acute fracture or traumatic malalignment of the pelvis or left femur.     MACRO: None   Signed by: Yefri Horan 12/19/2024 6:36 PM Dictation workstation:   [id number]    XR chest 1 view    Result Date: 12/19/2024  Interpreted By:  Yefri Horan, STUDY: XR CHEST 1 VIEW;  12/19/2024 6:19 pm   INDICATION: Signs/Symptoms:Syncope.     COMPARISON: Radiographs of the chest dated 06/21/2022.   ACCESSION NUMBER(S): [id number]   ORDERING CLINICIAN: DAVE HEREDIA   FINDINGS: AP radiograph of the chest was provided.   Dual lead left subclavian pacemaker/AICD is present.   CARDIOMEDIASTINAL SILHOUETTE: Cardiomediastinal silhouette is normal in size and configuration.   LUNGS: Lungs are clear, without evidence of consolidation or pleural effusion.   ABDOMEN: No remarkable upper abdominal findings.   BONES: No acute osseous changes.       1.  No evidence of acute cardiopulmonary process.       MACRO: None   Signed by: Yefri Horan 12/19/2024 6:29 PM Dictation workstation:   [id number]    CT head wo IV contrast    Result Date: 12/19/2024  Interpreted By:  Yefri Horan, STUDY: CT HEAD WO IV CONTRAST; CT FACIAL BONES WO IV CONTRAST; CT CERVICAL SPINE WO IV CONTRAST; CT 3D RECONSTRUCTION;  12/19/2024 6:11 pm   INDICATION: Signs/Symptoms:Syncope; Signs/Symptoms:syncope; Signs/Symptoms:3D FACIAL BONES.     COMPARISON: CT head dated 07/29/2013..   ACCESSION NUMBER(S): [id number]; [id number]; [id number]; [id number]   ORDERING CLINICIAN: TESS ACOSTA  GIOVANNA   TECHNIQUE: Noncontrast axial CT scan of head was performed, with coronal and sagittal reformats provided. The images were reviewed in bone, brain, blood and soft tissue windows.   Thin cut axial CT images through the facial bones were obtained and reconstructed in the coronal and sagittal plane. 3D reconstruction of the facial bones was created on a dedicated workstation and provided for interpretation.   Axial CT images of the cervical spine are obtained. Axial, coronal and sagittal reconstructions are provided for review.   FINDINGS: CT HEAD:   No hyperdense intracranial hemorrhage is identified. There is no mass effect or midline shift present.   Small geographic area of cystic encephalomalacia and gliosis is present in the posterior right frontal lobe, likely representing sequela of prior infarct/injury, new since prior exam in 2013. Patchy and confluent areas of decreased attenuation in the periventricular and subcortical white matter of bilateral cerebral hemispheres likely represent sequela of microvascular disease.   Otherwise, gray-white differentiation is intact, without evidence of acute CT apparent transcortical infarct.   Mild-to-moderate brain parenchymal volume loss is present without abnormal ventricular dilatation. Basal cisterns are patent. No extra-axial fluid collections are identified.   Scalp soft tissues do not demonstrate any acute abnormality. No acute calvarial abnormality is present. There is no evidence of skull fracture.   Mastoid air cells and middle ear cavities are clear.   CT FACIAL BONES:   Bony orbits are intact, without evidence of displaced fracture. Intraorbital structures and periorbital soft tissues are symmetric in appearance without evidence of acute trauma/abnormality, although small amount of emphysema is present near the left globe.   No facial or nasal bone fracture is identified.   There is complete opacification of the left maxillary sinus with slight  expansion of the left ostiomeatal unit. Small amount of layering fluid is present in the right maxillary sinus. There is opacification of scattered ethmoidal air cells.   Evaluation of the oral cavity is somewhat degraded by beam hardening artifact from patient's dental hardware. Within limitations of the study, no acute trauma is identified. Temporomandibular joints are intact.   Soft tissues of the skull base and face do not demonstrate any acute abnormalities.   CT C-SPINE:   There is mild reversal normal lordotic curvature of the cervical spine, with a 2-3 mm anterolisthesis of C3 on C4 and a 4-5 mm anterolisthesis of C4 on C5.   Cervical vertebral body heights are preserved without compression fractures, although some insufficiency endplate changes with Schmorl's nodes are present along the inferior endplate of C5 and C6.   There is no evidence of acute trauma to the posterior elements of the cervical spine. Craniocervical junction is intact. Facet joints are preserved without evidence of traumatic subluxation or perching, although multilevel degenerate facet osteoarthropathy is present, most pronounced at the level of C4-C5.   Multilevel intervertebral disc height loss is present, moderate to severe at C5-C6 and C6-C7.   No high-grade spinal canal stenosis is identified, although at least mild spinal canal narrowing is present at C4-C5, C5-C6 and C6-C7 due to combination of disc osteophyte complexes and spondylolisthesis, with some encroachment of the ventral cervical spinal cord.   Multilevel neural foraminal stenosis is present, worst at the level of C4-C5 with moderate to severe narrowing due to combination of spondylolisthesis and hypertrophic facet changes. Mild-to-moderate neural foraminal stenosis is present at C5-C6 due to hypertrophic uncovertebral and facet joint changes.   Prevertebral and paraspinal soft tissues do not demonstrate any acute abnormalities. Paraseptal and centrilobular emphysematous  changes are present in the lung apices bilaterally.       CT HEAD: 1. No evidence of hemorrhage, skull fracture, or other acute intracranial trauma/abnormality. 2. Small geographic area of cystic encephalomalacia and gliosis is present in the superior right frontal lobe, likely representing sequela of prior infarct/injury. 3. Patchy attenuation changes present in the periventricular and subcortical white matter of bilateral cerebral hemispheres likely represent sequela of microvascular disease.   CT FACIAL BONES: 1. No evidence of acute facial trauma. 2. Completely opacification of the left maxillary sinus with mild expansion of the left ostiomeatal unit. Correlate with symptoms of sinusitis.   CT C-SPINE: 1. No evidence of acute trauma to the cervical spine. 2. Spondylotic changes of the cervical spine as detailed above.   MACRO: None   Signed by: Yefri Horan 12/19/2024 6:28 PM Dictation workstation:   [id number]    CT cervical spine wo IV contrast    Result Date: 12/19/2024  Interpreted By:  Yefri Horan, STUDY: CT HEAD WO IV CONTRAST; CT FACIAL BONES WO IV CONTRAST; CT CERVICAL SPINE WO IV CONTRAST; CT 3D RECONSTRUCTION;  12/19/2024 6:11 pm   INDICATION: Signs/Symptoms:Syncope; Signs/Symptoms:syncope; Signs/Symptoms:3D FACIAL BONES.     COMPARISON: CT head dated 07/29/2013..   ACCESSION NUMBER(S): [id number]; [id number]; [id number]; [id number]   ORDERING CLINICIAN: TESS HEREDIA   TECHNIQUE: Noncontrast axial CT scan of head was performed, with coronal and sagittal reformats provided. The images were reviewed in bone, brain, blood and soft tissue windows.   Thin cut axial CT images through the facial bones were obtained and reconstructed in the coronal and sagittal plane. 3D reconstruction of the facial bones was created on a dedicated workstation and provided for interpretation.   Axial CT images of the cervical spine are obtained. Axial, coronal and sagittal  reconstructions are provided for review.   FINDINGS: CT HEAD:   No hyperdense intracranial hemorrhage is identified. There is no mass effect or midline shift present.   Small geographic area of cystic encephalomalacia and gliosis is present in the posterior right frontal lobe, likely representing sequela of prior infarct/injury, new since prior exam in 2013. Patchy and confluent areas of decreased attenuation in the periventricular and subcortical white matter of bilateral cerebral hemispheres likely represent sequela of microvascular disease.   Otherwise, gray-white differentiation is intact, without evidence of acute CT apparent transcortical infarct.   Mild-to-moderate brain parenchymal volume loss is present without abnormal ventricular dilatation. Basal cisterns are patent. No extra-axial fluid collections are identified.   Scalp soft tissues do not demonstrate any acute abnormality. No acute calvarial abnormality is present. There is no evidence of skull fracture.   Mastoid air cells and middle ear cavities are clear.   CT FACIAL BONES:   Bony orbits are intact, without evidence of displaced fracture. Intraorbital structures and periorbital soft tissues are symmetric in appearance without evidence of acute trauma/abnormality, although small amount of emphysema is present near the left globe.   No facial or nasal bone fracture is identified.   There is complete opacification of the left maxillary sinus with slight expansion of the left ostiomeatal unit. Small amount of layering fluid is present in the right maxillary sinus. There is opacification of scattered ethmoidal air cells.   Evaluation of the oral cavity is somewhat degraded by beam hardening artifact from patient's dental hardware. Within limitations of the study, no acute trauma is identified. Temporomandibular joints are intact.   Soft tissues of the skull base and face do not demonstrate any acute abnormalities.   CT C-SPINE:   There is mild reversal  normal lordotic curvature of the cervical spine, with a 2-3 mm anterolisthesis of C3 on C4 and a 4-5 mm anterolisthesis of C4 on C5.   Cervical vertebral body heights are preserved without compression fractures, although some insufficiency endplate changes with Schmorl's nodes are present along the inferior endplate of C5 and C6.   There is no evidence of acute trauma to the posterior elements of the cervical spine. Craniocervical junction is intact. Facet joints are preserved without evidence of traumatic subluxation or perching, although multilevel degenerate facet osteoarthropathy is present, most pronounced at the level of C4-C5.   Multilevel intervertebral disc height loss is present, moderate to severe at C5-C6 and C6-C7.   No high-grade spinal canal stenosis is identified, although at least mild spinal canal narrowing is present at C4-C5, C5-C6 and C6-C7 due to combination of disc osteophyte complexes and spondylolisthesis, with some encroachment of the ventral cervical spinal cord.   Multilevel neural foraminal stenosis is present, worst at the level of C4-C5 with moderate to severe narrowing due to combination of spondylolisthesis and hypertrophic facet changes. Mild-to-moderate neural foraminal stenosis is present at C5-C6 due to hypertrophic uncovertebral and facet joint changes.   Prevertebral and paraspinal soft tissues do not demonstrate any acute abnormalities. Paraseptal and centrilobular emphysematous changes are present in the lung apices bilaterally.       CT HEAD: 1. No evidence of hemorrhage, skull fracture, or other acute intracranial trauma/abnormality. 2. Small geographic area of cystic encephalomalacia and gliosis is present in the superior right frontal lobe, likely representing sequela of prior infarct/injury. 3. Patchy attenuation changes present in the periventricular and subcortical white matter of bilateral cerebral hemispheres likely represent sequela of microvascular disease.   CT  FACIAL BONES: 1. No evidence of acute facial trauma. 2. Completely opacification of the left maxillary sinus with mild expansion of the left ostiomeatal unit. Correlate with symptoms of sinusitis.   CT C-SPINE: 1. No evidence of acute trauma to the cervical spine. 2. Spondylotic changes of the cervical spine as detailed above.   MACRO: None   Signed by: Yefri Horan 12/19/2024 6:28 PM Dictation workstation:   [id number]    CT maxillofacial bones wo IV contrast    Result Date: 12/19/2024  Interpreted By:  Yefri Horan, STUDY: CT HEAD WO IV CONTRAST; CT FACIAL BONES WO IV CONTRAST; CT CERVICAL SPINE WO IV CONTRAST; CT 3D RECONSTRUCTION;  12/19/2024 6:11 pm   INDICATION: Signs/Symptoms:Syncope; Signs/Symptoms:syncope; Signs/Symptoms:3D FACIAL BONES.     COMPARISON: CT head dated 07/29/2013..   ACCESSION NUMBER(S): [id number]; [id number]; [id number]; [id number]   ORDERING CLINICIAN: TESS HEREDIA   TECHNIQUE: Noncontrast axial CT scan of head was performed, with coronal and sagittal reformats provided. The images were reviewed in bone, brain, blood and soft tissue windows.   Thin cut axial CT images through the facial bones were obtained and reconstructed in the coronal and sagittal plane. 3D reconstruction of the facial bones was created on a dedicated workstation and provided for interpretation.   Axial CT images of the cervical spine are obtained. Axial, coronal and sagittal reconstructions are provided for review.   FINDINGS: CT HEAD:   No hyperdense intracranial hemorrhage is identified. There is no mass effect or midline shift present.   Small geographic area of cystic encephalomalacia and gliosis is present in the posterior right frontal lobe, likely representing sequela of prior infarct/injury, new since prior exam in 2013. Patchy and confluent areas of decreased attenuation in the periventricular and subcortical white matter of bilateral cerebral hemispheres likely  represent sequela of microvascular disease.   Otherwise, gray-white differentiation is intact, without evidence of acute CT apparent transcortical infarct.   Mild-to-moderate brain parenchymal volume loss is present without abnormal ventricular dilatation. Basal cisterns are patent. No extra-axial fluid collections are identified.   Scalp soft tissues do not demonstrate any acute abnormality. No acute calvarial abnormality is present. There is no evidence of skull fracture.   Mastoid air cells and middle ear cavities are clear.   CT FACIAL BONES:   Bony orbits are intact, without evidence of displaced fracture. Intraorbital structures and periorbital soft tissues are symmetric in appearance without evidence of acute trauma/abnormality, although small amount of emphysema is present near the left globe.   No facial or nasal bone fracture is identified.   There is complete opacification of the left maxillary sinus with slight expansion of the left ostiomeatal unit. Small amount of layering fluid is present in the right maxillary sinus. There is opacification of scattered ethmoidal air cells.   Evaluation of the oral cavity is somewhat degraded by beam hardening artifact from patient's dental hardware. Within limitations of the study, no acute trauma is identified. Temporomandibular joints are intact.   Soft tissues of the skull base and face do not demonstrate any acute abnormalities.   CT C-SPINE:   There is mild reversal normal lordotic curvature of the cervical spine, with a 2-3 mm anterolisthesis of C3 on C4 and a 4-5 mm anterolisthesis of C4 on C5.   Cervical vertebral body heights are preserved without compression fractures, although some insufficiency endplate changes with Schmorl's nodes are present along the inferior endplate of C5 and C6.   There is no evidence of acute trauma to the posterior elements of the cervical spine. Craniocervical junction is intact. Facet joints are preserved without evidence of  traumatic subluxation or perching, although multilevel degenerate facet osteoarthropathy is present, most pronounced at the level of C4-C5.   Multilevel intervertebral disc height loss is present, moderate to severe at C5-C6 and C6-C7.   No high-grade spinal canal stenosis is identified, although at least mild spinal canal narrowing is present at C4-C5, C5-C6 and C6-C7 due to combination of disc osteophyte complexes and spondylolisthesis, with some encroachment of the ventral cervical spinal cord.   Multilevel neural foraminal stenosis is present, worst at the level of C4-C5 with moderate to severe narrowing due to combination of spondylolisthesis and hypertrophic facet changes. Mild-to-moderate neural foraminal stenosis is present at C5-C6 due to hypertrophic uncovertebral and facet joint changes.   Prevertebral and paraspinal soft tissues do not demonstrate any acute abnormalities. Paraseptal and centrilobular emphysematous changes are present in the lung apices bilaterally.       CT HEAD: 1. No evidence of hemorrhage, skull fracture, or other acute intracranial trauma/abnormality. 2. Small geographic area of cystic encephalomalacia and gliosis is present in the superior right frontal lobe, likely representing sequela of prior infarct/injury. 3. Patchy attenuation changes present in the periventricular and subcortical white matter of bilateral cerebral hemispheres likely represent sequela of microvascular disease.   CT FACIAL BONES: 1. No evidence of acute facial trauma. 2. Completely opacification of the left maxillary sinus with mild expansion of the left ostiomeatal unit. Correlate with symptoms of sinusitis.   CT C-SPINE: 1. No evidence of acute trauma to the cervical spine. 2. Spondylotic changes of the cervical spine as detailed above.   MACRO: None   Signed by: Yefri Horan 12/19/2024 6:28 PM Dictation workstation:   [id number]    CT 3D reconstruction    Result Date: 12/19/2024  Interpreted By:   Yefri Horan, STUDY: CT HEAD WO IV CONTRAST; CT FACIAL BONES WO IV CONTRAST; CT CERVICAL SPINE WO IV CONTRAST; CT 3D RECONSTRUCTION;  12/19/2024 6:11 pm   INDICATION: Signs/Symptoms:Syncope; Signs/Symptoms:syncope; Signs/Symptoms:3D FACIAL BONES.     COMPARISON: CT head dated 07/29/2013..   ACCESSION NUMBER(S): [id number]; [id number]; [id number]; [id number]   ORDERING CLINICIAN: TESS HEREDIA   TECHNIQUE: Noncontrast axial CT scan of head was performed, with coronal and sagittal reformats provided. The images were reviewed in bone, brain, blood and soft tissue windows.   Thin cut axial CT images through the facial bones were obtained and reconstructed in the coronal and sagittal plane. 3D reconstruction of the facial bones was created on a dedicated workstation and provided for interpretation.   Axial CT images of the cervical spine are obtained. Axial, coronal and sagittal reconstructions are provided for review.   FINDINGS: CT HEAD:   No hyperdense intracranial hemorrhage is identified. There is no mass effect or midline shift present.   Small geographic area of cystic encephalomalacia and gliosis is present in the posterior right frontal lobe, likely representing sequela of prior infarct/injury, new since prior exam in 2013. Patchy and confluent areas of decreased attenuation in the periventricular and subcortical white matter of bilateral cerebral hemispheres likely represent sequela of microvascular disease.   Otherwise, gray-white differentiation is intact, without evidence of acute CT apparent transcortical infarct.   Mild-to-moderate brain parenchymal volume loss is present without abnormal ventricular dilatation. Basal cisterns are patent. No extra-axial fluid collections are identified.   Scalp soft tissues do not demonstrate any acute abnormality. No acute calvarial abnormality is present. There is no evidence of skull fracture.   Mastoid air cells and middle ear cavities  are clear.   CT FACIAL BONES:   Bony orbits are intact, without evidence of displaced fracture. Intraorbital structures and periorbital soft tissues are symmetric in appearance without evidence of acute trauma/abnormality, although small amount of emphysema is present near the left globe.   No facial or nasal bone fracture is identified.   There is complete opacification of the left maxillary sinus with slight expansion of the left ostiomeatal unit. Small amount of layering fluid is present in the right maxillary sinus. There is opacification of scattered ethmoidal air cells.   Evaluation of the oral cavity is somewhat degraded by beam hardening artifact from patient's dental hardware. Within limitations of the study, no acute trauma is identified. Temporomandibular joints are intact.   Soft tissues of the skull base and face do not demonstrate any acute abnormalities.   CT C-SPINE:   There is mild reversal normal lordotic curvature of the cervical spine, with a 2-3 mm anterolisthesis of C3 on C4 and a 4-5 mm anterolisthesis of C4 on C5.   Cervical vertebral body heights are preserved without compression fractures, although some insufficiency endplate changes with Schmorl's nodes are present along the inferior endplate of C5 and C6.   There is no evidence of acute trauma to the posterior elements of the cervical spine. Craniocervical junction is intact. Facet joints are preserved without evidence of traumatic subluxation or perching, although multilevel degenerate facet osteoarthropathy is present, most pronounced at the level of C4-C5.   Multilevel intervertebral disc height loss is present, moderate to severe at C5-C6 and C6-C7.   No high-grade spinal canal stenosis is identified, although at least mild spinal canal narrowing is present at C4-C5, C5-C6 and C6-C7 due to combination of disc osteophyte complexes and spondylolisthesis, with some encroachment of the ventral cervical spinal cord.   Multilevel neural  foraminal stenosis is present, worst at the level of C4-C5 with moderate to severe narrowing due to combination of spondylolisthesis and hypertrophic facet changes. Mild-to-moderate neural foraminal stenosis is present at C5-C6 due to hypertrophic uncovertebral and facet joint changes.   Prevertebral and paraspinal soft tissues do not demonstrate any acute abnormalities. Paraseptal and centrilobular emphysematous changes are present in the lung apices bilaterally.       CT HEAD: 1. No evidence of hemorrhage, skull fracture, or other acute intracranial trauma/abnormality. 2. Small geographic area of cystic encephalomalacia and gliosis is present in the superior right frontal lobe, likely representing sequela of prior infarct/injury. 3. Patchy attenuation changes present in the periventricular and subcortical white matter of bilateral cerebral hemispheres likely represent sequela of microvascular disease.   CT FACIAL BONES: 1. No evidence of acute facial trauma. 2. Completely opacification of the left maxillary sinus with mild expansion of the left ostiomeatal unit. Correlate with symptoms of sinusitis.   CT C-SPINE: 1. No evidence of acute trauma to the cervical spine. 2. Spondylotic changes of the cervical spine as detailed above.   MACRO: None   Signed by: Yefri Horan 12/19/2024 6:28 PM Dictation workstation:   [id number]        Assessment/Plan   Assessment & Plan  Syncope    Hypercholesterolemia    Hypertension    Cardiomyopathy    Cardiac defibrillator in place    CAD S/P percutaneous coronary angioplasty      Will admit patient for observation  Cardiac monitoring  Rule out arrhythmia  Evaluate defibrillator  Continue home medication       I spent  minutes in the professional and overall care of this patient.      Nayla Thurman MD

## 2024-12-20 NOTE — PROGRESS NOTES
Physical Therapy Evaluation & Treatment    Patient Name: Nevaeh Pfeiffer  MRN: [id number]  Department: [address]  Room: [address] [place] [address]  Today's Date: 12/20/2024   Time Calculation  Start Time: 1321  Stop Time: 1358  Time Calculation (min): 37 min    Pt describes dizziness Sx during eval today as occurring both with head turns at rest and with changes of position. Details multiple falls at home which have resulting in her frequently crawling on the ground. Furniture walks as primary mode of mobility.    Orthostatics tested during session and are positive for Orthostatic hypotension. RN and MD made aware.    HINTS testing completed for possible vestibular involvement. Negative for head impulse test, negative vertical skew, but does show intermittent R beating horizontal nystagmus with gaze.  Nystagmus seems to be only intermittent and not reproducible on each attempt. Testing does not reproduce dizziness Sx. As HIT is negative, vestibular treatment not warranted at this time.    Significant time today taken for education on Orthostatic management/safety with activity. Education on the risks furniture walking poses for falls. Strongly recommend adaptive device use at this time, but Pt is disinterested in practicing this today.    Assessment/Plan   PT Assessment  PT Assessment Results: Decreased strength, Decreased endurance, Impaired balance, Decreased mobility, Decreased cognition, Impaired judgement, Decreased safety awareness, Decreased coordination, Impaired vision  Rehab Prognosis: Good  Barriers to Discharge Home: Physical needs  Physical Needs: Intermittent mobility assistance needed, High falls risk due to function or environment  Evaluation/Treatment Tolerance: Patient tolerated treatment well  Medical Staff Made Aware: Yes  Strengths: Premorbid level of function  Barriers to Participation: Comorbidities, Insight into problems  End of Session Communication: Bedside nurse  Assessment Comment: 79 year old female admits  with syncope, HLD, HTN, and cardiomyopathy. On eval, she demonstrates impaired safe mobility warranting skilled PT care. At DC, low intensity rehab is recommended.  End of Session Patient Position: Up in chair, Alarm on   IP OR SWING BED PT PLAN  Inpatient or Swing Bed: Inpatient  PT Plan  Treatment/Interventions: Transfer training, Gait training, Balance training, Strengthening, Endurance training, Therapeutic exercise, Therapeutic activity, Home exercise program  PT Plan: Ongoing PT  PT Frequency: 4 times per week  PT Discharge Recommendations: Low intensity level of continued care  Equipment Recommended upon Discharge: Wheeled walker  PT Recommended Transfer Status: Contact guard, Assistive device  PT - OK to Discharge: Yes      Subjective     General Visit Information:  General  Reason for Referral: Impaired Mobility  Referred By: Dr MIRIAM Thurman  Past Medical History Relevant to Rehab: CAD (EF=20%), ASHD, pacer, HTN, HLD, AAA, OA, +tobacco use  Family/Caregiver Present: No  Prior to Session Communication: Bedside nurse  Patient Position Received: Bed, 3 rail up, Alarm on  Preferred Learning Style: verbal, visual  General Comment: 79 year old female admits with syncope, HLD, HTN, and cardiomyopathy.  Home Living:  Home Living  Type of Home: House  Lives With: Spouse (health issues reported)  Home Adaptive Equipment: Walker rolling or standard, Cane, Wheelchair-manual, Reacher, Long-handled shoehorn  Home Layout: Multi-level, Able to live on main level with bedroom/bathroom  Home Access: Stairs to enter with rails  Entrance Stairs-Rails: Right  Entrance Stairs-Number of Steps: 2  Bathroom Shower/Tub: Tub/shower unit  Bathroom Toilet: Standard, Other (Comment)  Bathroom Equipment: Grab bars in shower, Shower chair with back  Prior Level of Function:  Prior Function Per Pt/Caregiver Report  Level of [place]: Independent with ADLs and functional transfers, Independent with homemaking with ambulation  Receives Help  "From: Family  ADL Assistance: Independent  Homemaking Assistance: Independent  Ambulatory Assistance: Independent  Prior Function Comments: No AD \"thats for old people\" but endorses falling at home and crawling on the floor  Precautions:  Precautions  Hearing/Visual Limitations: +corrective lenses  Medical Precautions: Fall precautions    Vital Signs (Past 2hrs)        Date/Time Vitals Session Patient Position Pulse Resp SpO2 BP MAP (mmHg)    12/20/24 1321 During PT  --  61  --  --  117/65  80     12/20/24 1322 During PT  --  63  --  --  98/63  76     12/20/24 1333 During PT  --  61  --  --  94/72  80                   Vital Signs Comment: Standing x3 mins    Objective   Pain:  Pain Assessment  Pain Assessment: 0-10  0-10 (Numeric) Pain Score: 0 - No pain  Cognition:  Cognition  Overall Cognitive Status: Impaired  Orientation Level: Oriented X4  Cognition Comments: Mildly confused?  Memory: Exceptions to WFL  Short-Term Memory: Impaired  Insight: Moderate  Impulsive: Moderately    General Assessments:  Activity Tolerance  Endurance: Decreased tolerance for upright activites    Sensation  Light Touch: No apparent deficits    Strength  Strength Comments: Grossly 4/5 BLEs on MMT  Functional Assessments:  Bed Mobility  Bed Mobility: Yes  Bed Mobility 1  Bed Mobility 1: Supine to sitting  Level of Assistance 1: Modified independent    Transfers  Transfer: Yes  Transfer 1  Transfer From 1: Bed to  Transfer to 1: Stand  Technique 1: Sit to stand, Stand to sit  Transfer Level of Assistance 1: Close supervision  Trials/Comments 1: Very impulsive and quick to move. x2 trials    Ambulation/Gait Training  Ambulation/Gait Training Performed: Yes  Ambulation/Gait Training 1  Surface 1: Level tile  Device 1: No device  Assistance 1: Contact guard  Quality of Gait 1:  (slow, shuffled, and unsteady. Reaches out to grab onto furniture to steady herself during gait. CGA only for this trial but certainly a fall risk)  Comments/Distance " (ft) 1: 75    Treatments:  Ambulation/Gait Training  Ambulation/Gait Training Performed: Yes  Ambulation/Gait Training 1  Surface 1: Level tile  Device 1: No device  Assistance 1: Contact guard  Quality of Gait 1:  (slow, shuffled, and unsteady. Reaches out to grab onto furniture to steady herself during gait. CGA only for this trial but certainly a fall risk)  Comments/Distance (ft) 1: 75    Significant education as below  Outcome Measures:  [facility] Basic Mobility  Turning from your back to your side while in a flat bed without using bedrails: None  Moving from lying on your back to sitting on the side of a flat bed without using bedrails: None  Moving to and from bed to chair (including a wheelchair): A little  Standing up from a chair using your arms (e.g. wheelchair or bedside chair): A little  To walk in hospital room: A little  Climbing 3-5 steps with railing: A little  Basic Mobility - Total Score: 20    Encounter Problems       Encounter Problems (Active)       Balance       Standing Balance (Progressing)       Start:  12/20/24    Expected End:  01/03/25       Pt will demonstrate good static standing balance to promote safe participation with out of bed activity, transfers, and mobility              Mobility       Ambulation (Progressing)       Start:  12/20/24    Expected End:  01/03/25       Pt will ambulate 50' modified independent assist with walker to promote safe home mobility              PT Transfers       Sit to stand (Progressing)       Start:  12/20/24    Expected End:  01/03/25       Pt will transfer sit to standing position with modified independent assist and walker to promote safe out of bed activity              Safety       Safe Mobility Techniques (Progressing)       Start:  12/20/24    Expected End:  01/03/25       Pt will correctly identify and demonstrate safe mobility techniques to reduce their risks for falls during their acute care stay                   Education  Documentation  Mobility Training, taught by Richie Torres, PT at 12/20/2024  2:21 PM.  Learner: Patient  Readiness: Acceptance  Method: Explanation, Demonstration  Response: Needs Reinforcement  Comment: safe mobility techniques    Education Comments  No comments found.

## 2024-12-20 NOTE — PROGRESS NOTES
Evaluation/Treatment    Patient Name: Nevaeh Pfeiffer  MRN: [id number]  Department: [address]  Room: [facility]-A  Today's Date: 12/20/24  Time Calculation  Start Time: 0814  Stop Time: 0840  Time Calculation (min): 26 min       Assessment:  OT Assessment: 80 yo female admitted for a workup following a syncopal episode presents slightly below baseline functional status d/t impaired activity tolerance and generalized weakness.  Pt would benefit from in-house OT services to provide ADL retraining utilizing compensatory techniques and progressive strengthening in order to return home safely and at baseline functional status of independence.  Prognosis: Good  Barriers to Discharge Home: Physical needs  Physical Needs: High falls risk due to function or environment  Evaluation/Treatment Tolerance: Patient limited by fatigue  Medical Staff Made Aware: Yes  End of Session Communication: Bedside nurse  End of Session Patient Position: Up in chair, Alarm on  OT Assessment Results: Decreased ADL status, Decreased endurance, Decreased functional mobility, Decreased IADLs  Prognosis: Good  Evaluation/Treatment Tolerance: Patient limited by fatigue  Medical Staff Made Aware: Yes  Strengths: Ability to acquire knowledge, Premorbid level of function  Barriers to Participation: Comorbidities    Plan:  Treatment Interventions: ADL retraining, Functional transfer training, UE strengthening/ROM, Endurance training, Patient/family training, Equipment evaluation/education, Compensatory technique education  OT Frequency: 3 times per week  OT Discharge Recommendations: No OT needed after discharge  Equipment Recommended upon Discharge: Other (comment) (TBD)  OT Recommended Transfer Status: Stand by assist  OT - OK to Discharge: Yes  Treatment Interventions: ADL retraining, Functional transfer training, UE strengthening/ROM, Endurance training, Patient/family training, Equipment evaluation/education, Compensatory technique education      Subjective    Current Problem:  1. Fall, initial encounter        2. Chest pain, unspecified type        3. Left hip pain        4. Nausea and vomiting, unspecified vomiting type        5. Carotid sinus syncope  Carotid duplex bilateral    Carotid duplex bilateral      6. Occlusion and stenosis of cerebellar arteries  Carotid duplex bilateral    Carotid duplex bilateral      7. Syncope, unspecified syncope type  Cardiac device check - Inpatient    Cardiac device check - Inpatient        General:   OT Received On: 12/20/24  General  Reason for Referral: Impaired ADLs, syncope  Referred By: Dr MIRIAM Thurman  Past Medical History Relevant to Rehab: CAD (EF=20%), ASHD, pacer, HTN, HLD, AAA, OA, +tobacco use  Family/Caregiver Present: No  Prior to Session Communication: Bedside nurse  Patient Position Received: On cart  Preferred Learning Style: verbal, visual  General Comment: Cleared by nursing and agreeable for therapy     Precautions:  Hearing/Visual Limitations: +corrective lenses  Medical Precautions: Fall precautions    Pain:  Pain Assessment  Pain Assessment: 0-10  0-10 (Numeric) Pain Score: 0 - No pain      Objective     Cognition:  Overall Cognitive Status: Within Functional Limits  Orientation Level: Oriented X4    Home Living:  Type of Home: House  Lives With: Spouse (has health issues)  Home Adaptive Equipment: Walker rolling or standard, Cane, Wheelchair-manual, Reacher, Long-handled shoehorn  Home Layout: Multi-level, Able to live on main level with bedroom/bathroom  Home Access: Stairs to enter with rails  Entrance Stairs-Rails: Right  Entrance Stairs-Number of Steps: 2  Bathroom Shower/Tub: Tub/shower unit  Bathroom Toilet: Standard, Other (Comment) (grab bars in front)  Bathroom Equipment: Grab bars in shower, Shower chair with back  Home Living Comments: Pt lives in a ranch house with a basement which she does not utilize.    Prior Function:  Level of [place]: Independent with ADLs and functional transfers,  "Independent with homemaking with ambulation  Receives Help From: Other (Comment) (Daughter - assists with shopping and provides transportation)  ADL Assistance: Independent  Homemaking Assistance: Independent  Ambulatory Assistance: Independent  Hand Dominance: Right  Prior Function Comments: Pt does not drive.    ADL:  Eating Assistance: Independent  Grooming Assistance: Stand by  Grooming Deficit: Setup  Bathing Assistance: Stand by  Bathing Deficit: Setup, Supervision/safety  UE Dressing Assistance: Stand by  UE Dressing Deficit: Setup  LE Dressing Assistance: Stand by  LE Dressing Deficit: Setup, Supervision/safety (donned socks with setup assist seated edge of bed - remainder of ADLs=per clinical judgement this date)  Toileting Assistance with Device: Stand by  Toileting Deficit: Supervison/safety    Activity Tolerance:  Endurance: Decreased tolerance for upright activites  Activity Tolerance Comments: Fatigues easily    Bed Mobility/Transfers: Bed Mobility  Bed Mobility: Yes  Bed Mobility 1  Bed Mobility 1: Supine to sitting  Level of Assistance 1: Modified independent  Bed Mobility Comments 1: toward the right with head elevated ~20 degrees    Transfers  Transfer: Yes  Transfer 1  Transfer From 1: Bed to  Transfer to 1: Stand  Technique 1: Sit to stand  Transfer Level of Assistance 1: Close supervision  Transfers 2  Transfer From 2: Toilet to  Transfer to 2: Stand  Technique 2: Sit to stand, Stand to sit  Transfer Device 2:  (grab bar)  Transfer Level of Assistance 2: Close supervision  Transfers 3  Transfer From 3: Stand to  Transfer to 3: Sit, Chair with arms  Technique 3: Stand to sit  Transfer Level of Assistance 3: Close supervision    Functional Mobility:  Functional Mobility  Functional Mobility Performed:  (Close supervision in bedroom and bathroom without an A.D. ~30.  Gait slightly unsteady but no loss of balance:  \"my legs are weak.\")    Sensation:  Light Touch: No apparent deficits  Sensation " Comment: Denies tingling/numbness    Strength:  Strength Comments: BUEs=~4/5 grossly    Hand Function:  Hand Function  Gross Grasp: Functional  Coordination: Functional    Extremities: RUE   RUE : Exceptions to WFL (limited shoulder flexion s/p remote fall) and LUE   LUE: Within Functional Limits    Outcome Measures: [facility] Daily Activity  Putting on and taking off regular lower body clothing: A little  Bathing (including washing, rinsing, drying): A little  Putting on and taking off regular upper body clothing: A little  Toileting, which includes using toilet, bedpan or urinal: A little  Taking care of personal grooming such as brushing teeth: A little  Eating Meals: None  Daily Activity - Total Score: 19    Education Documentation  ADL Training, taught by Deanna Pierre OT at 12/20/2024  9:05 AM.  Learner: Patient  Readiness: Acceptance  Method: Explanation  Response: Demonstrated Understanding  Comment: ADL and functional transfer/mobility retraining initiated including instructions in home safety for fall prevention    OP EDUCATION:  Education  Individual(s) Educated: Patient  Education Provided: Fall precautons, Risk and benefits of OT discussed with patient or other, POC discussed and agreed upon  Risk and Benefits Discussed with Patient/Caregiver/Other: yes  Patient/Caregiver Demonstrated Understanding: yes  Plan of Care Discussed and Agreed Upon: yes  Patient Response to Education: Patient/Caregiver Verbalized Understanding of Information    Goals:  Encounter Problems       Encounter Problems (Active)       OT Goals       ADLs (Progressing)       Start:  12/20/24    Expected End:  12/27/24       Patient will complete ADL tasks, with independent using AE need in order to increase patient's safety and independence with self-care tasks.           Functional transfers (Progressing)       Start:  12/20/24    Expected End:  12/27/24       Patient will complete functional transfers with independent using least  restrictive device, in order to increase patient's safety and independence with daily tasks.           Activity tolerance (Progressing)       Start:  12/20/24    Expected End:  12/27/24       Patient will demonstrate the ability to participate in functional activity at least >/= 25 minutes in order to increase patient's safety and independence with daily tasks.

## 2024-12-21 VITALS
SYSTOLIC BLOOD PRESSURE: 88 MMHG | RESPIRATION RATE: 18 BRPM | HEIGHT: 69 IN | HEART RATE: 61 BPM | OXYGEN SATURATION: 98 % | TEMPERATURE: 98.1 F | BODY MASS INDEX: 19.1 KG/M2 | WEIGHT: 128.97 LBS | DIASTOLIC BLOOD PRESSURE: 76 MMHG

## 2024-12-21 PROBLEM — F17.200 TOBACCO USE DISORDER: Status: ACTIVE | Noted: 2024-12-21

## 2024-12-21 LAB
ANION GAP SERPL CALCULATED.3IONS-SCNC: 11 MMOL/L (ref 10–20)
BUN SERPL-MCNC: 17 MG/DL (ref 6–23)
CALCIUM SERPL-MCNC: 8.8 MG/DL (ref 8.6–10.3)
CHLORIDE SERPL-SCNC: 104 MMOL/L (ref 98–107)
CO2 SERPL-SCNC: 27 MMOL/L (ref 21–32)
CREAT SERPL-MCNC: 0.77 MG/DL (ref 0.5–1.05)
EGFRCR SERPLBLD CKD-EPI 2021: 79 ML/MIN/1.73M*2
ERYTHROCYTE [DISTWIDTH] IN BLOOD BY AUTOMATED COUNT: 13.4 % (ref 11.5–14.5)
GLUCOSE SERPL-MCNC: 92 MG/DL (ref 74–99)
HCT VFR BLD AUTO: 46.6 % (ref 36–46)
HGB BLD-MCNC: 15.5 G/DL (ref 12–16)
MCH RBC QN AUTO: 30.3 PG (ref 26–34)
MCHC RBC AUTO-ENTMCNC: 33.3 G/DL (ref 32–36)
MCV RBC AUTO: 91 FL (ref 80–100)
NRBC BLD-RTO: 0 /100 WBCS (ref 0–0)
PLATELET # BLD AUTO: 280 X10*3/UL (ref 150–450)
POTASSIUM SERPL-SCNC: 4.5 MMOL/L (ref 3.5–5.3)
RBC # BLD AUTO: 5.12 X10*6/UL (ref 4–5.2)
SODIUM SERPL-SCNC: 137 MMOL/L (ref 136–145)
WBC # BLD AUTO: 8 X10*3/UL (ref 4.4–11.3)

## 2024-12-21 PROCEDURE — 82435 ASSAY OF BLOOD CHLORIDE: CPT | Performed by: INTERNAL MEDICINE

## 2024-12-21 PROCEDURE — 99407 BEHAV CHNG SMOKING > 10 MIN: CPT | Performed by: INTERNAL MEDICINE

## 2024-12-21 PROCEDURE — 85027 COMPLETE CBC AUTOMATED: CPT | Performed by: INTERNAL MEDICINE

## 2024-12-21 PROCEDURE — 36415 COLL VENOUS BLD VENIPUNCTURE: CPT | Performed by: INTERNAL MEDICINE

## 2024-12-21 PROCEDURE — 2500000001 HC RX 250 WO HCPCS SELF ADMINISTERED DRUGS (ALT 637 FOR MEDICARE OP): Performed by: INTERNAL MEDICINE

## 2024-12-21 PROCEDURE — 99233 SBSQ HOSP IP/OBS HIGH 50: CPT | Performed by: INTERNAL MEDICINE

## 2024-12-21 PROCEDURE — 99238 HOSP IP/OBS DSCHRG MGMT 30/<: CPT | Performed by: INTERNAL MEDICINE

## 2024-12-21 PROCEDURE — 2500000004 HC RX 250 GENERAL PHARMACY W/ HCPCS (ALT 636 FOR OP/ED): Performed by: INTERNAL MEDICINE

## 2024-12-21 PROCEDURE — 2500000001 HC RX 250 WO HCPCS SELF ADMINISTERED DRUGS (ALT 637 FOR MEDICARE OP)

## 2024-12-21 RX ORDER — METOPROLOL SUCCINATE 25 MG/1
25 TABLET, EXTENDED RELEASE ORAL DAILY
Qty: 30 TABLET | Refills: 0 | Status: SHIPPED | OUTPATIENT
Start: 2024-12-22

## 2024-12-21 RX ORDER — METOPROLOL SUCCINATE 25 MG/1
25 TABLET, EXTENDED RELEASE ORAL DAILY
Status: DISCONTINUED | OUTPATIENT
Start: 2024-12-21 | End: 2024-12-21 | Stop reason: HOSPADM

## 2024-12-21 RX ORDER — SACUBITRIL AND VALSARTAN 24; 26 MG/1; MG/1
0.5 TABLET, FILM COATED ORAL 2 TIMES DAILY
Start: 2024-12-21

## 2024-12-21 ASSESSMENT — PAIN SCALES - GENERAL
PAINLEVEL_OUTOF10: 0 - NO PAIN

## 2024-12-21 ASSESSMENT — COGNITIVE AND FUNCTIONAL STATUS - GENERAL
DAILY ACTIVITIY SCORE: 24
MOBILITY SCORE: 24

## 2024-12-21 ASSESSMENT — PAIN SCALES - WONG BAKER: WONGBAKER_NUMERICALRESPONSE: NO HURT

## 2024-12-21 NOTE — ASSESSMENT & PLAN NOTE
Smoking cessation counseling was performed.  The patient was counseled on the harms of smoking, including increased risk for lung disease, cardiovascular disease and cancer. In  the context of the disease, smoking is associated with a greater pain, worse joint damage, and worse response to biological therapy.  About 11 to 15 minute on smoking cessation and counseling to patient and family.

## 2024-12-21 NOTE — PROGRESS NOTES
Spiritual Care Visit  Spiritual Care Request    Reason for Visit:  Routine Visit: Introduction     Request Received From:       Focus of Care:  Visited With: Patient not available         Refer to :          Spiritual Care Assessment    Spiritual Assessment:                      Care Provided:       Sense of Community and or Adventist Affiliation:  Yarsanism   Values/Beliefs  Spiritual Requests During Hospitalization: Nevaeh was having Patiente Care when I tried to see her today.     Addressed Needs/Concerns and/or [place] Through:          Outcome:        Advance Directives:         Spiritual Care Annotation    Annotation:  Nevaeh was having Patient Care when I tried to see her today.  Abelino Godwin

## 2024-12-21 NOTE — CARE PLAN
The patient's goals for the shift include  same as nurse    The clinical goals for the shift include maintain hemodynamic stability    Over the shift, the patient did not make progress toward the following goals. Barriers to progression include none. Recommendations to address these barriers include none.

## 2024-12-21 NOTE — PROGRESS NOTES
"Nevaeh Pfeiffer is a 79 y.o. female on day 1 of admission presenting with Syncope.    Subjective   Patient was orthostatic.  With the fluids she is feeling better.  Her dizziness is feeling little better.  Her nausea is feeling little better still has poor appetite       Objective     Physical Exam  Vitals reviewed.   Constitutional:       Appearance: Normal appearance.   HENT:      Head: Normocephalic and atraumatic.      Right Ear: Tympanic membrane, ear canal and external ear normal.      Left Ear: Tympanic membrane, ear canal and external ear normal.      Nose: Nose normal.      Mouth/Throat:      Pharynx: Oropharynx is clear.   Eyes:      Extraocular Movements: Extraocular movements intact.      Conjunctiva/sclera: Conjunctivae normal.      Pupils: Pupils are equal, round, and reactive to light.   Cardiovascular:      Rate and Rhythm: Normal rate and regular rhythm.      Pulses: Normal pulses.      Heart sounds: Normal heart sounds.   Pulmonary:      Effort: Pulmonary effort is normal.      Breath sounds: Normal breath sounds.   Abdominal:      General: Abdomen is flat. Bowel sounds are normal.      Palpations: Abdomen is soft.   Musculoskeletal:      Cervical back: Normal range of motion and neck supple.   Skin:     General: Skin is warm and dry.   Neurological:      General: No focal deficit present.      Mental Status: She is alert and oriented to person, place, and time.   Psychiatric:         Mood and Affect: Mood normal.         Last Recorded Vitals  Blood pressure 107/61, pulse 62, temperature 36.7 °C (98.1 °F), temperature source Temporal, resp. rate 17, height 1.753 m (5' 9.02\"), weight 54.9 kg (121 lb), SpO2 95%.  Intake/Output last 3 Shifts:  I/O last 3 completed shifts:  In: 1105 (20.1 mL/kg) [IV Piggyback:1105]  Out: - (0 mL/kg)   Weight: 54.9 kg     Relevant Results               Scheduled medications  aspirin, 81 mg, oral, Daily  dapagliflozin propanediol, 10 mg, oral, Daily  docusate sodium, 100 " mg, oral, BID  enoxaparin, 40 mg, subcutaneous, Daily  melatonin, 3 mg, oral, Nightly  [START ON 12/21/2024] metoprolol succinate XL, 50 mg, oral, Daily  mexiletine, 150 mg, oral, q8h  polyethylene glycol, 17 g, oral, Daily  pravastatin, 20 mg, oral, Daily  sacubitriL-valsartan, 0.5 tablet, oral, BID      Continuous medications     PRN medications  PRN medications: acetaminophen **OR** acetaminophen **OR** acetaminophen, benzocaine-menthol, dextromethorphan-guaifenesin, guaiFENesin  Results for orders placed or performed during the hospital encounter of 12/19/24 (from the past 24 hours)   CBC   Result Value Ref Range    WBC 8.5 4.4 - 11.3 x10*3/uL    nRBC 0.0 0.0 - 0.0 /100 WBCs    RBC 5.35 (H) 4.00 - 5.20 x10*6/uL    Hemoglobin 16.2 (H) 12.0 - 16.0 g/dL    Hematocrit 51.0 (H) 36.0 - 46.0 %    MCV 95 80 - 100 fL    MCH 30.3 26.0 - 34.0 pg    MCHC 31.8 (L) 32.0 - 36.0 g/dL    RDW 13.7 11.5 - 14.5 %    Platelets 271 150 - 450 x10*3/uL   Comprehensive metabolic panel   Result Value Ref Range    Glucose 134 (H) 74 - 99 mg/dL    Sodium 135 (L) 136 - 145 mmol/L    Potassium 4.2 3.5 - 5.3 mmol/L    Chloride 106 98 - 107 mmol/L    Bicarbonate 20 (L) 21 - 32 mmol/L    Anion Gap 13 10 - 20 mmol/L    Urea Nitrogen 17 6 - 23 mg/dL    Creatinine 0.68 0.50 - 1.05 mg/dL    eGFR 89 >60 mL/min/1.73m*2    Calcium 8.6 8.6 - 10.3 mg/dL    Albumin 3.5 3.4 - 5.0 g/dL    Alkaline Phosphatase 74 33 - 136 U/L    Total Protein 6.0 (L) 6.4 - 8.2 g/dL    AST 22 9 - 39 U/L    Bilirubin, Total 0.6 0.0 - 1.2 mg/dL    ALT 26 7 - 45 U/L   Transthoracic Echo (TTE) Complete   Result Value Ref Range    BSA 1.63 m2     Carotid duplex bilateral    Result Date: 12/20/2024  Interpreted By:  Lauro Duffy, STUDY: [facility] US CAROTID ARTERY DUPLEX BILATERAL;  12/20/2024 8:05 am   INDICATION: Signs/Symptoms:syncope.   COMPARISON: 07/30/2013.   ACCESSION NUMBER(S): [id number]   ORDERING CLINICIAN: TENNILLE LAGOS   TECHNIQUE: Vascular ultrasound of the  extracranial carotid system was performed bilaterally.  Gray scale, color Doppler and spectral Doppler waveform analysis was performed.   FINDINGS: RIGHT: On the right  mild atherosclerotic plaque is present. Right ICA peak velocities not significantly elevated. The peak systolic velocities are as follows:   RIGHT SIDE PEAK SYSTOLIC VELOCITY TABLE: CCA 63 cm/sec. ICA 58 cm/sec. ECA 57 cm/sec.   The ratio of the peak systolic velocity of the right ICA/CCA is 0.9.   RIGHT VERTEBRAL ARTERY: The right vertebral artery demonstrates proximal normal anterograde flow   LEFT: On the left  mild atherosclerotic plaque is present. Left ICA peak velocities not significantly elevated. The peak systolic velocities are as follows:   LEFT SIDE PEAK SYSTOLIC VELOCITY TABLE: CCA 72 cm/sec. ICA 66 cm/sec. ECA 66 cm/sec.   The ratio of the peak systolic velocity of the left ICA/CCA is 0.9.   LEFT VERTEBRAL ARTERY: The left vertebral artery demonstrates proximal normal anterograde flow       Estimated ICA stenosis is less than 50% bilaterally.   MACRO: None   Signed by: Lauro Duffy 12/20/2024 3:56 PM Dictation workstation:   [id number]    ECG 12 lead    Result Date: 12/20/2024  Sinus rhythm with Premature atrial complexes with Aberrant conduction Biatrial enlargement Left axis deviation Inferior infarct , age undetermined Anterolateral infarct (cited on or before 29-OCT-2013) Abnormal ECG When compared with ECG of 14-NOV-2023 16:05, Significant changes have occurred    CT angio chest for pulmonary embolism    Result Date: 12/19/2024  Interpreted By:  Finkelstein, Evan, STUDY: CT ANGIO CHEST FOR PULMONARY EMBOLISM;  12/19/2024 7:39 pm   INDICATION: Signs/Symptoms:syncope and elevated dimer. concern for PE.     COMPARISON: CT chest 12/15/2021. Chest radiograph 12/19/2024   ACCESSION NUMBER(S): [id number]   ORDERING CLINICIAN: TESS BROCK   TECHNIQUE: Axial CTA images of the chest after intravenous administration of 75 mL  Omnipaque 350 using CT angiographic technique. Coronal and sagittal images are reconstructed. MIP images were created and reviewed.   FINDINGS: CHEST WALL AND LOWER NECK: Left chest wall pacemaker. Pectus excavatum. ABDOMEN: No acute abnormality of the partially visualized abdomen.   VASCULAR: AORTA: No aortic aneurysm or dissection.  Moderate atherosclerotic disease. PULMONARY ARTERY: Normal caliber. No pulmonary embolus to the segmental level. Reflux of contrast into the IVC and hepatic veins.   CHEST:   HEART: Normal size. No pericardial effusion. MEDIASTINUM AND ERIC: No pathologically enlarged thoracic lymph nodes. LUNG, PLEURA, LARGE AIRWAYS: Emphysematous changes throughout the lungs. 5 mm pulmonary nodule in the right lower lobe image 89 of series 4 stable compared to prior imaging in 2021. Mild bibasilar atelectasis. Curvilinear opacity in the upper aspect of the left lower lobe is similar compared to prior imaging in 2021 likely related to scarring.   BONES: No acute osseous abnormality.       No acute pulmonary embolus to the segmental level.   Redemonstrated emphysematous changes throughout the lungs with a stable 5 mm pulmonary nodule in the right lower lobe and scarring in the left lower lobe.     MACRO: None.   Signed by: Evan Finkelstein 12/19/2024 8:22 PM Dictation workstation:   [id number]    XR femur left 2+ views    Result Date: 12/19/2024  Interpreted By:  Yefri Horan, STUDY: XR PELVIS 1-2 VIEWS; XR FEMUR LEFT 2+ VIEWS; ;  12/19/2024 6:19 pm   INDICATION: Signs/Symptoms:Fall, pain.     COMPARISON: CT of the abdomen and pelvis dated 11/04/2022   ACCESSION NUMBER(S): [id number]; [id number]   ORDERING CLINICIAN: DAVE HEREDIA   FINDINGS: Single AP radiographs of the pelvis and four views of the left femur were provided for interpretation.   Pelvis:   No acute fracture or traumatic malalignment is present. Pelvic ring is preserved. Heterotopic calcifications are present near the right  inferior pubic ramus, likely representing sequela of remote injury. Mild joint space narrowing is present in the hips bilaterally. Soft tissues do not demonstrate any acute abnormality.   Left femur:   No fracture or traumatic malalignment is identified. Soft tissues do not demonstrate any acute abnormality. Visualized left knee joint is unremarkable in appearance.       No evidence of acute fracture or traumatic malalignment of the pelvis or left femur.     MACRO: None   Signed by: Yefri Horan 12/19/2024 6:36 PM Dictation workstation:   [id number]    XR pelvis 1-2 views    Result Date: 12/19/2024  Interpreted By:  Yefri Horan, STUDY: XR PELVIS 1-2 VIEWS; XR FEMUR LEFT 2+ VIEWS; ;  12/19/2024 6:19 pm   INDICATION: Signs/Symptoms:Fall, pain.     COMPARISON: CT of the abdomen and pelvis dated 11/04/2022   ACCESSION NUMBER(S): [id number]; [id number]   ORDERING CLINICIAN: DAVE HEREDIA   FINDINGS: Single AP radiographs of the pelvis and four views of the left femur were provided for interpretation.   Pelvis:   No acute fracture or traumatic malalignment is present. Pelvic ring is preserved. Heterotopic calcifications are present near the right inferior pubic ramus, likely representing sequela of remote injury. Mild joint space narrowing is present in the hips bilaterally. Soft tissues do not demonstrate any acute abnormality.   Left femur:   No fracture or traumatic malalignment is identified. Soft tissues do not demonstrate any acute abnormality. Visualized left knee joint is unremarkable in appearance.       No evidence of acute fracture or traumatic malalignment of the pelvis or left femur.     MACRO: None   Signed by: Yefri Horan 12/19/2024 6:36 PM Dictation workstation:   [id number]    XR chest 1 view    Result Date: 12/19/2024  Interpreted By:  Yefri Horan, STUDY: XR CHEST 1 VIEW;  12/19/2024 6:19 pm   INDICATION: Signs/Symptoms:Syncope.     COMPARISON: Radiographs of  the chest dated 06/21/2022.   ACCESSION NUMBER(S): [id number]   ORDERING CLINICIAN: DAVE HEREDIA   FINDINGS: AP radiograph of the chest was provided.   Dual lead left subclavian pacemaker/AICD is present.   CARDIOMEDIASTINAL SILHOUETTE: Cardiomediastinal silhouette is normal in size and configuration.   LUNGS: Lungs are clear, without evidence of consolidation or pleural effusion.   ABDOMEN: No remarkable upper abdominal findings.   BONES: No acute osseous changes.       1.  No evidence of acute cardiopulmonary process.       MACRO: None   Signed by: Yefri Horan 12/19/2024 6:29 PM Dictation workstation:   [id number]    CT head wo IV contrast    Result Date: 12/19/2024  Interpreted By:  Yefri Horan, STUDY: CT HEAD WO IV CONTRAST; CT FACIAL BONES WO IV CONTRAST; CT CERVICAL SPINE WO IV CONTRAST; CT 3D RECONSTRUCTION;  12/19/2024 6:11 pm   INDICATION: Signs/Symptoms:Syncope; Signs/Symptoms:syncope; Signs/Symptoms:3D FACIAL BONES.     COMPARISON: CT head dated 07/29/2013..   ACCESSION NUMBER(S): [id number]; [id number]; [id number]; [id number]   ORDERING CLINICIAN: TESS HEREDIA   TECHNIQUE: Noncontrast axial CT scan of head was performed, with coronal and sagittal reformats provided. The images were reviewed in bone, brain, blood and soft tissue windows.   Thin cut axial CT images through the facial bones were obtained and reconstructed in the coronal and sagittal plane. 3D reconstruction of the facial bones was created on a dedicated workstation and provided for interpretation.   Axial CT images of the cervical spine are obtained. Axial, coronal and sagittal reconstructions are provided for review.   FINDINGS: CT HEAD:   No hyperdense intracranial hemorrhage is identified. There is no mass effect or midline shift present.   Small geographic area of cystic encephalomalacia and gliosis is present in the posterior right frontal lobe, likely representing sequela of prior  infarct/injury, new since prior exam in 2013. Patchy and confluent areas of decreased attenuation in the periventricular and subcortical white matter of bilateral cerebral hemispheres likely represent sequela of microvascular disease.   Otherwise, gray-white differentiation is intact, without evidence of acute CT apparent transcortical infarct.   Mild-to-moderate brain parenchymal volume loss is present without abnormal ventricular dilatation. Basal cisterns are patent. No extra-axial fluid collections are identified.   Scalp soft tissues do not demonstrate any acute abnormality. No acute calvarial abnormality is present. There is no evidence of skull fracture.   Mastoid air cells and middle ear cavities are clear.   CT FACIAL BONES:   Bony orbits are intact, without evidence of displaced fracture. Intraorbital structures and periorbital soft tissues are symmetric in appearance without evidence of acute trauma/abnormality, although small amount of emphysema is present near the left globe.   No facial or nasal bone fracture is identified.   There is complete opacification of the left maxillary sinus with slight expansion of the left ostiomeatal unit. Small amount of layering fluid is present in the right maxillary sinus. There is opacification of scattered ethmoidal air cells.   Evaluation of the oral cavity is somewhat degraded by beam hardening artifact from patient's dental hardware. Within limitations of the study, no acute trauma is identified. Temporomandibular joints are intact.   Soft tissues of the skull base and face do not demonstrate any acute abnormalities.   CT C-SPINE:   There is mild reversal normal lordotic curvature of the cervical spine, with a 2-3 mm anterolisthesis of C3 on C4 and a 4-5 mm anterolisthesis of C4 on C5.   Cervical vertebral body heights are preserved without compression fractures, although some insufficiency endplate changes with Schmorl's nodes are present along the inferior  endplate of C5 and C6.   There is no evidence of acute trauma to the posterior elements of the cervical spine. Craniocervical junction is intact. Facet joints are preserved without evidence of traumatic subluxation or perching, although multilevel degenerate facet osteoarthropathy is present, most pronounced at the level of C4-C5.   Multilevel intervertebral disc height loss is present, moderate to severe at C5-C6 and C6-C7.   No high-grade spinal canal stenosis is identified, although at least mild spinal canal narrowing is present at C4-C5, C5-C6 and C6-C7 due to combination of disc osteophyte complexes and spondylolisthesis, with some encroachment of the ventral cervical spinal cord.   Multilevel neural foraminal stenosis is present, worst at the level of C4-C5 with moderate to severe narrowing due to combination of spondylolisthesis and hypertrophic facet changes. Mild-to-moderate neural foraminal stenosis is present at C5-C6 due to hypertrophic uncovertebral and facet joint changes.   Prevertebral and paraspinal soft tissues do not demonstrate any acute abnormalities. Paraseptal and centrilobular emphysematous changes are present in the lung apices bilaterally.       CT HEAD: 1. No evidence of hemorrhage, skull fracture, or other acute intracranial trauma/abnormality. 2. Small geographic area of cystic encephalomalacia and gliosis is present in the superior right frontal lobe, likely representing sequela of prior infarct/injury. 3. Patchy attenuation changes present in the periventricular and subcortical white matter of bilateral cerebral hemispheres likely represent sequela of microvascular disease.   CT FACIAL BONES: 1. No evidence of acute facial trauma. 2. Completely opacification of the left maxillary sinus with mild expansion of the left ostiomeatal unit. Correlate with symptoms of sinusitis.   CT C-SPINE: 1. No evidence of acute trauma to the cervical spine. 2. Spondylotic changes of the cervical spine  as detailed above.   MACRO: None   Signed by: Yefri Horan 12/19/2024 6:28 PM Dictation workstation:   [id number]    CT cervical spine wo IV contrast    Result Date: 12/19/2024  Interpreted By:  Yefri Horan, STUDY: CT HEAD WO IV CONTRAST; CT FACIAL BONES WO IV CONTRAST; CT CERVICAL SPINE WO IV CONTRAST; CT 3D RECONSTRUCTION;  12/19/2024 6:11 pm   INDICATION: Signs/Symptoms:Syncope; Signs/Symptoms:syncope; Signs/Symptoms:3D FACIAL BONES.     COMPARISON: CT head dated 07/29/2013..   ACCESSION NUMBER(S): [id number]; [id number]; [id number]; [id number]   ORDERING CLINICIAN: TESS HEREDIA   TECHNIQUE: Noncontrast axial CT scan of head was performed, with coronal and sagittal reformats provided. The images were reviewed in bone, brain, blood and soft tissue windows.   Thin cut axial CT images through the facial bones were obtained and reconstructed in the coronal and sagittal plane. 3D reconstruction of the facial bones was created on a dedicated workstation and provided for interpretation.   Axial CT images of the cervical spine are obtained. Axial, coronal and sagittal reconstructions are provided for review.   FINDINGS: CT HEAD:   No hyperdense intracranial hemorrhage is identified. There is no mass effect or midline shift present.   Small geographic area of cystic encephalomalacia and gliosis is present in the posterior right frontal lobe, likely representing sequela of prior infarct/injury, new since prior exam in 2013. Patchy and confluent areas of decreased attenuation in the periventricular and subcortical white matter of bilateral cerebral hemispheres likely represent sequela of microvascular disease.   Otherwise, gray-white differentiation is intact, without evidence of acute CT apparent transcortical infarct.   Mild-to-moderate brain parenchymal volume loss is present without abnormal ventricular dilatation. Basal cisterns are patent. No extra-axial fluid collections  are identified.   Scalp soft tissues do not demonstrate any acute abnormality. No acute calvarial abnormality is present. There is no evidence of skull fracture.   Mastoid air cells and middle ear cavities are clear.   CT FACIAL BONES:   Bony orbits are intact, without evidence of displaced fracture. Intraorbital structures and periorbital soft tissues are symmetric in appearance without evidence of acute trauma/abnormality, although small amount of emphysema is present near the left globe.   No facial or nasal bone fracture is identified.   There is complete opacification of the left maxillary sinus with slight expansion of the left ostiomeatal unit. Small amount of layering fluid is present in the right maxillary sinus. There is opacification of scattered ethmoidal air cells.   Evaluation of the oral cavity is somewhat degraded by beam hardening artifact from patient's dental hardware. Within limitations of the study, no acute trauma is identified. Temporomandibular joints are intact.   Soft tissues of the skull base and face do not demonstrate any acute abnormalities.   CT C-SPINE:   There is mild reversal normal lordotic curvature of the cervical spine, with a 2-3 mm anterolisthesis of C3 on C4 and a 4-5 mm anterolisthesis of C4 on C5.   Cervical vertebral body heights are preserved without compression fractures, although some insufficiency endplate changes with Schmorl's nodes are present along the inferior endplate of C5 and C6.   There is no evidence of acute trauma to the posterior elements of the cervical spine. Craniocervical junction is intact. Facet joints are preserved without evidence of traumatic subluxation or perching, although multilevel degenerate facet osteoarthropathy is present, most pronounced at the level of C4-C5.   Multilevel intervertebral disc height loss is present, moderate to severe at C5-C6 and C6-C7.   No high-grade spinal canal stenosis is identified, although at least mild spinal  canal narrowing is present at C4-C5, C5-C6 and C6-C7 due to combination of disc osteophyte complexes and spondylolisthesis, with some encroachment of the ventral cervical spinal cord.   Multilevel neural foraminal stenosis is present, worst at the level of C4-C5 with moderate to severe narrowing due to combination of spondylolisthesis and hypertrophic facet changes. Mild-to-moderate neural foraminal stenosis is present at C5-C6 due to hypertrophic uncovertebral and facet joint changes.   Prevertebral and paraspinal soft tissues do not demonstrate any acute abnormalities. Paraseptal and centrilobular emphysematous changes are present in the lung apices bilaterally.       CT HEAD: 1. No evidence of hemorrhage, skull fracture, or other acute intracranial trauma/abnormality. 2. Small geographic area of cystic encephalomalacia and gliosis is present in the superior right frontal lobe, likely representing sequela of prior infarct/injury. 3. Patchy attenuation changes present in the periventricular and subcortical white matter of bilateral cerebral hemispheres likely represent sequela of microvascular disease.   CT FACIAL BONES: 1. No evidence of acute facial trauma. 2. Completely opacification of the left maxillary sinus with mild expansion of the left ostiomeatal unit. Correlate with symptoms of sinusitis.   CT C-SPINE: 1. No evidence of acute trauma to the cervical spine. 2. Spondylotic changes of the cervical spine as detailed above.   MACRO: None   Signed by: Yefri Horan 12/19/2024 6:28 PM Dictation workstation:   [id number]    CT maxillofacial bones wo IV contrast    Result Date: 12/19/2024  Interpreted By:  Yefri Horan, STUDY: CT HEAD WO IV CONTRAST; CT FACIAL BONES WO IV CONTRAST; CT CERVICAL SPINE WO IV CONTRAST; CT 3D RECONSTRUCTION;  12/19/2024 6:11 pm   INDICATION: Signs/Symptoms:Syncope; Signs/Symptoms:syncope; Signs/Symptoms:3D FACIAL BONES.     COMPARISON: CT head dated 07/29/2013..    ACCESSION NUMBER(S): [id number]; [id number]; [id number]; [id number]   ORDERING CLINICIAN: TESS HEREDIA   TECHNIQUE: Noncontrast axial CT scan of head was performed, with coronal and sagittal reformats provided. The images were reviewed in bone, brain, blood and soft tissue windows.   Thin cut axial CT images through the facial bones were obtained and reconstructed in the coronal and sagittal plane. 3D reconstruction of the facial bones was created on a dedicated workstation and provided for interpretation.   Axial CT images of the cervical spine are obtained. Axial, coronal and sagittal reconstructions are provided for review.   FINDINGS: CT HEAD:   No hyperdense intracranial hemorrhage is identified. There is no mass effect or midline shift present.   Small geographic area of cystic encephalomalacia and gliosis is present in the posterior right frontal lobe, likely representing sequela of prior infarct/injury, new since prior exam in 2013. Patchy and confluent areas of decreased attenuation in the periventricular and subcortical white matter of bilateral cerebral hemispheres likely represent sequela of microvascular disease.   Otherwise, gray-white differentiation is intact, without evidence of acute CT apparent transcortical infarct.   Mild-to-moderate brain parenchymal volume loss is present without abnormal ventricular dilatation. Basal cisterns are patent. No extra-axial fluid collections are identified.   Scalp soft tissues do not demonstrate any acute abnormality. No acute calvarial abnormality is present. There is no evidence of skull fracture.   Mastoid air cells and middle ear cavities are clear.   CT FACIAL BONES:   Bony orbits are intact, without evidence of displaced fracture. Intraorbital structures and periorbital soft tissues are symmetric in appearance without evidence of acute trauma/abnormality, although small amount of emphysema is present near the left globe.   No facial  or nasal bone fracture is identified.   There is complete opacification of the left maxillary sinus with slight expansion of the left ostiomeatal unit. Small amount of layering fluid is present in the right maxillary sinus. There is opacification of scattered ethmoidal air cells.   Evaluation of the oral cavity is somewhat degraded by beam hardening artifact from patient's dental hardware. Within limitations of the study, no acute trauma is identified. Temporomandibular joints are intact.   Soft tissues of the skull base and face do not demonstrate any acute abnormalities.   CT C-SPINE:   There is mild reversal normal lordotic curvature of the cervical spine, with a 2-3 mm anterolisthesis of C3 on C4 and a 4-5 mm anterolisthesis of C4 on C5.   Cervical vertebral body heights are preserved without compression fractures, although some insufficiency endplate changes with Schmorl's nodes are present along the inferior endplate of C5 and C6.   There is no evidence of acute trauma to the posterior elements of the cervical spine. Craniocervical junction is intact. Facet joints are preserved without evidence of traumatic subluxation or perching, although multilevel degenerate facet osteoarthropathy is present, most pronounced at the level of C4-C5.   Multilevel intervertebral disc height loss is present, moderate to severe at C5-C6 and C6-C7.   No high-grade spinal canal stenosis is identified, although at least mild spinal canal narrowing is present at C4-C5, C5-C6 and C6-C7 due to combination of disc osteophyte complexes and spondylolisthesis, with some encroachment of the ventral cervical spinal cord.   Multilevel neural foraminal stenosis is present, worst at the level of C4-C5 with moderate to severe narrowing due to combination of spondylolisthesis and hypertrophic facet changes. Mild-to-moderate neural foraminal stenosis is present at C5-C6 due to hypertrophic uncovertebral and facet joint changes.   Prevertebral and  paraspinal soft tissues do not demonstrate any acute abnormalities. Paraseptal and centrilobular emphysematous changes are present in the lung apices bilaterally.       CT HEAD: 1. No evidence of hemorrhage, skull fracture, or other acute intracranial trauma/abnormality. 2. Small geographic area of cystic encephalomalacia and gliosis is present in the superior right frontal lobe, likely representing sequela of prior infarct/injury. 3. Patchy attenuation changes present in the periventricular and subcortical white matter of bilateral cerebral hemispheres likely represent sequela of microvascular disease.   CT FACIAL BONES: 1. No evidence of acute facial trauma. 2. Completely opacification of the left maxillary sinus with mild expansion of the left ostiomeatal unit. Correlate with symptoms of sinusitis.   CT C-SPINE: 1. No evidence of acute trauma to the cervical spine. 2. Spondylotic changes of the cervical spine as detailed above.   MACRO: None   Signed by: Yefri Horan 12/19/2024 6:28 PM Dictation workstation:   [id number]    CT 3D reconstruction    Result Date: 12/19/2024  Interpreted By:  Yefri Horan, STUDY: CT HEAD WO IV CONTRAST; CT FACIAL BONES WO IV CONTRAST; CT CERVICAL SPINE WO IV CONTRAST; CT 3D RECONSTRUCTION;  12/19/2024 6:11 pm   INDICATION: Signs/Symptoms:Syncope; Signs/Symptoms:syncope; Signs/Symptoms:3D FACIAL BONES.     COMPARISON: CT head dated 07/29/2013..   ACCESSION NUMBER(S): [id number]; [id number]; [id number]; [id number]   ORDERING CLINICIAN: TESS HEREDIA   TECHNIQUE: Noncontrast axial CT scan of head was performed, with coronal and sagittal reformats provided. The images were reviewed in bone, brain, blood and soft tissue windows.   Thin cut axial CT images through the facial bones were obtained and reconstructed in the coronal and sagittal plane. 3D reconstruction of the facial bones was created on a dedicated workstation and provided for  interpretation.   Axial CT images of the cervical spine are obtained. Axial, coronal and sagittal reconstructions are provided for review.   FINDINGS: CT HEAD:   No hyperdense intracranial hemorrhage is identified. There is no mass effect or midline shift present.   Small geographic area of cystic encephalomalacia and gliosis is present in the posterior right frontal lobe, likely representing sequela of prior infarct/injury, new since prior exam in 2013. Patchy and confluent areas of decreased attenuation in the periventricular and subcortical white matter of bilateral cerebral hemispheres likely represent sequela of microvascular disease.   Otherwise, gray-white differentiation is intact, without evidence of acute CT apparent transcortical infarct.   Mild-to-moderate brain parenchymal volume loss is present without abnormal ventricular dilatation. Basal cisterns are patent. No extra-axial fluid collections are identified.   Scalp soft tissues do not demonstrate any acute abnormality. No acute calvarial abnormality is present. There is no evidence of skull fracture.   Mastoid air cells and middle ear cavities are clear.   CT FACIAL BONES:   Bony orbits are intact, without evidence of displaced fracture. Intraorbital structures and periorbital soft tissues are symmetric in appearance without evidence of acute trauma/abnormality, although small amount of emphysema is present near the left globe.   No facial or nasal bone fracture is identified.   There is complete opacification of the left maxillary sinus with slight expansion of the left ostiomeatal unit. Small amount of layering fluid is present in the right maxillary sinus. There is opacification of scattered ethmoidal air cells.   Evaluation of the oral cavity is somewhat degraded by beam hardening artifact from patient's dental hardware. Within limitations of the study, no acute trauma is identified. Temporomandibular joints are intact.   Soft tissues of the  skull base and face do not demonstrate any acute abnormalities.   CT C-SPINE:   There is mild reversal normal lordotic curvature of the cervical spine, with a 2-3 mm anterolisthesis of C3 on C4 and a 4-5 mm anterolisthesis of C4 on C5.   Cervical vertebral body heights are preserved without compression fractures, although some insufficiency endplate changes with Schmorl's nodes are present along the inferior endplate of C5 and C6.   There is no evidence of acute trauma to the posterior elements of the cervical spine. Craniocervical junction is intact. Facet joints are preserved without evidence of traumatic subluxation or perching, although multilevel degenerate facet osteoarthropathy is present, most pronounced at the level of C4-C5.   Multilevel intervertebral disc height loss is present, moderate to severe at C5-C6 and C6-C7.   No high-grade spinal canal stenosis is identified, although at least mild spinal canal narrowing is present at C4-C5, C5-C6 and C6-C7 due to combination of disc osteophyte complexes and spondylolisthesis, with some encroachment of the ventral cervical spinal cord.   Multilevel neural foraminal stenosis is present, worst at the level of C4-C5 with moderate to severe narrowing due to combination of spondylolisthesis and hypertrophic facet changes. Mild-to-moderate neural foraminal stenosis is present at C5-C6 due to hypertrophic uncovertebral and facet joint changes.   Prevertebral and paraspinal soft tissues do not demonstrate any acute abnormalities. Paraseptal and centrilobular emphysematous changes are present in the lung apices bilaterally.       CT HEAD: 1. No evidence of hemorrhage, skull fracture, or other acute intracranial trauma/abnormality. 2. Small geographic area of cystic encephalomalacia and gliosis is present in the superior right frontal lobe, likely representing sequela of prior infarct/injury. 3. Patchy attenuation changes present in the periventricular and subcortical  white matter of bilateral cerebral hemispheres likely represent sequela of microvascular disease.   CT FACIAL BONES: 1. No evidence of acute facial trauma. 2. Completely opacification of the left maxillary sinus with mild expansion of the left ostiomeatal unit. Correlate with symptoms of sinusitis.   CT C-SPINE: 1. No evidence of acute trauma to the cervical spine. 2. Spondylotic changes of the cervical spine as detailed above.   MACRO: None   Signed by: Yefri Horan 12/19/2024 6:28 PM Dictation workstation:   [id number]             Malnutrition Diagnosis Status: New  Malnutrition Diagnosis: Severe malnutrition related to chronic disease or condition  As Evidenced by: moderate/severe subcutaneous fat loss and muscle wasting, non-significant yet undesirable 22# (15.4%) wt loss over ~13 months, po intake </= 75% estimated needs for >/= 1 month  I agree with the dietitian's malnutrition diagnosis.      Assessment/Plan   Assessment & Plan  Syncope    Hypercholesterolemia    Hypertension    Cardiomyopathy    Cardiac defibrillator in place    CAD S/P percutaneous coronary angioplasty    Activation of ICD does not show any event  Orthostatic hypotension is doing better  Dizziness is doing little better  2D echo results pending  Protein supplement added  Discussed care with the family  Encourage patient to quit smoking  Discharge planning after clearance from cardiology       I spent  minutes in the professional and overall care of this patient.      Nayla Thurman MD

## 2024-12-21 NOTE — ASSESSMENT & PLAN NOTE
Advised patient stop smoking.  Continue aspirin 81 mg once a day as for the Farxiga 10 mg once a day.

## 2024-12-21 NOTE — CARE PLAN
Problem: Safety - Adult  Goal: Free from fall injury  Outcome: Progressing  Flowsheets (Taken 12/21/2024 0813)  Free from fall injury: Instruct family/caregiver on patient safety     Problem: Discharge Planning  Goal: Discharge to home or other facility with appropriate resources  Outcome: Progressing  Flowsheets (Taken 12/21/2024 0813)  Discharge to home or other facility with appropriate resources:   Identify barriers to discharge with patient and caregiver   Arrange for needed discharge resources and transportation as appropriate   Identify discharge learning needs (meds, wound care, etc)     Problem: Chronic Conditions and Co-morbidities  Goal: Patient's chronic conditions and co-morbidity symptoms are monitored and maintained or improved  Outcome: Progressing  Flowsheets (Taken 12/21/2024 0813)  Care Plan - Patient's Chronic Conditions and Co-Morbidity Symptoms are Monitored and Maintained or Improved:   Monitor and assess patient's chronic conditions and comorbid symptoms for stability, deterioration, or improvement   Collaborate with multidisciplinary team to address chronic and comorbid conditions and prevent exacerbation or deterioration     Problem: Skin  Goal: Decreased wound size/increased tissue granulation at next dressing change  Outcome: Progressing  Flowsheets (Taken 12/21/2024 0813)  Decreased wound size/increased tissue granulation at next dressing change: Protective dressings over bony prominences  Goal: Participates in plan/prevention/treatment measures  Outcome: Progressing  Flowsheets (Taken 12/21/2024 0813)  Participates in plan/prevention/treatment measures:   Discuss with provider PT/OT consult   Increase activity/out of bed for meals  Goal: Prevent/manage excess moisture  Outcome: Progressing  Flowsheets (Taken 12/21/2024 0813)  Prevent/manage excess moisture: Monitor for/manage infection if present  Goal: Prevent/minimize sheer/friction injuries  Outcome: Progressing  Flowsheets (Taken  12/21/2024 0813)  Prevent/minimize sheer/friction injuries:   Complete micro-shifts as needed if patient unable. Adjust patient position to relieve pressure points, not a full turn   HOB 30 degrees or less   Increase activity/out of bed for meals  Goal: Promote/optimize nutrition  Outcome: Progressing  Flowsheets (Taken 12/21/2024 0813)  Promote/optimize nutrition:   Offer water/supplements/favorite foods   Consume > 50% meals/supplements  Goal: Promote skin healing  Outcome: Progressing  Flowsheets (Taken 12/21/2024 0813)  Promote skin healing:   Assess skin/pad under line(s)/device(s)   Ensure correct size (line/device) and apply per  instructions   Protective dressings over bony prominences     Problem: Nutrition  Goal: Less than 5 days NPO/clear liquids  Outcome: Progressing  Goal: Oral intake greater than 50%  Outcome: Progressing  Goal: Oral intake greater 75%  Outcome: Progressing  Goal: Consume prescribed supplement  Outcome: Progressing  Goal: Adequate PO fluid intake  Outcome: Progressing  Goal: Nutrition support goals are met within 48 hrs  Outcome: Progressing  Goal: Nutrition support is meeting 75% of nutrient needs  Outcome: Progressing  Goal: Tube feed tolerance  Outcome: Progressing  Goal: BG  mg/dL  Outcome: Progressing  Goal: Lab values WNL  Outcome: Progressing  Goal: Electrolytes WNL  Outcome: Progressing  Goal: Promote healing  Outcome: Progressing  Goal: Maintain stable weight  Outcome: Progressing  Goal: Reduce weight from edema/fluid  Outcome: Progressing  Goal: Gradual weight gain  Outcome: Progressing  Goal: Improve ostomy output  Outcome: Progressing   The patient's goals for the shift include rest     The clinical goals for the shift include maintain hjemodynamic stability    Over the shift, the patient did  make progress toward the following goals. Barriers to progression include low blood pressure. Recommendations to address these barriers include fluids.

## 2024-12-21 NOTE — PROGRESS NOTES
Subjective Data:  Patient currently laying in the bed.  Comfortable.  No chest pain or tightness.  Patient did had an episode of fall low blood pressure as well as orthostatic low blood pressure.  Patient had a blood pressure drop systolic almost 20 points.  Pending repeat orthostatic blood pressure.  Patient also continuously smoking.  Also complain of weight loss.  Overnight Events:    None  Objective   Last Recorded Vitals  /61 (BP Location: Right arm, Patient Position: Lying)   Pulse 60   Temp 36.9 °C (98.4 °F) (Temporal)   Resp 18   Wt 58.5 kg (128 lb 15.5 oz)   SpO2 91%   No intake or output data in the 24 hours ending 12/21/24 0808  Physical Exam:  HEENT: Normocephalic/atraumatic pupils equal react light  Neck exam mild JVD, no bruit  Lung exam clear to auscultation, few crackles at the bases  Cardiac exam is regular rhythm S1-S2, soft systolic murmur heard.   Abdomen soft nontender, nondistended  Extremities no clubbing, cyanosis, trace edema  Neuro exam grossly intact.  Image Results  Transthoracic Echo (TTE) Complete              [facility]  [address] [id number]              Phone [phone]    TRANSTHORACIC ECHOCARDIOGRAM REPORT    Patient Name:       ESTEBAN Morrell Physician:    [id number] Nate Machado MD  Study Date:         12/20/2024           Ordering Provider:    [id number] [place] REYES  MRN/PID:            [id number]             Fellow:  Accession#:         [id number]         Nurse:  Date of Birth/Age:  1945 / 79 years Sonographer:          Maggi Rizo RDCS  Gender Assigned at  F                    Additional Staff:  Birth:  Height:             175.26 cm            Admit Date:  Weight:             54.89 kg             Admission Status:      Inpatient -                                                                 Routine  BSA / BMI:          1.67 m2 / 17.87      [facility]                      kg/m2  Blood Pressure: 95 /60 mmHg    Study Type:    TRANSTHORACIC ECHO (TTE) COMPLETE  Diagnosis/ICD: Syncope-R55  Indication:    Syncope  CPT Codes:     Echo Complete w Full [id number]    Patient History:  Pertinent History: HTN, CAD, Cardiomyopathy and CHF. NSTEMI, V-Tach, Syncope,                     Defibrillator.    Study Detail: The following Echo studies were performed: 2D, M-Mode, Doppler and                color flow. Definity used as a contrast agent for endocardial                border definition. Total contrast used for this procedure was 3 mL                via IV push.       PHYSICIAN INTERPRETATION:  Left Ventricle: The left ventricular systolic function is severely decreased, with a visually estimated ejection fraction of 15-20%. Wall motion is abnormal. The left ventricular cavity size is severely dilated. Spectral Doppler shows an abnormal pattern of left ventricular diastolic filling. There is severe global LV hypokinesis with akinesis of the anterosptal wall and LV apex.  Left Atrium: The left atrium is normal in size.  Right Ventricle: The right ventricle is normal in size. There is normal right ventriclar wall thickness. There is normal right ventricular global systolic function. A device is visualized in the right ventricle.  Right Atrium: The right atrium is normal in size. There is a device visualized in the right atrium.  Aortic Valve: The aortic valve is trileaflet. The aortic valve dimensionless index is 0.66. There is no evidence of aortic valve regurgitation. The peak instantaneous gradient of the aortic valve is 6 mmHg. The mean gradient of the aortic valve is 3 mmHg.  Mitral Valve: The mitral valve is normal in structure. There is normal mitral valve leaflet mobility. There is mild mitral annular  calcification. There is mild mitral valve regurgitation.  Tricuspid Valve: The tricuspid valve is structurally normal. There is normal tricuspid valve leaflet mobility. There is trace tricuspid regurgitation.  Pulmonic Valve: The pulmonic valve is structurally normal. There is trace pulmonic valve regurgitation.  Pericardium: No pericardial effusion noted.  Aorta: The aortic root is normal. There is no dilatation of the aortic arch. There is no dilatation of the ascending aorta. There is no dilatation of the aortic root.  Pulmonary Artery: The pulmonary artery is normal in size. The tricuspid regurgitant velocity is 1.60 m/s, and with an estimated right atrial pressure of 3 mmHg, the estimated pulmonary artery pressure is normal with the RVSP at 13.2 mmHg.  Systemic Veins: The inferior vena cava appears normal in size, with IVC inspiratory collapse greater than 50%.       CONCLUSIONS:   1. The left ventricular systolic function is severely decreased, with a visually estimated ejection fraction of 15-20%.   2. Abnormal wall motion.   3. Spectral Doppler shows an abnormal pattern of left ventricular diastolic filling.   4. Left ventricular cavity size is severely dilated.   5. There is severe global LV hypokinesis with akinesis of the anterosptal wall and LV apex.   6. There is normal right ventricular global systolic function.   7. Mild mitral valve regurgitation.    QUANTITATIVE DATA SUMMARY:     2D MEASUREMENTS:            Normal Ranges:  LAs:             2.90 cm    (2.7-4.0cm)  IVSd:            0.72 cm    (0.6-1.1cm)  LVPWd:           1.22 cm    (0.6-1.1cm)  LVIDd:           5.45 cm    (3.9-5.9cm)  LVIDs:           4.94 cm  LV Mass Index:   120.8 g/m2  LV % FS          9.4 %       M-MODE MEASUREMENTS:         Normal Ranges:  Ao Root:             3.00 cm (2.0-3.7cm)       AORTA MEASUREMENTS:         Normal Ranges:  Ao Sinus, d:        3.03 cm (2.1-3.5cm)       LV SYSTOLIC FUNCTION BY 2D PLANIMETRY (MOD):                        Normal Ranges:  EF-A4C View:    23 % (>=55%)  EF-A2C View:    30 %  EF-Biplane:     26 %  EF-Visual:      18 %  LV EF Reported: 18 %       LV DIASTOLIC FUNCTION:          Normal Ranges:  MV lateral e'          0.05 m/s       AORTIC VALVE:                     Normal Ranges:  AoV Vmax:                1.22 m/s (<=1.7m/s)  AoV Peak P.0 mmHg (<20mmHg)  AoV Mean PG:             3.0 mmHg (1.7-11.5mmHg)  LVOT Max [place]:            0.87 m/s (<=1.1m/s)  AoV VTI:                 29.50 cm (18-25cm)  LVOT VTI:                19.40 cm  LVOT Diameter:           2.10 cm  (1.8-2.4cm)  AoV Area, VTI:           2.28 cm2 (2.5-5.5cm2)  AoV Area,Vmax:           2.48 cm2 (2.5-4.5cm2)  AoV Dimensionless Index: 0.66       RIGHT VENTRICLE:  TAPSE: 17.5 mm       TRICUSPID VALVE/RVSP:          Normal Ranges:  Peak TR Velocity:     1.60 m/s  RV Syst Pressure:     13 mmHg  (< 30mmHg)  IVC Diam:             1.42 cm       [id number] Nate Machado MD  Electronically signed on 2024 at 10:37:44 PM       ** Final **  Carotid duplex bilateral  Narrative: Interpreted By:  Lauro Duffy,   STUDY:  [facility] CAROTID ARTERY DUPLEX BILATERAL;  2024 8:05 am      INDICATION:  Signs/Symptoms:syncope.      COMPARISON:  2013.      ACCESSION NUMBER(S):  [id number]      ORDERING CLINICIAN:  TENNILLE LAGOS      TECHNIQUE:  Vascular ultrasound of the extracranial carotid system was performed  bilaterally.  Gray scale, color Doppler and spectral Doppler waveform  analysis was performed.      FINDINGS:  RIGHT:  On the right  mild atherosclerotic plaque is present. Right ICA peak  velocities not significantly elevated. The peak systolic velocities  are as follows:      RIGHT SIDE PEAK SYSTOLIC VELOCITY TABLE:  CCA 63 cm/sec.  ICA 58 cm/sec.  ECA 57 cm/sec.      The ratio of the peak systolic velocity of the right ICA/CCA is 0.9.      RIGHT VERTEBRAL ARTERY:  The right vertebral artery demonstrates proximal normal anterograde  flow       LEFT:  On the left  mild atherosclerotic plaque is present. Left ICA peak  velocities not significantly elevated. The peak systolic velocities  are as follows:      LEFT SIDE PEAK SYSTOLIC VELOCITY TABLE:  CCA 72 cm/sec.  ICA 66 cm/sec.  ECA 66 cm/sec.      The ratio of the peak systolic velocity of the left ICA/CCA is 0.9.      LEFT VERTEBRAL ARTERY:  The left vertebral artery demonstrates proximal normal anterograde  flow      Impression: Estimated ICA stenosis is less than 50% bilaterally.      MACRO:  None      Signed by: Lauro Duffy 12/20/2024 3:56 PM  Dictation workstation:   [id number]  ECG 12 lead  Sinus rhythm with Premature atrial complexes with Aberrant conduction  Biatrial enlargement  Left axis deviation  Inferior infarct , age undetermined  Anterolateral infarct (cited on or before 29-OCT-2013)  Abnormal ECG  When compared with ECG of 14-NOV-2023 16:05,  Significant changes have occurred    Last Labs:  CBC - 12/21/2024:  4:54 AM  8.0 15.5 280    46.6      CMP - 12/21/2024:  4:54 AM  8.8 6.0 22 --- 0.6   _ 3.5 26 74      PTT - No results in last year.  _   _ _     Inpatient Medications:  Scheduled medications   Medication Dose Route Frequency    aspirin  81 mg oral Daily    dapagliflozin propanediol  10 mg oral Daily    docusate sodium  100 mg oral BID    enoxaparin  40 mg subcutaneous Daily    melatonin  3 mg oral Nightly    metoprolol succinate XL  25 mg oral Daily    mexiletine  150 mg oral q8h    polyethylene glycol  17 g oral Daily    pravastatin  20 mg oral Daily    sacubitriL-valsartan  0.5 tablet oral BID     Assessment & Plan  Syncope  79-year-old female patient with a history of cardiomyopathy.  Also history of smoking.  Now with a syncope more likely orthostatic from dehydration.  As well as a currently on diuretics.  Check repeat orthostatic blood pressure.  Must continue half dose of Entresto as well as low-dose of Toprol.  Okay to discharge once patient is negative orthostatic as  well as asymptomatic.  CAD S/P percutaneous coronary angioplasty  Advised patient stop smoking.  Continue aspirin 81 mg once a day as for the Farxiga 10 mg once a day.  Cardiac defibrillator in place  History of ICD placed.  Currently on a GDMT for cardiomyopathy.  Cardiomyopathy  Continue half dose of Entresto as well as Farxiga and Toprol.  Hypertension  Continue half dose of Entresto, Toprol, check orthostatic for blood pressure.  Hypercholesterolemia  Currently Pravachol 20 mg tablet p.o. daily.  Tobacco use disorder  Smoking cessation counseling was performed.  The patient was counseled on the harms of smoking, including increased risk for lung disease, cardiovascular disease and cancer. In  the context of the disease, smoking is associated with a greater pain, worse joint damage, and worse response to biological therapy.  About 11 to 15 minute on smoking cessation and counseling to patient and family.    Code Status:  Full Code  I spent 45 minutes in the professional and overall care of this patient.  Ivan Garcia MD

## 2024-12-21 NOTE — ASSESSMENT & PLAN NOTE
79-year-old female patient with a history of cardiomyopathy.  Also history of smoking.  Now with a syncope more likely orthostatic from dehydration.  As well as a currently on diuretics.  Check repeat orthostatic blood pressure.  Must continue half dose of Entresto as well as low-dose of Toprol.  Okay to discharge once patient is negative orthostatic as well as asymptomatic.

## 2024-12-22 LAB
ATRIAL RATE: 85 BPM
P AXIS: 75 DEGREES
P OFFSET: 184 MS
P ONSET: 131 MS
PR INTERVAL: 168 MS
Q ONSET: 215 MS
QRS COUNT: 14 BEATS
QRS DURATION: 100 MS
QT INTERVAL: 368 MS
QTC CALCULATION(BAZETT): 437 MS
QTC FREDERICIA: 413 MS
R AXIS: -83 DEGREES
T AXIS: 78 DEGREES
T OFFSET: 399 MS
VENTRICULAR RATE: 85 BPM

## 2024-12-22 NOTE — PROGRESS NOTES
"Nevaeh Pfeiffer is a 79 y.o. female on day 2 of admission presenting with Syncope.    Subjective   Patient feeling a lot better today.  Appetite still poor.  Drinking little bit better       Objective     Physical Exam  Vitals reviewed.   Constitutional:       Appearance: Normal appearance.   HENT:      Head: Normocephalic and atraumatic.      Right Ear: Tympanic membrane, ear canal and external ear normal.      Left Ear: Tympanic membrane, ear canal and external ear normal.      Nose: Nose normal.      Mouth/Throat:      Pharynx: Oropharynx is clear.   Eyes:      Extraocular Movements: Extraocular movements intact.      Conjunctiva/sclera: Conjunctivae normal.      Pupils: Pupils are equal, round, and reactive to light.   Cardiovascular:      Rate and Rhythm: Normal rate and regular rhythm.      Pulses: Normal pulses.      Heart sounds: Normal heart sounds.   Pulmonary:      Effort: Pulmonary effort is normal.      Breath sounds: Normal breath sounds.   Abdominal:      General: Abdomen is flat. Bowel sounds are normal.      Palpations: Abdomen is soft.   Musculoskeletal:      Cervical back: Normal range of motion and neck supple.   Skin:     General: Skin is warm and dry.   Neurological:      General: No focal deficit present.      Mental Status: She is alert and oriented to person, place, and time.   Psychiatric:         Mood and Affect: Mood normal.         Last Recorded Vitals  Blood pressure 88/76, pulse 61, temperature 36.7 °C (98.1 °F), temperature source Temporal, resp. rate 18, height 1.753 m (5' 9.02\"), weight 58.5 kg (128 lb 15.5 oz), SpO2 98%.  Intake/Output last 3 Shifts:  No intake/output data recorded.    Relevant Results               Scheduled medications      Continuous medications    PRN medications    Results for orders placed or performed during the hospital encounter of 12/19/24 (from the past 24 hours)   CBC   Result Value Ref Range    WBC 8.0 4.4 - 11.3 x10*3/uL    nRBC 0.0 0.0 - 0.0 /100 WBCs "    RBC 5.12 4.00 - 5.20 x10*6/uL    Hemoglobin 15.5 12.0 - 16.0 g/dL    Hematocrit 46.6 (H) 36.0 - 46.0 %    MCV 91 80 - 100 fL    MCH 30.3 26.0 - 34.0 pg    MCHC 33.3 32.0 - 36.0 g/dL    RDW 13.4 11.5 - 14.5 %    Platelets 280 150 - 450 x10*3/uL   Basic Metabolic Panel   Result Value Ref Range    Glucose 92 74 - 99 mg/dL    Sodium 137 136 - 145 mmol/L    Potassium 4.5 3.5 - 5.3 mmol/L    Chloride 104 98 - 107 mmol/L    Bicarbonate 27 21 - 32 mmol/L    Anion Gap 11 10 - 20 mmol/L    Urea Nitrogen 17 6 - 23 mg/dL    Creatinine 0.77 0.50 - 1.05 mg/dL    eGFR 79 >60 mL/min/1.73m*2    Calcium 8.8 8.6 - 10.3 mg/dL     Transthoracic Echo (TTE) Complete    Result Date: 12/20/2024            [place] [address]             Phone [phone] TRANSTHORACIC ECHOCARDIOGRAM REPORT Patient Name:       ESTEBAN Morrell Physician:    [id number] Nate Machado MD Study Date:         12/20/2024           Ordering Provider:    [id number] [place] REYES MRN/PID:            [id number]             Fellow: Accession#:         [id number]         Nurse: Date of Birth/Age:  1945 / 79 years Sonographer:          Maggi Rizo RDCS Gender Assigned at  F                    Additional Staff: Birth: Height:             175.26 cm            Admit Date: Weight:             54.89 kg             Admission Status:     Inpatient -                                                                Routine BSA / BMI:          1.67 m2 / 17.87      Department Location:  [facility]                     kg/m2 Blood Pressure: 95 /60 mmHg Study Type:    TRANSTHORACIC ECHO (TTE) COMPLETE Diagnosis/ICD: Syncope-R55 Indication:    Syncope CPT Codes:     Echo Complete w Full [id number] Patient History: Pertinent History:  HTN, CAD, Cardiomyopathy and CHF. NSTEMI, V-Tach, Syncope,                    Defibrillator. Study Detail: The following Echo studies were performed: 2D, M-Mode, Doppler and               color flow. Definity used as a contrast agent for endocardial               border definition. Total contrast used for this procedure was 3 mL               via IV push.  PHYSICIAN INTERPRETATION: Left Ventricle: The left ventricular systolic function is severely decreased, with a visually estimated ejection fraction of 15-20%. Wall motion is abnormal. The left ventricular cavity size is severely dilated. Spectral Doppler shows an abnormal pattern of left ventricular diastolic filling. There is severe global LV hypokinesis with akinesis of the anterosptal wall and LV apex. Left Atrium: The left atrium is normal in size. Right Ventricle: The right ventricle is normal in size. There is normal right ventriclar wall thickness. There is normal right ventricular global systolic function. A device is visualized in the right ventricle. Right Atrium: The right atrium is normal in size. There is a device visualized in the right atrium. Aortic Valve: The aortic valve is trileaflet. The aortic valve dimensionless index is 0.66. There is no evidence of aortic valve regurgitation. The peak instantaneous gradient of the aortic valve is 6 mmHg. The mean gradient of the aortic valve is 3 mmHg. Mitral Valve: The mitral valve is normal in structure. There is normal mitral valve leaflet mobility. There is mild mitral annular calcification. There is mild mitral valve regurgitation. Tricuspid Valve: The tricuspid valve is structurally normal. There is normal tricuspid valve leaflet mobility. There is trace tricuspid regurgitation. Pulmonic Valve: The pulmonic valve is structurally normal. There is trace pulmonic valve regurgitation. Pericardium: No pericardial effusion noted. Aorta: The aortic root is normal. There is no dilatation of the aortic arch.  There is no dilatation of the ascending aorta. There is no dilatation of the aortic root. Pulmonary Artery: The pulmonary artery is normal in size. The tricuspid regurgitant velocity is 1.60 m/s, and with an estimated right atrial pressure of 3 mmHg, the estimated pulmonary artery pressure is normal with the RVSP at 13.2 mmHg. Systemic Veins: The inferior vena cava appears normal in size, with IVC inspiratory collapse greater than 50%.  CONCLUSIONS:  1. The left ventricular systolic function is severely decreased, with a visually estimated ejection fraction of 15-20%.  2. Abnormal wall motion.  3. Spectral Doppler shows an abnormal pattern of left ventricular diastolic filling.  4. Left ventricular cavity size is severely dilated.  5. There is severe global LV hypokinesis with akinesis of the anterosptal wall and LV apex.  6. There is normal right ventricular global systolic function.  7. Mild mitral valve regurgitation. QUANTITATIVE DATA SUMMARY:  2D MEASUREMENTS:            Normal Ranges: LAs:             2.90 cm    (2.7-4.0cm) IVSd:            0.72 cm    (0.6-1.1cm) LVPWd:           1.22 cm    (0.6-1.1cm) LVIDd:           5.45 cm    (3.9-5.9cm) LVIDs:           4.94 cm LV Mass Index:   120.8 g/m2 LV % FS          9.4 %  M-MODE MEASUREMENTS:         Normal Ranges: Ao Root:             3.00 cm (2.0-3.7cm)  AORTA MEASUREMENTS:         Normal Ranges: Ao Sinus, d:        3.03 cm (2.1-3.5cm)  LV SYSTOLIC FUNCTION BY 2D PLANIMETRY (MOD):                      Normal Ranges: EF-A4C View:    23 % (>=55%) EF-A2C View:    30 % EF-Biplane:     26 % EF-Visual:      18 % LV EF Reported: 18 %  LV DIASTOLIC FUNCTION:          Normal Ranges: MV lateral e'          0.05 m/s  AORTIC VALVE:                     Normal Ranges: AoV Vmax:                1.22 m/s (<=1.7m/s) AoV Peak P.0 mmHg (<20mmHg) AoV Mean PG:             3.0 mmHg (1.7-11.5mmHg) LVOT Max [place]:            0.87 m/s (<=1.1m/s) AoV VTI:                  29.50 cm (18-25cm) LVOT VTI:                19.40 cm LVOT Diameter:           2.10 cm  (1.8-2.4cm) AoV Area, VTI:           2.28 cm2 (2.5-5.5cm2) AoV Area,Vmax:           2.48 cm2 (2.5-4.5cm2) AoV Dimensionless Index: 0.66  RIGHT VENTRICLE: TAPSE: 17.5 mm  TRICUSPID VALVE/RVSP:          Normal Ranges: Peak TR Velocity:     1.60 m/s RV Syst Pressure:     13 mmHg  (< 30mmHg) IVC Diam:             1.42 cm  [id number] Nate Machado MD Electronically signed on 12/20/2024 at 10:37:44 PM  ** Final **     Carotid duplex bilateral    Result Date: 12/20/2024  Interpreted By:  Lauro Duffy, STUDY: [facility] US CAROTID ARTERY DUPLEX BILATERAL;  12/20/2024 8:05 am   INDICATION: Signs/Symptoms:syncope.   COMPARISON: 07/30/2013.   ACCESSION NUMBER(S): [id number]   ORDERING CLINICIAN: TENNILLE LAGOS   TECHNIQUE: Vascular ultrasound of the extracranial carotid system was performed bilaterally.  Gray scale, color Doppler and spectral Doppler waveform analysis was performed.   FINDINGS: RIGHT: On the right  mild atherosclerotic plaque is present. Right ICA peak velocities not significantly elevated. The peak systolic velocities are as follows:   RIGHT SIDE PEAK SYSTOLIC VELOCITY TABLE: CCA 63 cm/sec. ICA 58 cm/sec. ECA 57 cm/sec.   The ratio of the peak systolic velocity of the right ICA/CCA is 0.9.   RIGHT VERTEBRAL ARTERY: The right vertebral artery demonstrates proximal normal anterograde flow   LEFT: On the left  mild atherosclerotic plaque is present. Left ICA peak velocities not significantly elevated. The peak systolic velocities are as follows:   LEFT SIDE PEAK SYSTOLIC VELOCITY TABLE: CCA 72 cm/sec. ICA 66 cm/sec. ECA 66 cm/sec.   The ratio of the peak systolic velocity of the left ICA/CCA is 0.9.   LEFT VERTEBRAL ARTERY: The left vertebral artery demonstrates proximal normal anterograde flow       Estimated ICA stenosis is less than 50% bilaterally.   MACRO: None   Signed by: Lauro Duffy 12/20/2024 3:56 PM Dictation  workstation:   [id number]              Malnutrition Diagnosis Status: New  Malnutrition Diagnosis: Severe malnutrition related to chronic disease or condition  As Evidenced by: moderate/severe subcutaneous fat loss and muscle wasting, non-significant yet undesirable 22# (15.4%) wt loss over ~13 months, po intake </= 75% estimated needs for >/= 1 month  I agree with the dietitian's malnutrition diagnosis.      Assessment/Plan   Assessment & Plan  Syncope    Hypercholesterolemia    Hypertension    Cardiomyopathy    Cardiac defibrillator in place    CAD S/P percutaneous coronary angioplasty    Tobacco use disorder    Interrogation of ICD does not show any event  Orthostatic hypotension is doing better  Dizziness is doing little better  2D echo results still with ejection fraction of 15 to 20%  Protein supplement added  Discussed care with the family  Encourage patient to quit smoking  Encourage patient to eat and drink better  Cardiology has cleared patient for discharge with adjustment of dose of Entresto and metoprolol       I spent  minutes in the professional and overall care of this patient.      Nayla Thurman MD

## 2024-12-22 NOTE — DISCHARGE SUMMARY
Discharge Diagnosis  Syncope    Issues Requiring Follow-Up  Ischemic cardiomyopathy  Reduced ejection fraction in 20   orthostatic hypotension    Test Results Pending At Discharge  Pending Labs       Order Current Status    Urinalysis with Reflex Microscopic Collected (12/19/24 0117)            Hospital Course   Nevaeh Pfeiffer is a 79 y.o. female presenting with syncopal episode.  Patient has history of cardiac defibrillator.   who was with the patient states around 10 11 AM patient did not feel good felt dizzy and turned around and straight went down on the ground.  Patient has been dealing with flulike symptoms for past few days  Past medical history: MI, pacemaker/defibrillator, ejection fraction 20%, AAA 4.6 x 3.8,  Occupation: Retired volunteer at school  Social history: Smokes denies drinking denies drug  Patient found to be orthostatic hypotension.  Treated with gentle IV fluids.  Metoprolol dose decreased.  Entresto dose reduced to half.  Patient has very poor appetite.  Encouraged to eat and drink better, limits.  Advise follow-up with cardiology and PCP next 1 to 2 weeks      Pertinent Physical Exam At Time of Discharge  Physical Exam  Vitals reviewed.   Constitutional:       Appearance: Normal appearance.   HENT:      Head: Normocephalic and atraumatic.      Right Ear: Tympanic membrane, ear canal and external ear normal.      Left Ear: Tympanic membrane, ear canal and external ear normal.      Nose: Nose normal.      Mouth/Throat:      Pharynx: Oropharynx is clear.   Eyes:      Extraocular Movements: Extraocular movements intact.      Conjunctiva/sclera: Conjunctivae normal.      Pupils: Pupils are equal, round, and reactive to light.   Cardiovascular:      Rate and Rhythm: Normal rate and regular rhythm.      Pulses: Normal pulses.      Heart sounds: Normal heart sounds.   Pulmonary:      Effort: Pulmonary effort is normal.      Breath sounds: Normal breath sounds.   Abdominal:      General:  Abdomen is flat. Bowel sounds are normal.      Palpations: Abdomen is soft.   Musculoskeletal:      Cervical back: Normal range of motion and neck supple.   Skin:     General: Skin is warm and dry.   Neurological:      General: No focal deficit present.      Mental Status: She is alert and oriented to person, place, and time.   Psychiatric:         Mood and Affect: Mood normal.         Home Medications     Medication List      CHANGE how you take these medications     metoprolol succinate XL 25 mg 24 hr tablet; Commonly known as:   Toprol-XL; Take 1 tablet (25 mg) by mouth once daily. Do not crush or   chew.; Start taking on: December 22, 2024; What changed: medication   strength, how much to take, additional instructions   pravastatin 20 mg tablet; Commonly known as: Pravachol; Take 1 tablet by   mouth once daily; What changed: Another medication with the same name was   removed. Continue taking this medication, and follow the directions you   see here.   sacubitriL-valsartan 24-26 mg tablet; Commonly known as: Entresto; Take   0.5 tablets by mouth 2 times a day.; What changed: how much to take     CONTINUE taking these medications     aspirin 81 mg EC tablet   coenzyme Q-10 100 mg capsule   Farxiga 10 mg; Generic drug: dapagliflozin propanediol; Take 1 tablet   (10 mg) by mouth once daily.   mexiletine 150 mg capsule; Commonly known as: Mexitil; Take 1 capsule   (150 mg) by mouth every 8 hours.   VITAMIN D3 ORAL       Outpatient Follow-Up  Future Appointments   Date Time Provider Department Center   12/31/2024  9:00 AM [facility] [id number] [place] [id number] [facility] [place] [facility]   12/31/2024 10:00 AM [facility] ROOM [id number] [facility]   1/14/2025  2:00 PM Amos Moscoso MD [id number] [facility]   2/11/2025  4:00 PM Marissa Ojeda MD [id number] [facility]       Nayla Thurman MD

## 2024-12-23 ENCOUNTER — HOSPITAL ENCOUNTER (OUTPATIENT)
Dept: CARDIOLOGY | Facility: CLINIC | Age: 79
Discharge: HOME | End: 2024-12-23
Payer: MEDICARE

## 2024-12-23 DIAGNOSIS — I42.5 OTHER RESTRICTIVE CARDIOMYOPATHY: ICD-10-CM

## 2024-12-23 DIAGNOSIS — Z95.810 PRESENCE OF AUTOMATIC (IMPLANTABLE) CARDIAC DEFIBRILLATOR: ICD-10-CM

## 2024-12-31 ENCOUNTER — APPOINTMENT (OUTPATIENT)
Dept: RADIOLOGY | Facility: CLINIC | Age: 79
End: 2024-12-31
Payer: MEDICARE

## 2024-12-31 ENCOUNTER — HOSPITAL ENCOUNTER (OUTPATIENT)
Dept: RADIOLOGY | Facility: CLINIC | Age: 79
Discharge: HOME | End: 2024-12-31
Payer: MEDICARE

## 2024-12-31 ENCOUNTER — HOSPITAL ENCOUNTER (OUTPATIENT)
Dept: VASCULAR MEDICINE | Facility: CLINIC | Age: 79
Discharge: HOME | End: 2024-12-31
Payer: MEDICARE

## 2024-12-31 DIAGNOSIS — I10 ESSENTIAL (PRIMARY) HYPERTENSION: ICD-10-CM

## 2024-12-31 DIAGNOSIS — I73.9 CLAUDICATION (CMS-HCC): ICD-10-CM

## 2024-12-31 DIAGNOSIS — Z86.79 HISTORY OF ABDOMINAL AORTIC ANEURYSM (AAA): ICD-10-CM

## 2024-12-31 PROCEDURE — 93979 VASCULAR STUDY: CPT | Performed by: STUDENT IN AN ORGANIZED HEALTH CARE EDUCATION/TRAINING PROGRAM

## 2024-12-31 PROCEDURE — 93923 UPR/LXTR ART STDY 3+ LVLS: CPT | Performed by: INTERNAL MEDICINE

## 2024-12-31 PROCEDURE — 93923 UPR/LXTR ART STDY 3+ LVLS: CPT

## 2024-12-31 PROCEDURE — 93978 VASCULAR STUDY: CPT

## 2025-01-03 DIAGNOSIS — I50.22 CHRONIC SYSTOLIC HEART FAILURE: Primary | ICD-10-CM

## 2025-01-14 ENCOUNTER — APPOINTMENT (OUTPATIENT)
Dept: CARDIOLOGY | Facility: CLINIC | Age: 80
End: 2025-01-14
Payer: MEDICARE

## 2025-01-14 VITALS
SYSTOLIC BLOOD PRESSURE: 98 MMHG | DIASTOLIC BLOOD PRESSURE: 52 MMHG | OXYGEN SATURATION: 96 % | WEIGHT: 127 LBS | HEART RATE: 88 BPM | BODY MASS INDEX: 18.75 KG/M2

## 2025-01-14 DIAGNOSIS — I25.10 CAD S/P PERCUTANEOUS CORONARY ANGIOPLASTY: ICD-10-CM

## 2025-01-14 DIAGNOSIS — Z86.79 HISTORY OF SUSTAINED VENTRICULAR TACHYCARDIA: ICD-10-CM

## 2025-01-14 DIAGNOSIS — I71.40 ABDOMINAL AORTIC ANEURYSM (AAA), UNSPECIFIED PART, UNSPECIFIED WHETHER RUPTURED (CMS-HCC): Primary | ICD-10-CM

## 2025-01-14 DIAGNOSIS — I50.22 CHRONIC SYSTOLIC HEART FAILURE: ICD-10-CM

## 2025-01-14 DIAGNOSIS — I47.20 VENTRICULAR TACHYCARDIA (MULTI): ICD-10-CM

## 2025-01-14 DIAGNOSIS — I25.118 ATHEROSCLEROTIC HEART DISEASE OF NATIVE CORONARY ARTERY WITH OTHER FORMS OF ANGINA PECTORIS: ICD-10-CM

## 2025-01-14 DIAGNOSIS — E78.2 MIXED HYPERLIPIDEMIA: ICD-10-CM

## 2025-01-14 DIAGNOSIS — Z98.61 CAD S/P PERCUTANEOUS CORONARY ANGIOPLASTY: ICD-10-CM

## 2025-01-14 DIAGNOSIS — I10 BENIGN ESSENTIAL HYPERTENSION: ICD-10-CM

## 2025-01-14 DIAGNOSIS — I10 PRIMARY HYPERTENSION: ICD-10-CM

## 2025-01-14 DIAGNOSIS — E78.00 HYPERCHOLESTEROLEMIA: ICD-10-CM

## 2025-01-14 DIAGNOSIS — I42.0 DILATED CARDIOMYOPATHY (MULTI): ICD-10-CM

## 2025-01-14 DIAGNOSIS — I25.10 CORONARY ARTERY DISEASE INVOLVING NATIVE CORONARY ARTERY OF NATIVE HEART WITHOUT ANGINA PECTORIS: ICD-10-CM

## 2025-01-14 DIAGNOSIS — Z95.810 CARDIAC DEFIBRILLATOR IN PLACE: ICD-10-CM

## 2025-01-14 PROCEDURE — 3078F DIAST BP <80 MM HG: CPT | Performed by: INTERNAL MEDICINE

## 2025-01-14 PROCEDURE — 1160F RVW MEDS BY RX/DR IN RCRD: CPT | Performed by: INTERNAL MEDICINE

## 2025-01-14 PROCEDURE — 1159F MED LIST DOCD IN RCRD: CPT | Performed by: INTERNAL MEDICINE

## 2025-01-14 PROCEDURE — 1111F DSCHRG MED/CURRENT MED MERGE: CPT | Performed by: INTERNAL MEDICINE

## 2025-01-14 PROCEDURE — 99214 OFFICE O/P EST MOD 30 MIN: CPT | Performed by: INTERNAL MEDICINE

## 2025-01-14 PROCEDURE — 1125F AMNT PAIN NOTED PAIN PRSNT: CPT | Performed by: INTERNAL MEDICINE

## 2025-01-14 PROCEDURE — 3074F SYST BP LT 130 MM HG: CPT | Performed by: INTERNAL MEDICINE

## 2025-01-14 ASSESSMENT — PAIN SCALES - GENERAL: PAINLEVEL_OUTOF10: 7

## 2025-01-14 ASSESSMENT — ENCOUNTER SYMPTOMS
OCCASIONAL FEELINGS OF UNSTEADINESS: 0
DEPRESSION: 0
LOSS OF SENSATION IN FEET: 0

## 2025-01-14 NOTE — PATIENT INSTRUCTIONS
May hold Entresto for the next 2 to 3 days and tell the upper respiratory infection is better.    May hold Farxiga for 2 to 3 days also for the same reason and then restart after.

## 2025-01-14 NOTE — PROGRESS NOTES
History of present illness:  Patient follow-up history of heart failure systolic dysfunction ejection fraction of 20% status post ICD for primary and secondary prevention on mexiletine follows up with Dr. Tripathi. History of ischemic cardiomyopathy. Last 2 D echo this year showed ejection fraction of 20%. Patient underwent percutaneous coronary intervention to chronically occluded LAD In June 2022.  Patient is NYHA class II-III.  Denies having chest pain.  She does report shortness of breath with mild activity NYHA class III.  Still recovering from upper respiratory infection.    Past Medical History:   Diagnosis Date    Abdominal aortic aneurysm (AAA) without rupture (CMS-HCC) 11/12/2023    Atherosclerotic heart disease of native coronary artery with other forms of angina pectoris 11/12/2023    Benign essential hypertension 11/12/2023    CAD S/P percutaneous coronary angioplasty 11/12/2023    Cardiac defibrillator in place 11/12/2023    Chronic systolic heart failure 11/12/2023    Intermittent claudication (CMS-HCC) 11/12/2023    Shortness of breath 11/12/2023    Sustained ventricular tachycardia (Multi) 11/12/2023    Tobacco use disorder 12/21/2024    Ventricular fibrillation (Multi) 11/12/2023       Past Surgical History:   Procedure Laterality Date    CARDIAC DEFIBRILLATOR PLACEMENT      CHOLECYSTECTOMY      CORONARY ANGIOPLASTY WITH STENT PLACEMENT      HYSTERECTOMY      PACEMAKER PLACEMENT         Allergies   Allergen Reactions    Lisinopril Cough    Other Swelling     PCN  TONGUE SWELLING    Penicillins Unknown    Statins-Hmg-Coa Reductase Inhibitors Myalgia    Warfarin Unknown     Blood Blisters        reports that she has been smoking cigarettes. She started smoking about 61 years ago. She has a 30.5 pack-year smoking history. She has never been exposed to tobacco smoke. She has never used smokeless tobacco. She reports that she does not currently use alcohol. She reports that she does not use  drugs.    Family History   Problem Relation Name Age of Onset    Cancer Mother  61    Heart attack Father  67       Patient's Medications   New Prescriptions    No medications on file   Previous Medications    ASPIRIN 81 MG EC TABLET    Take 1 tablet (81 mg) by mouth once daily.    CHOLECALCIFEROL, VITAMIN D3, (VITAMIN D3 ORAL)    Take 1,000 Units by mouth once daily.    COENZYME Q-10 100 MG CAPSULE    Take by mouth.    FARXIGA 10 MG    Take 1 tablet (10 mg) by mouth once daily.    METOPROLOL SUCCINATE XL (TOPROL-XL) 25 MG 24 HR TABLET    Take 1 tablet (25 mg) by mouth once daily. Do not crush or chew.    MEXILETINE (MEXITIL) 150 MG CAPSULE    Take 1 capsule (150 mg) by mouth every 8 hours.    PRAVASTATIN (PRAVACHOL) 20 MG TABLET    Take 1 tablet by mouth once daily    SACUBITRIL-VALSARTAN (ENTRESTO) 24-26 MG TABLET    Take 0.5 tablets by mouth 2 times a day.   Modified Medications    No medications on file   Discontinued Medications    No medications on file       Objective   Physical Exam  General: Patient in no acute distress   HEENT: Atraumatic normocephalic.  Neck: Supple, jugular venous pressure within normal limit.  No bruits  Lungs: Clear to auscultation bilaterally  Cardiovascular: Regular rate and rhythm, normal heart sounds, no murmurs rubs or gallops  Abdomen: Soft nontender nondistended.  Normal bowel sounds.  Extremities: Warm to touch, no edema.      Lab Review   Admission on 12/19/2024, Discharged on 12/21/2024   Component Date Value    Ventricular Rate 12/19/2024 85     Atrial Rate 12/19/2024 85     NM Interval 12/19/2024 168     QRS Duration 12/19/2024 100     QT Interval 12/19/2024 368     QTC Calculation(Bazett) 12/19/2024 437     P Axis 12/19/2024 75     R Axis 12/19/2024 -83     T Axis 12/19/2024 78     QRS Count 12/19/2024 14     Q Onset 12/19/2024 215     P Onset 12/19/2024 131     P Offset 12/19/2024 184     T Offset 12/19/2024 399     QTC Fredericia 12/19/2024 413     WBC 12/19/2024 9.4      nRBC 12/19/2024 0.0     RBC 12/19/2024 5.94 (H)     Hemoglobin 12/19/2024 18.1 (H)     Hematocrit 12/19/2024 54.0 (H)     MCV 12/19/2024 91     MCH 12/19/2024 30.5     MCHC 12/19/2024 33.5     RDW 12/19/2024 13.8     Platelets 12/19/2024 363     Neutrophils % 12/19/2024 67.3     Immature Granulocytes %,* 12/19/2024 0.2     Lymphocytes % 12/19/2024 19.1     Monocytes % 12/19/2024 7.7     Eosinophils % 12/19/2024 4.7     Basophils % 12/19/2024 1.0     Neutrophils Absolute 12/19/2024 6.29 (H)     Immature Granulocytes Ab* 12/19/2024 0.02     Lymphocytes Absolute 12/19/2024 1.79     Monocytes Absolute 12/19/2024 0.72     Eosinophils Absolute 12/19/2024 0.44 (H)     Basophils Absolute 12/19/2024 0.09     Glucose 12/19/2024 110 (H)     Sodium 12/19/2024 137     Potassium 12/19/2024 3.8     Chloride 12/19/2024 102     Bicarbonate 12/19/2024 24     Anion Gap 12/19/2024 15     Urea Nitrogen 12/19/2024 18     Creatinine 12/19/2024 0.83     eGFR 12/19/2024 72     Calcium 12/19/2024 9.9     Albumin 12/19/2024 4.3     Alkaline Phosphatase 12/19/2024 91     Total Protein 12/19/2024 7.6     AST 12/19/2024 34     Bilirubin, Total 12/19/2024 0.8     ALT 12/19/2024 40     Troponin I, High Sensiti* 12/19/2024 9     Troponin I, High Sensiti* 12/19/2024 9     Flu A Result 12/19/2024 Not Detected     Flu B Result 12/19/2024 Not Detected     Coronavirus 2019, PCR 12/19/2024 Not Detected     D-Dimer Non VTE, Quant (* 12/19/2024 3.84 (H)     WBC 12/20/2024 8.5     nRBC 12/20/2024 0.0     RBC 12/20/2024 5.35 (H)     Hemoglobin 12/20/2024 16.2 (H)     Hematocrit 12/20/2024 51.0 (H)     MCV 12/20/2024 95     MCH 12/20/2024 30.3     MCHC 12/20/2024 31.8 (L)     RDW 12/20/2024 13.7     Platelets 12/20/2024 271     Glucose 12/20/2024 134 (H)     Sodium 12/20/2024 135 (L)     Potassium 12/20/2024 4.2     Chloride 12/20/2024 106     Bicarbonate 12/20/2024 20 (L)     Anion Gap 12/20/2024 13     Urea Nitrogen 12/20/2024 17     Creatinine 12/20/2024  0.68     eGFR 12/20/2024 89     Calcium 12/20/2024 8.6     Albumin 12/20/2024 3.5     Alkaline Phosphatase 12/20/2024 74     Total Protein 12/20/2024 6.0 (L)     AST 12/20/2024 22     Bilirubin, Total 12/20/2024 0.6     ALT 12/20/2024 26     AV pk dontae 12/20/2024 1.22     LVOT diam 12/20/2024 2.10     AV mn grad 12/20/2024 3     Tricuspid annular plane * 12/20/2024 1.8     LV Biplane EF 12/20/2024 26     LV EF 12/20/2024 18     RVSP 12/20/2024 13.2     LVIDd 12/20/2024 5.45     AV pk grad 12/20/2024 6     Aortic Valve Area by Con* 12/20/2024 2.28     Aortic Valve Area by Con* 12/20/2024 2.48     LV A4C EF 12/20/2024 22.6     WBC 12/21/2024 8.0     nRBC 12/21/2024 0.0     RBC 12/21/2024 5.12     Hemoglobin 12/21/2024 15.5     Hematocrit 12/21/2024 46.6 (H)     MCV 12/21/2024 91     MCH 12/21/2024 30.3     MCHC 12/21/2024 33.3     RDW 12/21/2024 13.4     Platelets 12/21/2024 280     Glucose 12/21/2024 92     Sodium 12/21/2024 137     Potassium 12/21/2024 4.5     Chloride 12/21/2024 104     Bicarbonate 12/21/2024 27     Anion Gap 12/21/2024 11     Urea Nitrogen 12/21/2024 17     Creatinine 12/21/2024 0.77     eGFR 12/21/2024 79     Calcium 12/21/2024 8.8         Assessment/Plan   Patient Active Problem List   Diagnosis    Abdominal aortic aneurysm (AAA) (CMS-HCC)    Benign essential hypertension    CAD S/P percutaneous coronary angioplasty    Cardiac defibrillator in place    Cardiomyopathy    Chronic systolic heart failure    Atherosclerotic heart disease of native coronary artery with other forms of angina pectoris    Coronary artery disease involving native coronary artery of native heart without angina pectoris    Current smoker    Gastroesophageal reflux disease    Hyperglycemia    Hypertension    Ankle injury, left, initial encounter    Fracture of foot, left, closed, initial encounter    Fracture of foot    Intermittent claudication (CMS-HCC)    Ischemic myocardial dysfunction    Hypercholesterolemia    Arthritis     Rotator cuff tendinitis    Sciatica    Shortness of breath    Ventricular tachycardia (Multi)    Tick bite    Vasovagal syncope    Ventricular fibrillation (Multi)    Vertigo    Influenza    History of sustained ventricular tachycardia    Non-ST elevation (NSTEMI) myocardial infarction (Multi)    Fall, initial encounter    Syncope    Tobacco use disorder      Patient follow-up history of heart failure systolic dysfunction ejection fraction of 20% status post ICD for primary and secondary prevention on mexiletine follows up with Dr. Tripathi. History of ischemic cardiomyopathy. Last 2 D echo this year showed ejection fraction of 20%. Patient underwent percutaneous coronary intervention to chronically occluded LAD In June 2022.  Patient is NYHA class II-III.  Denies having chest pain.  She does report shortness of breath with mild activity NYHA class III.  Still recovering from upper respiratory infection.  She has been losing weight. Had ultrasound of her abdomen her abdominal aortic aneurysm increased to 4.5 cm from 4 cm.  Following up with vascular surgery.  Denies any abdominal pain.  Peripheral vascular evaluation showed distal disease.  Still complaining of shortness of breath with minimal activity NYHA class III.      We will cut back on her antihypertensive medications for now until she recovers from her upper respiratory infection since blood pressure is too low.  Will restart in 3 to 4 days.  Follow-up in 6 weeks.  Offered her evaluation for advanced heart therapy she would like to hold for now.  Discussed with her lifestyle modification.        Amos Moscoso MD

## 2025-01-17 ENCOUNTER — APPOINTMENT (OUTPATIENT)
Dept: VASCULAR SURGERY | Facility: HOSPITAL | Age: 80
End: 2025-01-17
Payer: MEDICARE

## 2025-01-24 ENCOUNTER — OFFICE VISIT (OUTPATIENT)
Dept: PRIMARY CARE | Facility: CLINIC | Age: 80
End: 2025-01-24
Payer: MEDICARE

## 2025-01-24 DIAGNOSIS — I73.9 PERIPHERAL ARTERIAL DISEASE (CMS-HCC): Primary | ICD-10-CM

## 2025-01-24 DIAGNOSIS — I10 PRIMARY HYPERTENSION: ICD-10-CM

## 2025-01-24 DIAGNOSIS — R63.0 POOR APPETITE: ICD-10-CM

## 2025-01-24 DIAGNOSIS — E78.00 ELEVATED CHOLESTEROL: ICD-10-CM
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
## 2025-01-24 DIAGNOSIS — R05.2 SUBACUTE COUGH: ICD-10-CM

## 2025-01-24 DIAGNOSIS — R73.9 ELEVATED BLOOD SUGAR: ICD-10-CM

## 2025-01-24 DIAGNOSIS — E78.2 MIXED HYPERLIPIDEMIA: ICD-10-CM

## 2025-01-24 PROCEDURE — 99214 OFFICE O/P EST MOD 30 MIN: CPT | Performed by: INTERNAL MEDICINE

## 2025-01-24 RX ORDER — PANTOPRAZOLE SODIUM 40 MG/1
40 TABLET, DELAYED RELEASE ORAL DAILY
Qty: 60 TABLET | Refills: 2 | Status: SHIPPED | OUTPATIENT
Start: 2025-01-24

## 2025-01-24 RX ORDER — SACUBITRIL AND VALSARTAN 24; 26 MG/1; MG/1
0.5 TABLET, FILM COATED ORAL 2 TIMES DAILY
Qty: 90 TABLET | Refills: 0 | Status: SHIPPED | OUTPATIENT
Start: 2025-01-24

## 2025-01-24 RX ORDER — PRAVASTATIN SODIUM 20 MG/1
20 TABLET ORAL DAILY
Qty: 90 TABLET | Refills: 0 | Status: SHIPPED | OUTPATIENT
Start: 2025-01-24

## 2025-01-24 RX ORDER — METOPROLOL SUCCINATE 25 MG/1
25 TABLET, EXTENDED RELEASE ORAL DAILY
Qty: 90 TABLET | Refills: 1 | Status: SHIPPED | OUTPATIENT
Start: 2025-01-24

## 2025-01-24 NOTE — PROGRESS NOTES
Subjective   Patient ID: Nevaeh Pfeiffer is a 79 y.o. female who presents for Follow-up and Med Refill.    HPI   Patient is here for follow-up  Still coughing no energy legs feel weak increasing shortness of breath lost 10 pounds since December   Very poor appetite  Cardiology holding Entresto and Farxiga for 2 weeks   Did ultrasound aorta     Past recap   new patient visit  Follow-up on hypertension heart disease  Due for blood work  Complaining of having leg pains  Also complaining of having back pain    Past medical history: MI, pacemaker/defibrillator, ejection fraction 20%, AAA 4.6 x 3.8,  Occupation: Retired volunteer at school  Social history: Smokes denies drinking denies drugs  Review of Systems    Objective   LMP  (LMP Unknown)     Physical Exam  Vitals reviewed.   Constitutional:       Appearance: Normal appearance.   HENT:      Head: Normocephalic and atraumatic.      Right Ear: Tympanic membrane, ear canal and external ear normal.      Left Ear: Tympanic membrane, ear canal and external ear normal.      Nose: Nose normal.      Mouth/Throat:      Pharynx: Oropharynx is clear.   Eyes:      Extraocular Movements: Extraocular movements intact.      Conjunctiva/sclera: Conjunctivae normal.      Pupils: Pupils are equal, round, and reactive to light.   Cardiovascular:      Rate and Rhythm: Normal rate and regular rhythm.      Pulses: Normal pulses.      Heart sounds: Normal heart sounds.   Pulmonary:      Effort: Pulmonary effort is normal.      Breath sounds: Normal breath sounds.   Abdominal:      General: Abdomen is flat. Bowel sounds are normal.      Palpations: Abdomen is soft.   Musculoskeletal:      Cervical back: Normal range of motion and neck supple.   Skin:     General: Skin is warm and dry.   Neurological:      General: No focal deficit present.      Mental Status: She is alert and oriented to person, place, and time.   Psychiatric:         Mood and Affect: Mood normal.         Assessment/Plan    Problem List Items Addressed This Visit             ICD-10-CM       Cardiac and Vasculature    Hypertension I10    Relevant Medications    pantoprazole (ProtoNix) 40 mg EC tablet    Other Relevant Orders    CBC    Comprehensive Metabolic Panel    Thyroid Stimulating Hormone    Referral to Cardiac Rehab       Musculoskeletal and Injuries    Fall, initial encounter W19.XXXA    Relevant Medications    sacubitriL-valsartan (Entresto) 24-26 mg tablet    metoprolol succinate XL (Toprol-XL) 25 mg 24 hr tablet     Other Visit Diagnoses         Codes    Peripheral arterial disease (CMS-[facility])    -  Primary I73.9    Elevated cholesterol     E78.00    Elevated blood sugar     R73.9    Relevant Orders    Hemoglobin A1C    Lipid Panel    Mixed hyperlipidemia     E78.2    Relevant Medications    pravastatin (Pravachol) 20 mg tablet    Other Relevant Orders    Lipid Panel    Subacute cough     R05.2    Poor appetite     R63.0        Past recap  Old chart reviewed  CBC CMP fasting with magnesium BNP CPK ordered  Will check UA CNS for the back pain  PVR studies for the leg pain  Ultrasound abdomen for AAA  Encouraged to quit smoking  Follow-up after the test    1/24/2025  Patient is not orthostatic today  Blood pressure still on the lower side  Patient needs to eat better  Discussed ways to improve food intake  Poor appetite and cough may be related to acid reflux  Patient does get acid reflux  Treat with Protonix 40 mg twice a day  Encourage patient to go for cardiac rehab  Ultrasound aorta shows moderate to severe atherosclerosis of the aortic vessels  Refer to vascular  Follow-up in a month

## 2025-02-06 LAB
ALBUMIN SERPL-MCNC: 4.4 G/DL (ref 3.6–5.1)
ALP SERPL-CCNC: 85 U/L (ref 37–153)
ALT SERPL-CCNC: 14 U/L (ref 6–29)
ANION GAP SERPL CALCULATED.4IONS-SCNC: 8 MMOL/L (CALC) (ref 7–17)
AST SERPL-CCNC: 17 U/L (ref 10–35)
BILIRUB SERPL-MCNC: 0.8 MG/DL (ref 0.2–1.2)
BUN SERPL-MCNC: 19 MG/DL (ref 7–25)
CALCIUM SERPL-MCNC: 9.8 MG/DL (ref 8.6–10.4)
CHLORIDE SERPL-SCNC: 103 MMOL/L (ref 98–110)
CHOLEST SERPL-MCNC: 190 MG/DL
CHOLEST/HDLC SERPL: 2.8 (CALC)
CO2 SERPL-SCNC: 28 MMOL/L (ref 20–32)
CREAT SERPL-MCNC: 0.74 MG/DL (ref 0.6–1)
EGFRCR SERPLBLD CKD-EPI 2021: 83 ML/MIN/1.73M2
ERYTHROCYTE [DISTWIDTH] IN BLOOD BY AUTOMATED COUNT: 13.4 % (ref 11–15)
EST. AVERAGE GLUCOSE BLD GHB EST-MCNC: 120 MG/DL
EST. AVERAGE GLUCOSE BLD GHB EST-SCNC: 6.6 MMOL/L
GLUCOSE SERPL-MCNC: 107 MG/DL (ref 65–99)
HBA1C MFR BLD: 5.8 % OF TOTAL HGB
HCT VFR BLD AUTO: 47.4 % (ref 35–45)
HDLC SERPL-MCNC: 67 MG/DL
HGB BLD-MCNC: 16 G/DL (ref 11.7–15.5)
LDLC SERPL CALC-MCNC: 107 MG/DL (CALC)
MCH RBC QN AUTO: 30.9 PG (ref 27–33)
MCHC RBC AUTO-ENTMCNC: 33.8 G/DL (ref 32–36)
MCV RBC AUTO: 91.7 FL (ref 80–100)
NONHDLC SERPL-MCNC: 123 MG/DL (CALC)
PLATELET # BLD AUTO: 273 THOUSAND/UL (ref 140–400)
PMV BLD REES-ECKER: 12.3 FL (ref 7.5–12.5)
POTASSIUM SERPL-SCNC: 4.5 MMOL/L (ref 3.5–5.3)
PROT SERPL-MCNC: 7.2 G/DL (ref 6.1–8.1)
RBC # BLD AUTO: 5.17 MILLION/UL (ref 3.8–5.1)
SODIUM SERPL-SCNC: 139 MMOL/L (ref 135–146)
TRIGL SERPL-MCNC: 75 MG/DL
TSH SERPL-ACNC: 2.31 MIU/L (ref 0.4–4.5)
WBC # BLD AUTO: 6.4 THOUSAND/UL (ref 3.8–10.8)

## 2025-02-06 NOTE — PROGRESS NOTES
12/21/24 1145   Discharge Planning   Support Systems Spouse/significant other;Children   Type of Residence Private residence   Home or Post Acute Services None   Expected Discharge Disposition Home   Does the patient need discharge transport arranged? No        dalia

## 2025-02-07 ENCOUNTER — OFFICE VISIT (OUTPATIENT)
Dept: VASCULAR SURGERY | Facility: HOSPITAL | Age: 80
End: 2025-02-07
Payer: MEDICARE

## 2025-02-07 VITALS
SYSTOLIC BLOOD PRESSURE: 100 MMHG | DIASTOLIC BLOOD PRESSURE: 68 MMHG | HEIGHT: 70 IN | HEART RATE: 71 BPM | OXYGEN SATURATION: 96 % | WEIGHT: 122 LBS | BODY MASS INDEX: 17.47 KG/M2

## 2025-02-07 DIAGNOSIS — I71.40 ABDOMINAL AORTIC ANEURYSM (AAA) WITHOUT RUPTURE, UNSPECIFIED PART (CMS-HCC): Primary | ICD-10-CM

## 2025-02-07 PROCEDURE — 99214 OFFICE O/P EST MOD 30 MIN: CPT | Performed by: NURSE PRACTITIONER

## 2025-02-07 PROCEDURE — 3074F SYST BP LT 130 MM HG: CPT | Performed by: NURSE PRACTITIONER

## 2025-02-07 PROCEDURE — 99406 BEHAV CHNG SMOKING 3-10 MIN: CPT | Performed by: NURSE PRACTITIONER

## 2025-02-07 PROCEDURE — 3078F DIAST BP <80 MM HG: CPT | Performed by: NURSE PRACTITIONER

## 2025-02-07 PROCEDURE — 4004F PT TOBACCO SCREEN RCVD TLK: CPT | Performed by: NURSE PRACTITIONER

## 2025-02-10 NOTE — PROGRESS NOTES
Vascular Surgery Clinic Note    CC: FUV AAA     History Of Present Illness:   Nevaeh Pfeiffer is a 79 y.o. female here for routine follow up. She has an abdominal aortic aneurysm that is being monitored for growth. She denies any new abdominal or back pain. She does have chronic low back pain that she attributes to kidney stones. She denies any known family history of aneurysmal disease. She continues to smoke 1 ppd. Her blood pressure is well controlled.     She reports a 60 pound unintentional weight loss over the past year. She denies postprandial pain. She denies amaurosis fugax or TIA symptoms. She denies claudication symptoms. She does not walk a distance due to SOB.    PMH: COPD, CHF (EF 15-20%), raynauds phenomenon, CAD (2 coronary stents), AAA, HTN, HLD  SH: current smoker, lives with  and grandson     Medical History:  Patient Active Problem List   Diagnosis    Abdominal aortic aneurysm (AAA) (CMS-[facility])    Benign essential hypertension    CAD S/P percutaneous coronary angioplasty    Cardiac defibrillator in place    Cardiomyopathy    Chronic systolic heart failure    Atherosclerotic heart disease of native coronary artery with other forms of angina pectoris    Coronary artery disease involving native coronary artery of native heart without angina pectoris    Current smoker    Gastroesophageal reflux disease    Hyperglycemia    Hypertension    Ankle injury, left, initial encounter    Fracture of foot, left, closed, initial encounter    Fracture of foot    Intermittent claudication (CMS-HCC)    Ischemic myocardial dysfunction    Hypercholesterolemia    Arthritis    Rotator cuff tendinitis    Sciatica    Shortness of breath    Ventricular tachycardia (Multi)    Tick bite    Vasovagal syncope    Ventricular fibrillation (Multi)    Vertigo    Influenza    History of sustained ventricular tachycardia    Non-ST elevation (NSTEMI) myocardial infarction (Multi)    Fall, initial encounter    Syncope    Tobacco  use disorder        SH:    Social Drivers of Health     Tobacco Use: High Risk (1/14/2025)    Patient History     Smoking Tobacco Use: Some Days     Smokeless Tobacco Use: Never     Passive Exposure: Never   Alcohol Use: Not At Risk (12/19/2024)    AUDIT-C     Frequency of Alcohol Consumption: Never     Average Number of Drinks: Patient does not drink     Frequency of Binge Drinking: Never   Financial Resource Strain: Low Risk  (12/20/2024)    Overall Financial Resource Strain (CARDIA)     Difficulty of Paying Living Expenses: Not hard at all   Food Insecurity: No Food Insecurity (12/19/2024)    Hunger Vital Sign     Worried About Running Out of Food in the Last Year: Never true     Ran Out of Food in the Last Year: Never true   Transportation Needs: No Transportation Needs (12/20/2024)    PRAPARE - Transportation     Lack of Transportation (Medical): No     Lack of Transportation (Non-Medical): No   Physical Activity: Insufficiently Active (12/19/2024)    Exercise Vital Sign     Days of Exercise per Week: 3 days     Minutes of Exercise per Session: 30 min   Stress: No Stress Concern Present (12/19/2024)    [employer] - Occupational Stress Questionnaire     Feeling of Stress : Not at all   Social Connections: Moderately Isolated (12/19/2024)    Social Connection and Isolation Panel [NHANES]     Frequency of Communication with Friends and Family: More than three times a week     Frequency of Social Gatherings with Friends and Family: Three times a week     Attends Confucianism Services: Never     Active Member of Clubs or Organizations: No     Attends Club or Organization Meetings: Never     Marital Status:    Intimate Partner Violence: Not At Risk (12/19/2024)    Humiliation, Afraid, Rape, and Kick questionnaire     Fear of Current or Ex-Partner: No     Emotionally Abused: No     Physically Abused: No     Sexually Abused: No   Depression: Not at risk (1/14/2025)    PHQ-2     PHQ-2  "Score: 0   Housing Stability: Low Risk  (12/20/2024)    Housing Stability Vital Sign     Unable to Pay for Housing in the Last Year: No     Number of Times Moved in the Last Year: 0     Homeless in the Last Year: No   Utilities: Not At Risk (12/19/2024)    [facility] Utilities     Threatened with loss of utilities: No   Digital Equity: Not on file   Health Literacy: Adequate Health Literacy (12/19/2024)     Health Literacy     Frequency of need for help with medical instructions: Never        FH:  Family History   Problem Relation Name Age of Onset    Cancer Mother  61    Heart attack Father  67        Allergies:   Allergies   Allergen Reactions    Lisinopril Cough    Other Swelling     PCN  TONGUE SWELLING    Penicillins Unknown    Statins-Hmg-Coa Reductase Inhibitors Myalgia    Warfarin Unknown     Blood Blisters       ROS:  All systems were reviewed and noted to be negative, other than described above.     Objective:  Last Recorded Vitals  Blood pressure 100/68, pulse 71, height 1.778 m (5' 10\"), weight 55.3 kg (122 lb), SpO2 96%.    Meds:   Current Outpatient Medications   Medication Instructions    aspirin 81 mg, Daily    cholecalciferol, vitamin D3, (VITAMIN D3 ORAL) Take 1,000 Units by mouth once daily.    coenzyme Q-10 100 mg capsule Take by mouth.    Farxiga 10 mg, oral, Daily    metoprolol succinate XL (TOPROL-XL) 25 mg, oral, Daily, Do not crush or chew.    mexiletine (MEXITIL) 150 mg, oral, Every 8 hours    pantoprazole (PROTONIX) 40 mg, oral, Daily, Do not crush, chew, or split.    pravastatin (PRAVACHOL) 20 mg, oral, Daily    sacubitriL-valsartan (Entresto) 24-26 mg tablet 0.5 tablets, oral, 2 times daily       Exam:  Constitutional: Well appearing, NAD   PSYCH: Appropriate mood and affect  Eyes: Sclera clear   Neck: Supple   CV: No tachycardia   RESP: Unlabored breathing   GI: Soft, nontender, non-distended. Nontender aorta.   SKIN: No lesions  NEURO: No focal deficits noted. Motor/sensory intact. "   EXTREMITIES: Warm & well perfused. No leg edema. Ruborous feet.   PULSES: Normal pulse exam throughout     Imaging Reviewed:  US aorta iliac vessels doppler complete 12/31/2024    Narrative  Interpreted By:  Travis Izquierdo,  STUDY:  US AORTA ILIAC VESSELS DOPPLER COMPLETE;  12/31/2024 9:25 am    INDICATION:  Signs/Symptoms: screening.    COMPARISON:  Vascular ultrasound 06/02/2023    ACCESSION NUMBER(S):  [id number]    ORDERING CLINICIAN:  TENNILLE LAGOS    TECHNIQUE:  Transabdominal imaging of the abdominal aorta was performed.  Grayscale, color, and spectral Doppler were performed.    FINDINGS:  Moderate-to-severe atherosclerosis throughout the aortoiliac arteries.    The proximal abdominal aorta measures 1.9 CM in its maximal AP  diameter and 2.3 CM in its maximal transverse diameter.    The mid abdominal aorta measures 2.3 CM in its maximal AP diameter  and 3.5 cm in its maximal transverse diameter.    Multifocal fusiform aneurysm of the abdominal aorta with the largest  component in the infrarenal aorta, where there is redemonstration of  fusiform aneurysm measuring 4.5 x 4.6 cm in maximal diameter,  previously 3.9 x 4.0 cm. As before, there is peripheral mural  thrombus in the aneurysm sac.    The visualized portions of the right common iliac artery measures 0.9  cm in its maximal AP diameter and 0.9 cm in its maximal transverse  diameter.    The visualized portions of the left common iliac artery measures 1.3  cm in its maximal AP diameter and 1.1 cm in its maximal transverse  diameter.    Impression  Moderate-to-severe atherosclerosis of the aortoiliac arteries with  redemonstration of multifocal fusiform aneurysms throughout the  abdominal aorta. The largest component is in the infrarenal aorta  with maximum AP x  transverse measurements of 4.5 x 4.6 cm which is  mildly increased compared to 06/02/2023 where it was 3.9 x 4 cm.  Recommend vascular surgery consultation for management and  surveillance  recommendations. As before, there is peripheral mural  thrombus outlining the posterior margin of the aneurysm sac.    MACRO:  Critical Finding:  See findings. Notification was initiated on  1/2/2025 at 5:45 pm by  Travis Izquierdo.  (**-YCF-**) Instructions:    Signed by: Travis Izquierdo 1/2/2025 5:46 PM  Dictation workstation:   [id number]        Assessment & Plan:  1. Abdominal aortic aneurysm (AAA) without rupture, unspecified part (CMS-HCC)  CT angio abdomen pelvis w and or wo IV IV contrast        AAA measuring 4.5 x 4.6 cm on recent duplex, previously 3.9 x 3.8 cm in June 2023.   Smoking cessation   Blood pressure control  Follow up with PCP re: weight loss & need for up-to-date cancer screenings   Continue aspirin & statin   Obtain CTA A/P to evaluate aneurysm & mesenteric vasculature     Ready to quit: No  Counseling given: Yes    Orders:  Orders Placed This Encounter   Procedures    CT angio abdomen pelvis w and or wo IV IV contrast     Lisa Ga, APRN-CNP

## 2025-02-11 ENCOUNTER — APPOINTMENT (OUTPATIENT)
Dept: CARDIOLOGY | Facility: CLINIC | Age: 80
End: 2025-02-11
Payer: MEDICARE

## 2025-02-11 ENCOUNTER — HOSPITAL ENCOUNTER (OUTPATIENT)
Dept: CARDIOLOGY | Facility: CLINIC | Age: 80
Discharge: HOME | End: 2025-02-11
Payer: MEDICARE

## 2025-02-11 VITALS
WEIGHT: 127 LBS | HEIGHT: 70 IN | DIASTOLIC BLOOD PRESSURE: 75 MMHG | OXYGEN SATURATION: 95 % | BODY MASS INDEX: 18.18 KG/M2 | HEART RATE: 68 BPM | SYSTOLIC BLOOD PRESSURE: 111 MMHG

## 2025-02-11 DIAGNOSIS — R55 SYNCOPE AND COLLAPSE: ICD-10-CM

## 2025-02-11 DIAGNOSIS — I47.20 VENTRICULAR TACHYCARDIA (MULTI): ICD-10-CM

## 2025-02-11 DIAGNOSIS — R00.2 PALPITATIONS: Primary | ICD-10-CM

## 2025-02-11 DIAGNOSIS — Z95.810 CARDIAC DEFIBRILLATOR IN PLACE: ICD-10-CM

## 2025-02-11 DIAGNOSIS — R00.2 PALPITATIONS: ICD-10-CM

## 2025-02-11 DIAGNOSIS — I42.9 CARDIOMYOPATHY, UNSPECIFIED TYPE (MULTI): ICD-10-CM

## 2025-02-11 PROCEDURE — 99214 OFFICE O/P EST MOD 30 MIN: CPT | Performed by: INTERNAL MEDICINE

## 2025-02-11 PROCEDURE — 93246 EXT ECG>7D<15D RECORDING: CPT

## 2025-02-11 PROCEDURE — 1126F AMNT PAIN NOTED NONE PRSNT: CPT | Performed by: INTERNAL MEDICINE

## 2025-02-11 PROCEDURE — 3078F DIAST BP <80 MM HG: CPT | Performed by: INTERNAL MEDICINE

## 2025-02-11 PROCEDURE — 3074F SYST BP LT 130 MM HG: CPT | Performed by: INTERNAL MEDICINE

## 2025-02-11 PROCEDURE — 1159F MED LIST DOCD IN RCRD: CPT | Performed by: INTERNAL MEDICINE

## 2025-02-11 PROCEDURE — 93005 ELECTROCARDIOGRAM TRACING: CPT | Mod: 59 | Performed by: INTERNAL MEDICINE

## 2025-02-11 ASSESSMENT — ENCOUNTER SYMPTOMS
DEPRESSION: 0
OCCASIONAL FEELINGS OF UNSTEADINESS: 1
LOSS OF SENSATION IN FEET: 0

## 2025-02-11 ASSESSMENT — COLUMBIA-SUICIDE SEVERITY RATING SCALE - C-SSRS
2. HAVE YOU ACTUALLY HAD ANY THOUGHTS OF KILLING YOURSELF?: NO
1. IN THE PAST MONTH, HAVE YOU WISHED YOU WERE DEAD OR WISHED YOU COULD GO TO SLEEP AND NOT WAKE UP?: NO
6. HAVE YOU EVER DONE ANYTHING, STARTED TO DO ANYTHING, OR PREPARED TO DO ANYTHING TO END YOUR LIFE?: NO

## 2025-02-11 ASSESSMENT — PAIN SCALES - GENERAL: PAINLEVEL_OUTOF10: 0-NO PAIN

## 2025-02-11 NOTE — PATIENT INSTRUCTIONS
It was nice to see you today!  We discussed that your recent fainting spell did not occur in the setting of an abnormal heart rhythm.    However, you are experiencing episodes of ventricular tachycardia and you are having extra heart beats.  We discussed that you would wear the heart monitor for 14 days.  After 24 hours it can go in the shower. Please push the button when  you have your typical symptoms. In 14 days remove the monitor and return to a [employer] drop off.  We will then call you with results and next steps in about 7-10 days after return.  You should do all of your normal activities while you are wearing the monitor.   Please call if any questions [phone].     Dr Ojeda will communicate with Dr Barajas regarding the clopidogrel question and to discuss your heart failure treatment plan.

## 2025-02-11 NOTE — PROGRESS NOTES
"Returns for follow up visit for ischemic cardiomyopathy and VT        History Of Present Illness:    Nevaeh Pfeiffer is a 79 y.o. year old female patient     Nevaeh Pfeiffer is a 79 y.o. year old female patient      She reports an episode of loss of consciousness while standing after turning her head to the left. . Mid-morning while standing - she had not eaten any breakfast but had taken her medication.  No seizure activity. No incontinence.     78 yo with history of nonsustained VT. She had ICD implanted in for ischemic cardiomyopathy, primary prevention, with replacement in 2016. She is on mexiletine for suppression.   The device was checked today. she was last checked in 2016 but then was lost to follow up for a short time.   Past cardiac history sign for 2003 PCI s/p AWMI NSVT  The device went in for primary prevention but she had an event Jan 2021 - in which the device treated an episode of VT with ATP successfully.  She has had recurrent VT treated with ATP in 4/2023   On amiodarone for VT suppression she experienced hair loss and Dr Barajas stopped the amiodarone.        She is also experiencing \"heart pounding out of her chest\" it is brought on by vacuuming and she stops what she is doing for it to go away. She also experiences lightheadedness at other times.      She denies chest pain, she denies orthopnea or PND, she has had weight loss ? On the farxiga.  BNP remains elevated.    Past Medical History:  Past Medical History:   Diagnosis Date    Abdominal aortic aneurysm (AAA) without rupture (CMS-[facility]) 11/12/2023    Atherosclerotic heart disease of native coronary artery with other forms of angina pectoris 11/12/2023    Benign essential hypertension 11/12/2023    CAD S/P percutaneous coronary angioplasty 11/12/2023    Cardiac defibrillator in place 11/12/2023    Chronic systolic heart failure 11/12/2023    Intermittent claudication (CMS-HCC) 11/12/2023    Shortness of breath 11/12/2023    Sustained ventricular " "tachycardia (Multi) 11/12/2023    Tobacco use disorder 12/21/2024    Ventricular fibrillation (Multi) 11/12/2023       Past Surgical History:  Past Surgical History:   Procedure Laterality Date    CARDIAC DEFIBRILLATOR PLACEMENT      CHOLECYSTECTOMY      CORONARY ANGIOPLASTY WITH STENT PLACEMENT      HYSTERECTOMY      PACEMAKER PLACEMENT            Social History:  Caffeine: 2 cups/day coffee  Cigarettes: still smoking  ETOH: none  Drugs: none  Exercise: min  Occ: homemaker  Marital status:  M ( nephew lives with her and her )    Family History:  Family History   Problem Relation Name Age of Onset    Cancer Mother  61    Heart attack Father  67         Allergies:  Allergies   Allergen Reactions    Lisinopril Cough    Other Swelling     PCN  TONGUE SWELLING    Penicillins Unknown    Statins-Hmg-Coa Reductase Inhibitors Myalgia    Warfarin Unknown     Blood Blisters        Outpatient Medications:  Current Outpatient Medications   Medication Instructions    aspirin 81 mg, Daily    cholecalciferol, vitamin D3, (VITAMIN D3 ORAL) Take 1,000 Units by mouth once daily.    coenzyme Q-10 100 mg capsule Take by mouth.    Farxiga 10 mg, oral, Daily    metoprolol succinate XL (TOPROL-XL) 25 mg, oral, Daily, Do not crush or chew.    mexiletine (MEXITIL) 150 mg, oral, Every 8 hours    pantoprazole (PROTONIX) 40 mg, oral, Daily, Do not crush, chew, or split.    pravastatin (PRAVACHOL) 20 mg, oral, Daily    sacubitriL-valsartan (Entresto) 24-26 mg tablet 0.5 tablets, oral, 2 times daily         Last Recorded Vitals:      12/21/2024    12:14 PM 12/21/2024    12:48 PM 12/21/2024    12:50 PM 12/21/2024    12:54 PM 1/14/2025     2:44 PM 2/7/2025     9:43 AM 2/7/2025     9:44 AM   Vitals   Systolic 102 100 99 88 98 116 100   Diastolic 63 63 62 76 52 73 68   BP Location Right arm     Right arm Left arm   Heart Rate 60 61 63 61 88 71    Temp 36.7 °C (98.1 °F)         Resp 18         Height      1.778 m (5' 10\")    Weight (lb)     " 127 122    BMI     18.75 kg/m2 17.51 kg/m2    BSA (m2)     1.67 m2 1.65 m2    Visit Report     Report Report Report        Physical Exam:  Physical Exam  Constitutional:       Appearance: Normal appearance. She is ill-appearing.   HENT:      Head: Normocephalic.      Nose: Nose normal.   Neck:      Vascular: No carotid bruit.      Comments: ? Right bruit    Cardiovascular:      Rate and Rhythm: Normal rate and regular rhythm.      Heart sounds: Normal heart sounds. No murmur heard.  Pulmonary:      Effort: Pulmonary effort is normal.      Breath sounds: Normal breath sounds. No rales.   Chest:          Comments: Prominent ICD due to weight loss - non tender - no erythema  Musculoskeletal:         General: Normal range of motion.      Left lower leg: No edema.   Skin:     General: Skin is warm and dry.   Neurological:      General: No focal deficit present.      Mental Status: She is alert and oriented to person, place, and time.   Psychiatric:         Mood and Affect: Mood normal.         Behavior: Behavior normal.          Last Cardiology Tests:  ECG:  Encounter Date: 12/19/24   ECG 12 lead   Result Value    Ventricular Rate 85    Atrial Rate 85    MS Interval 168    QRS Duration 100    QT Interval 368    QTC Calculation(Bazett) 437    P Axis 75    R Axis -83    T Axis 78    QRS Count 14    Q Onset 215    P Onset 131    P Offset 184    T Offset 399    QTC Fredericia 413    Narrative    Sinus rhythm with Premature atrial complexes with Aberrant conduction  Biatrial enlargement  Left axis deviation  Inferior infarct , age undetermined  Anterolateral infarct (cited on or before 29-OCT-2013)  Nonspecific intraventricular block  When compared with ECG of 14-NOV-2023 16:05,  Significant changes have occurred  Confirmed by Otis Ribeiro ([id number]) on 12/22/2024 2:42:35 PM        Echo:  Transthoracic Echo (TTE) Complete    Result Date: 12/20/2024            [facility] [address], [place] [address]              Phone [phone] TRANSTHORACIC ECHOCARDIOGRAM REPORT Patient Name:       ESTEBAN YOUNG        Petros Physician:    [id number] Nate Machado MD Study Date:         12/20/2024           Ordering Provider:    [id number] [place] MUNIRA REYES MRN/PID:            [id number]             Fellow: Accession#:         [id number]         Nurse: Date of Birth/Age:  1945 / 79 years Sonographer:          Maggi Rizo RDCS Gender Assigned at  F                    Additional Staff: Birth: Height:             175.26 cm            Admit Date: Weight:             54.89 kg             Admission Status:     Inpatient -                                                                Routine BSA / BMI:          1.67 m2 / 17.87      Department Location:  [facility]                     kg/m2 Blood Pressure: 95 /60 mmHg Study Type:    TRANSTHORACIC ECHO (TTE) COMPLETE Diagnosis/ICD: Syncope-R55 Indication:    Syncope CPT Codes:     Echo Complete w Full [id number] Patient History: Pertinent History: HTN, CAD, Cardiomyopathy and CHF. NSTEMI, V-Tach, Syncope,                    Defibrillator. Study Detail: The following Echo studies were performed: 2D, M-Mode, Doppler and               color flow. Definity used as a contrast agent for endocardial               border definition. Total contrast used for this procedure was 3 mL               via IV push.  PHYSICIAN INTERPRETATION: Left Ventricle: The left ventricular systolic function is severely decreased, with a visually estimated ejection fraction of 15-20%. Wall motion is abnormal. The left ventricular cavity size is severely dilated. Spectral Doppler shows an abnormal pattern of left ventricular diastolic filling. There is severe global LV hypokinesis with akinesis of the anterosptal wall and LV  apex. Left Atrium: The left atrium is normal in size. Right Ventricle: The right ventricle is normal in size. There is normal right ventriclar wall thickness. There is normal right ventricular global systolic function. A device is visualized in the right ventricle. Right Atrium: The right atrium is normal in size. There is a device visualized in the right atrium. Aortic Valve: The aortic valve is trileaflet. The aortic valve dimensionless index is 0.66. There is no evidence of aortic valve regurgitation. The peak instantaneous gradient of the aortic valve is 6 mmHg. The mean gradient of the aortic valve is 3 mmHg. Mitral Valve: The mitral valve is normal in structure. There is normal mitral valve leaflet mobility. There is mild mitral annular calcification. There is mild mitral valve regurgitation. Tricuspid Valve: The tricuspid valve is structurally normal. There is normal tricuspid valve leaflet mobility. There is trace tricuspid regurgitation. Pulmonic Valve: The pulmonic valve is structurally normal. There is trace pulmonic valve regurgitation. Pericardium: No pericardial effusion noted. Aorta: The aortic root is normal. There is no dilatation of the aortic arch. There is no dilatation of the ascending aorta. There is no dilatation of the aortic root. Pulmonary Artery: The pulmonary artery is normal in size. The tricuspid regurgitant velocity is 1.60 m/s, and with an estimated right atrial pressure of 3 mmHg, the estimated pulmonary artery pressure is normal with the RVSP at 13.2 mmHg. Systemic Veins: The inferior vena cava appears normal in size, with IVC inspiratory collapse greater than 50%.  CONCLUSIONS:  1. The left ventricular systolic function is severely decreased, with a visually estimated ejection fraction of 15-20%.  2. Abnormal wall motion.  3. Spectral Doppler shows an abnormal pattern of left ventricular diastolic filling.  4. Left ventricular cavity size is severely dilated.  5. There is severe  global LV hypokinesis with akinesis of the anterosptal wall and LV apex.  6. There is normal right ventricular global systolic function.  7. Mild mitral valve regurgitation. QUANTITATIVE DATA SUMMARY:  2D MEASUREMENTS:            Normal Ranges: LAs:             2.90 cm    (2.7-4.0cm) IVSd:            0.72 cm    (0.6-1.1cm) LVPWd:           1.22 cm    (0.6-1.1cm) LVIDd:           5.45 cm    (3.9-5.9cm) LVIDs:           4.94 cm LV Mass Index:   120.8 g/m2 LV % FS          9.4 %  M-MODE MEASUREMENTS:         Normal Ranges: Ao Root:             3.00 cm (2.0-3.7cm)  AORTA MEASUREMENTS:         Normal Ranges: Ao Sinus, d:        3.03 cm (2.1-3.5cm)  LV SYSTOLIC FUNCTION BY 2D PLANIMETRY (MOD):                      Normal Ranges: EF-A4C View:    23 % (>=55%) EF-A2C View:    30 % EF-Biplane:     26 % EF-Visual:      18 % LV EF Reported: 18 %  LV DIASTOLIC FUNCTION:          Normal Ranges: MV lateral e'          0.05 m/s  AORTIC VALVE:                     Normal Ranges: AoV Vmax:                1.22 m/s (<=1.7m/s) AoV Peak P.0 mmHg (<20mmHg) AoV Mean PG:             3.0 mmHg (1.7-11.5mmHg) LVOT Max Brandon:            0.87 m/s (<=1.1m/s) AoV VTI:                 29.50 cm (18-25cm) LVOT VTI:                19.40 cm LVOT Diameter:           2.10 cm  (1.8-2.4cm) AoV Area, VTI:           2.28 cm2 (2.5-5.5cm2) AoV Area,Vmax:           2.48 cm2 (2.5-4.5cm2) AoV Dimensionless Index: 0.66  RIGHT VENTRICLE: TAPSE: 17.5 mm  TRICUSPID VALVE/RVSP:          Normal Ranges: Peak TR Velocity:     1.60 m/s RV Syst Pressure:     13 mmHg  (< 30mmHg) IVC Diam:             1.42 cm  [id number] Nate Machado MD Electronically signed on 2024 at 10:37:44 PM  ** Final **         Lab review: I have Chemistry CMP:   Lab Results   Component Value Date    ALBUMIN 4.4 2025    CALCIUM 9.8 2025    CO2 28 2025    CREATININE 0.74 2025    GLUCOSE 107 (H) 2025    BILITOT 0.8 2025    PROT 7.2 2025    ALT 14  02/05/2025    AST 17 02/05/2025    ALKPHOS 85 02/05/2025   , Chemistry BMP   Lab Results   Component Value Date    GLUCOSE 107 (H) 02/05/2025    CALCIUM 9.8 02/05/2025    CO2 28 02/05/2025    CREATININE 0.74 02/05/2025   , and CBC:  Lab Results   Component Value Date    WBC 6.4 02/05/2025    RBC 5.17 (H) 02/05/2025    HGB 16.0 (H) 02/05/2025    HCT 47.4 (H) 02/05/2025    MCV 91.7 02/05/2025    MCH 30.9 02/05/2025    MCHC 33.8 02/05/2025    RDW 13.4 02/05/2025    MPV 12.3 02/05/2025    NRBC 0.0 12/21/2024       Assessment/Plan   Problem List Items Addressed This Visit             ICD-10-CM    Ventricular tachycardia (Multi) I47.20    Relevant Orders    ECG 12 lead (Clinic Performed) (Completed)    Holter or Event Cardiac Monitor     Other Visit Diagnoses         Codes    Palpitations    -  Primary R00.2    Relevant Orders    Holter or Event Cardiac Monitor    Syncope and collapse     R55    Relevant Orders    Holter or Event Cardiac Monitor          Concerned that she is having more VT than the device is recognizing as some of the non sustained SVT may be VT  - she is having daily heart poundings / palpitations and if this is all VT than we will need to revisit AAD vs catheter ablation.   Marissa Ojeda MD

## 2025-02-12 LAB — BODY SURFACE AREA: 1.69 M2

## 2025-02-13 LAB
ATRIAL RATE: 73 BPM
BODY SURFACE AREA: 1.69 M2
P AXIS: 52 DEGREES
P OFFSET: 148 MS
P ONSET: 99 MS
PR INTERVAL: 158 MS
Q ONSET: 178 MS
QRS COUNT: 12 BEATS
QRS DURATION: 142 MS
QT INTERVAL: 406 MS
QTC CALCULATION(BAZETT): 447 MS
QTC FREDERICIA: 433 MS
R AXIS: -71 DEGREES
T AXIS: 93 DEGREES
T OFFSET: 381 MS
VENTRICULAR RATE: 73 BPM

## 2025-02-20 ENCOUNTER — TELEPHONE (OUTPATIENT)
Age: 80
End: 2025-02-20
Payer: MEDICARE

## 2025-02-25 ENCOUNTER — APPOINTMENT (OUTPATIENT)
Dept: CARDIOLOGY | Facility: CLINIC | Age: 80
End: 2025-02-25
Payer: MEDICARE

## 2025-02-28 ENCOUNTER — HOSPITAL ENCOUNTER (OUTPATIENT)
Dept: RADIOLOGY | Facility: HOSPITAL | Age: 80
Discharge: HOME | End: 2025-02-28
Payer: MEDICARE

## 2025-02-28 DIAGNOSIS — I71.40 ABDOMINAL AORTIC ANEURYSM (AAA) WITHOUT RUPTURE, UNSPECIFIED PART (CMS-HCC): ICD-10-CM

## 2025-02-28 PROCEDURE — 2550000001 HC RX 255 CONTRASTS: Performed by: NURSE PRACTITIONER

## 2025-02-28 PROCEDURE — 74174 CTA ABD&PLVS W/CONTRAST: CPT

## 2025-02-28 RX ADMIN — IOHEXOL 75 ML: 350 INJECTION, SOLUTION INTRAVENOUS at 14:26

## 2025-03-04 LAB
ATRIAL RATE: 73 BPM
P AXIS: 52 DEGREES
P OFFSET: 148 MS
P ONSET: 99 MS
PR INTERVAL: 158 MS
Q ONSET: 178 MS
QRS COUNT: 12 BEATS
QRS DURATION: 142 MS
QT INTERVAL: 406 MS
QTC CALCULATION(BAZETT): 447 MS
QTC FREDERICIA: 433 MS
R AXIS: -71 DEGREES
T AXIS: 93 DEGREES
T OFFSET: 381 MS
VENTRICULAR RATE: 73 BPM

## 2025-03-06 ENCOUNTER — APPOINTMENT (OUTPATIENT)
Dept: CARDIOLOGY | Facility: CLINIC | Age: 80
End: 2025-03-06
Payer: MEDICARE

## 2025-03-06 VITALS
SYSTOLIC BLOOD PRESSURE: 125 MMHG | TEMPERATURE: 98.8 F | HEART RATE: 81 BPM | BODY MASS INDEX: 17.9 KG/M2 | DIASTOLIC BLOOD PRESSURE: 76 MMHG | WEIGHT: 125 LBS | HEIGHT: 70 IN | RESPIRATION RATE: 16 BRPM | OXYGEN SATURATION: 95 %

## 2025-03-06 DIAGNOSIS — I10 BENIGN ESSENTIAL HYPERTENSION: ICD-10-CM

## 2025-03-06 DIAGNOSIS — I25.10 CAD S/P PERCUTANEOUS CORONARY ANGIOPLASTY: ICD-10-CM

## 2025-03-06 DIAGNOSIS — E78.2 MIXED HYPERLIPIDEMIA: ICD-10-CM

## 2025-03-06 DIAGNOSIS — I50.22 CHRONIC SYSTOLIC HEART FAILURE: ICD-10-CM

## 2025-03-06 DIAGNOSIS — Z95.810 CARDIAC DEFIBRILLATOR IN PLACE: ICD-10-CM
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
## 2025-03-06 DIAGNOSIS — I25.118 ATHEROSCLEROTIC HEART DISEASE OF NATIVE CORONARY ARTERY WITH OTHER FORMS OF ANGINA PECTORIS: ICD-10-CM

## 2025-03-06 DIAGNOSIS — Z98.61 CAD S/P PERCUTANEOUS CORONARY ANGIOPLASTY: ICD-10-CM

## 2025-03-06 DIAGNOSIS — I42.0 DILATED CARDIOMYOPATHY (MULTI): ICD-10-CM

## 2025-03-06 PROCEDURE — 1159F MED LIST DOCD IN RCRD: CPT | Performed by: INTERNAL MEDICINE

## 2025-03-06 PROCEDURE — 1160F RVW MEDS BY RX/DR IN RCRD: CPT | Performed by: INTERNAL MEDICINE

## 2025-03-06 PROCEDURE — 3078F DIAST BP <80 MM HG: CPT | Performed by: INTERNAL MEDICINE

## 2025-03-06 PROCEDURE — 3074F SYST BP LT 130 MM HG: CPT | Performed by: INTERNAL MEDICINE

## 2025-03-06 PROCEDURE — 1126F AMNT PAIN NOTED NONE PRSNT: CPT | Performed by: INTERNAL MEDICINE

## 2025-03-06 PROCEDURE — 99215 OFFICE O/P EST HI 40 MIN: CPT | Performed by: INTERNAL MEDICINE

## 2025-03-06 PROCEDURE — 4004F PT TOBACCO SCREEN RCVD TLK: CPT | Performed by: INTERNAL MEDICINE

## 2025-03-06 RX ORDER — PRAVASTATIN SODIUM 80 MG/1
80 TABLET ORAL DAILY
Qty: 90 TABLET | Refills: 3 | Status: SHIPPED | OUTPATIENT
Start: 2025-03-06 | End: 2026-03-06

## 2025-03-06 RX ORDER — METOPROLOL SUCCINATE 50 MG/1
50 TABLET, EXTENDED RELEASE ORAL DAILY
Qty: 90 TABLET | Refills: 3 | Status: SHIPPED | OUTPATIENT
Start: 2025-03-06 | End: 2026-03-06

## 2025-03-06 RX ORDER — PRAVASTATIN SODIUM 20 MG/1
80 TABLET ORAL DAILY
Qty: 360 TABLET | Refills: 3 | Status: SHIPPED | OUTPATIENT
Start: 2025-03-06 | End: 2025-03-06

## 2025-03-06 RX ORDER — EZETIMIBE 10 MG/1
10 TABLET ORAL DAILY
Qty: 90 TABLET | Refills: 3 | Status: SHIPPED | OUTPATIENT
Start: 2025-03-06 | End: 2026-03-06

## 2025-03-06 ASSESSMENT — LIFESTYLE VARIABLES
HOW OFTEN DO YOU HAVE A DRINK CONTAINING ALCOHOL: NEVER
SKIP TO QUESTIONS 9-10: 1
AUDIT-C TOTAL SCORE: 0
HOW MANY STANDARD DRINKS CONTAINING ALCOHOL DO YOU HAVE ON A TYPICAL DAY: PATIENT DOES NOT DRINK
HOW OFTEN DO YOU HAVE SIX OR MORE DRINKS ON ONE OCCASION: NEVER

## 2025-03-06 ASSESSMENT — PAIN SCALES - GENERAL: PAINLEVEL_OUTOF10: 0-NO PAIN

## 2025-03-06 ASSESSMENT — PATIENT HEALTH QUESTIONNAIRE - PHQ9
1. LITTLE INTEREST OR PLEASURE IN DOING THINGS: NOT AT ALL
2. FEELING DOWN, DEPRESSED OR HOPELESS: NOT AT ALL
SUM OF ALL RESPONSES TO PHQ9 QUESTIONS 1 AND 2: 0

## 2025-03-06 ASSESSMENT — ENCOUNTER SYMPTOMS
LOSS OF SENSATION IN FEET: 0
DEPRESSION: 0
OCCASIONAL FEELINGS OF UNSTEADINESS: 1

## 2025-03-06 NOTE — PATIENT INSTRUCTIONS
Increase metoprolol to 50 mg 1 tablet a day for 2 weeks once you tolerate that dose better then we will increase it to 50 mg 1 tablet twice daily.  Let me know how you are feeling with that.    Start ezetimibe 10 mg 1 tablet daily for better cholesterol control.    Increase pravastatin to 80 mg 1 tablet daily.    Continue other medications I will see you in 3 months

## 2025-03-06 NOTE — PROGRESS NOTES
History of present illness:  Patient follow-up history of heart failure systolic dysfunction ejection fraction of 20% status post ICD for primary and secondary prevention on mexiletine follows up with Dr. Tripathi. History of ischemic cardiomyopathy. Last 2 D echo this year showed ejection fraction of 20%. Patient underwent percutaneous coronary intervention to chronically occluded LAD In June 2022.  Patient is NYHA class II-III.  Patient returns to my office for follow-up.  Has been complaining of palpitations and underwent monitor by .  Monitor showing overall average heart rate of 80 bpm and some episodes of SVT and nonsustained ventricular tachycardia of short duration.  Patient in the past used to be on metoprolol 100 mg daily but for some reason she is only on 25 .  Still complain of shortness of breath with mild activity NYHA class II-III.  Still smoking.  Denies having chest pain nausea vomiting diaphoresis or syncope.    Past Medical History:   Diagnosis Date    Abdominal aortic aneurysm (AAA) without rupture (CMS-[facility]) 11/12/2023    Atherosclerotic heart disease of native coronary artery with other forms of angina pectoris 11/12/2023    Benign essential hypertension 11/12/2023    CAD S/P percutaneous coronary angioplasty 11/12/2023    Cardiac defibrillator in place 11/12/2023    Chronic systolic heart failure 11/12/2023    Intermittent claudication (CMS-[facility]) 11/12/2023    Shortness of breath 11/12/2023    Sustained ventricular tachycardia (Multi) 11/12/2023    Tobacco use disorder 12/21/2024    Ventricular fibrillation (Multi) 11/12/2023       Past Surgical History:   Procedure Laterality Date    CARDIAC DEFIBRILLATOR PLACEMENT      CHOLECYSTECTOMY      CORONARY ANGIOPLASTY WITH STENT PLACEMENT      HYSTERECTOMY      PACEMAKER PLACEMENT         Allergies   Allergen Reactions    Lisinopril Cough    Other Swelling     PCN  TONGUE SWELLING    Penicillins Unknown    Statins-Hmg-Coa Reductase Inhibitors  Myalgia    Warfarin Unknown     Blood Blisters        reports that she has been smoking cigarettes. She started smoking about 61 years ago. She has a 30.6 pack-year smoking history. She has been exposed to tobacco smoke. She has never used smokeless tobacco. She reports that she does not currently use alcohol. She reports that she does not use drugs.    Family History   Problem Relation Name Age of Onset    Cancer Mother  61    Heart attack Father  67       Patient's Medications   New Prescriptions    No medications on file   Previous Medications    ASPIRIN 81 MG EC TABLET    Take 1 tablet (81 mg) by mouth once daily.    CHOLECALCIFEROL, VITAMIN D3, (VITAMIN D3 ORAL)    Take 1,000 Units by mouth once daily.    COENZYME Q-10 100 MG CAPSULE    Take by mouth.    FARXIGA 10 MG    Take 1 tablet (10 mg) by mouth once daily.    METOPROLOL SUCCINATE XL (TOPROL-XL) 25 MG 24 HR TABLET    Take 1 tablet (25 mg) by mouth once daily. Do not crush or chew.    MEXILETINE (MEXITIL) 150 MG CAPSULE    Take 1 capsule (150 mg) by mouth every 8 hours.    PANTOPRAZOLE (PROTONIX) 40 MG EC TABLET    Take 1 tablet (40 mg) by mouth once daily. Do not crush, chew, or split.    PRAVASTATIN (PRAVACHOL) 20 MG TABLET    Take 1 tablet (20 mg) by mouth once daily.    SACUBITRIL-VALSARTAN (ENTRESTO) 24-26 MG TABLET    Take 0.5 tablets by mouth 2 times a day.   Modified Medications    No medications on file   Discontinued Medications    No medications on file       Objective   Physical Exam  General: Patient in no acute distress   HEENT: Atraumatic normocephalic.  Neck: Supple, jugular venous pressure within normal limit.  No bruits  Lungs: Clear to auscultation bilaterally  Cardiovascular: Regular rate and rhythm, normal heart sounds, no murmurs rubs or gallops  Abdomen: Soft nontender nondistended.  Normal bowel sounds.  Extremities: Warm to touch, no edema.  Lab Review   Hospital Outpatient Visit on 02/11/2025   Component Date Value    BSA  02/11/2025 1.69    Hospital Outpatient Visit on 02/11/2025   Component Date Value    BSA 02/12/2025 1.69    Office Visit on 02/11/2025   Component Date Value    Ventricular Rate 02/11/2025 73     Atrial Rate 02/11/2025 73     WA Interval 02/11/2025 158     QRS Duration 02/11/2025 142     QT Interval 02/11/2025 406     QTC Calculation(Bazett) 02/11/2025 447     P Axis 02/11/2025 52     R Axis 02/11/2025 -71     T Axis 02/11/2025 93     QRS Count 02/11/2025 12     Q Onset 02/11/2025 178     P Onset 02/11/2025 99     P Offset 02/11/2025 148     T Offset 02/11/2025 381     QTC Fredericia 02/11/2025 433    Orders Only on 02/05/2025   Component Date Value    CHOLESTEROL, TOTAL 02/05/2025 190     HDL CHOLESTEROL 02/05/2025 67     TRIGLYCERIDES 02/05/2025 75     LDL-CHOLESTEROL 02/05/2025 107 (H)     CHOL/HDLC RATIO 02/05/2025 2.8     NON HDL CHOLESTEROL 02/05/2025 123     GLUCOSE 02/05/2025 107 (H)     UREA NITROGEN (BUN) 02/05/2025 19     CREATININE 02/05/2025 0.74     EGFR 02/05/2025 83     SODIUM 02/05/2025 139     POTASSIUM 02/05/2025 4.5     CHLORIDE 02/05/2025 103     CARBON DIOXIDE 02/05/2025 28     ELECTROLYTE BALANCE 02/05/2025 8     CALCIUM 02/05/2025 9.8     PROTEIN, TOTAL 02/05/2025 7.2     ALBUMIN 02/05/2025 4.4     BILIRUBIN, TOTAL 02/05/2025 0.8     ALKALINE PHOSPHATASE 02/05/2025 85     AST 02/05/2025 17     ALT 02/05/2025 14     WHITE BLOOD CELL COUNT 02/05/2025 6.4     RED BLOOD CELL COUNT 02/05/2025 5.17 (H)     HEMOGLOBIN 02/05/2025 16.0 (H)     HEMATOCRIT 02/05/2025 47.4 (H)     MCV 02/05/2025 91.7     MCH 02/05/2025 30.9     MCHC 02/05/2025 33.8     RDW 02/05/2025 13.4     PLATELET COUNT 02/05/2025 273     MPV 02/05/2025 12.3     TSH 02/05/2025 2.31     HEMOGLOBIN A1c 02/05/2025 5.8 (H)     eAG (mg/dL) 02/05/2025 120     eAG (mmol/L) 02/05/2025 6.6         Assessment/Plan   Patient Active Problem List   Diagnosis    Abdominal aortic aneurysm (AAA) (CMS-[facility])    Benign essential hypertension    CAD S/P  percutaneous coronary angioplasty    Cardiac defibrillator in place    Cardiomyopathy    Chronic systolic heart failure    Atherosclerotic heart disease of native coronary artery with other forms of angina pectoris    Coronary artery disease involving native coronary artery of native heart without angina pectoris    Current smoker    Gastroesophageal reflux disease    Hyperglycemia    Hypertension    Ankle injury, left, initial encounter    Fracture of foot, left, closed, initial encounter    Fracture of foot    Intermittent claudication (CMS-HCC)    Ischemic myocardial dysfunction    Hypercholesterolemia    Arthritis    Rotator cuff tendinitis    Sciatica    Shortness of breath    Ventricular tachycardia (Multi)    Tick bite    Vasovagal syncope    Ventricular fibrillation (Multi)    Vertigo    Influenza    History of sustained ventricular tachycardia    Non-ST elevation (NSTEMI) myocardial infarction (Multi)    Fall, initial encounter    Syncope    Tobacco use disorder      Patient follow-up history of heart failure systolic dysfunction ejection fraction of 20% status post ICD for primary and secondary prevention on mexiletine follows up with Dr. Tripathi. History of ischemic cardiomyopathy. Last 2 D echo this year showed ejection fraction of 20%. Patient underwent percutaneous coronary intervention to chronically occluded LAD In June 2022.  Patient is NYHA class II-III.  Patient returns to my office for follow-up.  Has been complaining of palpitations and underwent monitor by .  Monitor showing overall average heart rate of 80 bpm and some episodes of SVT and nonsustained ventricular tachycardia of short duration.  Patient in the past used to be on metoprolol 100 mg daily but for some reason she is only on 25 .  Still complain of shortness of breath with mild activity NYHA class II-III.  Still smoking.  Denies having chest pain nausea vomiting diaphoresis or syncope.    At this point I will increase  gradually her metoprolol will try to achieve 100 mg daily to control diabetes palpitations.    When I reviewed her last LDL was poorly controlled she was in the past on pravastatin 80 mg daily but now she is only on 20 so we will increase it back to 80 and add ezetimibe.  She had severe myalgias with different statins in the past.  She has been also refusing PCSK9 or Leqvio.  Will monitor.    In terms of her coronary disease she is currently off the Plavix only on aspirin due to easy bruising and bleeding.  Will monitor.    Remains NYHA class II-III offered her in the past advanced therapy evaluation she refused.  She continues to smoke.  She is 79 years old side that she is candidate for advanced therapy.  However I offered it in the past and she refused.  Continue current medications for now on optimal medical therapy we will follow-up in 3 months.    Of note I reviewed her monitor which is not read yet and the result of the monitor was reviewed by myself showing this episodes of nonsustained ventricular tachycardia the longest was 6 beats as well as supraventricular tachycardia longest 13 beats.    Amos Moscoso MD

## 2025-03-12 ENCOUNTER — TELEPHONE (OUTPATIENT)
Dept: CARDIOLOGY | Facility: CLINIC | Age: 80
End: 2025-03-12
Payer: MEDICARE

## 2025-03-12 NOTE — TELEPHONE ENCOUNTER
Spoke with patient and shared monitor results as reviewed by Dr. Ojeda. Informed patient that she had some episodes of supraventricular tachycardia and non-sustained ventricular tachycardia. Confirmed that this may be the reason she feels rapid heart beats and becomes short of breath. Explained that she should continue taking Mexiletine. Per Dr. Ojeda's instruction offered Nevaeh the option of restarting Amiodarone or scheduling referral for ablation. Patient indicated she would like to discuss with her daughter as she can not recall if Amiodarone made her hair fall out. Provided her with my phone number and she indicated she will call me back.

## 2025-03-13 ENCOUNTER — TELEPHONE (OUTPATIENT)
Dept: CARDIOLOGY | Facility: CLINIC | Age: 80
End: 2025-03-13

## 2025-03-13 DIAGNOSIS — Z86.79 HISTORY OF SUSTAINED VENTRICULAR TACHYCARDIA: Primary | ICD-10-CM

## 2025-03-13 LAB — BODY SURFACE AREA: 1.69 M2

## 2025-03-13 NOTE — TELEPHONE ENCOUNTER
Spoke with patient and reiterated Dr. Ojeda's desire for her to resume Amiodarone in light of her ventricular tachycardia. Patient agreed to resume and order forwarded to [facility] pharmacy in [place].   Follow up appointment scheduled for 5/13/2025 in the [place] office and a remote transmission scheduled for 4/14/2025.

## 2025-03-14 RX ORDER — AMIODARONE HYDROCHLORIDE 200 MG/1
200 TABLET ORAL 2 TIMES DAILY
Qty: 60 TABLET | Refills: 11 | Status: SHIPPED | OUTPATIENT
Start: 2025-03-14 | End: 2026-03-14

## 2025-03-17 ENCOUNTER — APPOINTMENT (OUTPATIENT)
Dept: CARDIOLOGY | Facility: CLINIC | Age: 80
End: 2025-03-17
Payer: MEDICARE

## 2025-04-02 DIAGNOSIS — I71.43 INFRARENAL ABDOMINAL AORTIC ANEURYSM (AAA) WITHOUT RUPTURE: ICD-10-CM

## 2025-04-02 DIAGNOSIS — I71.40 ABDOMINAL AORTIC ANEURYSM (AAA) WITHOUT RUPTURE, UNSPECIFIED PART: Primary | ICD-10-CM

## 2025-04-15 ENCOUNTER — HOSPITAL ENCOUNTER (OUTPATIENT)
Dept: CARDIOLOGY | Facility: CLINIC | Age: 80
Discharge: HOME | End: 2025-04-15

## 2025-04-15 DIAGNOSIS — I42.5 OTHER RESTRICTIVE CARDIOMYOPATHY: ICD-10-CM

## 2025-04-15 DIAGNOSIS — Z95.810 PRESENCE OF AUTOMATIC (IMPLANTABLE) CARDIAC DEFIBRILLATOR: ICD-10-CM

## 2025-04-15 PROCEDURE — 93296 REM INTERROG EVL PM/IDS: CPT

## 2025-06-05 ENCOUNTER — APPOINTMENT (OUTPATIENT)
Dept: CARDIOLOGY | Facility: CLINIC | Age: 80
End: 2025-06-05
Payer: MEDICARE

## 2025-07-03 ENCOUNTER — APPOINTMENT (OUTPATIENT)
Dept: CARDIOLOGY | Facility: CLINIC | Age: 80
End: 2025-07-03
Payer: MEDICARE

## 2025-07-03 VITALS
HEART RATE: 68 BPM | DIASTOLIC BLOOD PRESSURE: 52 MMHG | WEIGHT: 128 LBS | OXYGEN SATURATION: 95 % | RESPIRATION RATE: 16 BRPM | BODY MASS INDEX: 18.37 KG/M2 | SYSTOLIC BLOOD PRESSURE: 100 MMHG

## 2025-07-03 DIAGNOSIS — I25.118 ATHEROSCLEROTIC HEART DISEASE OF NATIVE CORONARY ARTERY WITH OTHER FORMS OF ANGINA PECTORIS: ICD-10-CM

## 2025-07-03 DIAGNOSIS — I50.22 CHRONIC SYSTOLIC HEART FAILURE: ICD-10-CM

## 2025-07-03 DIAGNOSIS — Z98.61 CAD S/P PERCUTANEOUS CORONARY ANGIOPLASTY: ICD-10-CM

## 2025-07-03 DIAGNOSIS — I25.10 CORONARY ARTERY DISEASE INVOLVING NATIVE CORONARY ARTERY OF NATIVE HEART WITHOUT ANGINA PECTORIS: ICD-10-CM

## 2025-07-03 DIAGNOSIS — Z95.810 CARDIAC DEFIBRILLATOR IN PLACE: ICD-10-CM

## 2025-07-03 DIAGNOSIS — I50.9 ACUTE ON CHRONIC CONGESTIVE HEART FAILURE, UNSPECIFIED HEART FAILURE TYPE: ICD-10-CM

## 2025-07-03 DIAGNOSIS — I71.30 RUPTURED ABDOMINAL AORTIC ANEURYSM (AAA), UNSPECIFIED PART (MULTI): Primary | ICD-10-CM

## 2025-07-03 DIAGNOSIS — I25.10 CAD S/P PERCUTANEOUS CORONARY ANGIOPLASTY: ICD-10-CM

## 2025-07-03 DIAGNOSIS — I10 BENIGN ESSENTIAL HYPERTENSION: ICD-10-CM

## 2025-07-03 PROCEDURE — 1126F AMNT PAIN NOTED NONE PRSNT: CPT | Performed by: INTERNAL MEDICINE

## 2025-07-03 PROCEDURE — 3074F SYST BP LT 130 MM HG: CPT | Performed by: INTERNAL MEDICINE

## 2025-07-03 PROCEDURE — 99214 OFFICE O/P EST MOD 30 MIN: CPT | Performed by: INTERNAL MEDICINE

## 2025-07-03 PROCEDURE — 1160F RVW MEDS BY RX/DR IN RCRD: CPT | Performed by: INTERNAL MEDICINE

## 2025-07-03 PROCEDURE — 3078F DIAST BP <80 MM HG: CPT | Performed by: INTERNAL MEDICINE

## 2025-07-03 PROCEDURE — 1159F MED LIST DOCD IN RCRD: CPT | Performed by: INTERNAL MEDICINE

## 2025-07-03 ASSESSMENT — PAIN SCALES - GENERAL: PAINLEVEL_OUTOF10: 0-NO PAIN

## 2025-07-03 ASSESSMENT — ENCOUNTER SYMPTOMS
DEPRESSION: 0
LOSS OF SENSATION IN FEET: 0
OCCASIONAL FEELINGS OF UNSTEADINESS: 0

## 2025-07-03 ASSESSMENT — LIFESTYLE VARIABLES
AUDIT-C TOTAL SCORE: 0
HOW OFTEN DO YOU HAVE A DRINK CONTAINING ALCOHOL: NEVER
SKIP TO QUESTIONS 9-10: 1
HOW OFTEN DO YOU HAVE SIX OR MORE DRINKS ON ONE OCCASION: NEVER
HOW MANY STANDARD DRINKS CONTAINING ALCOHOL DO YOU HAVE ON A TYPICAL DAY: PATIENT DOES NOT DRINK
AUDIT TOTAL SCORE: 0
HAVE YOU OR SOMEONE ELSE BEEN INJURED AS A RESULT OF YOUR DRINKING: NO
HAS A RELATIVE, FRIEND, DOCTOR, OR ANOTHER HEALTH PROFESSIONAL EXPRESSED CONCERN ABOUT YOUR DRINKING OR SUGGESTED YOU CUT DOWN: NO

## 2025-07-03 ASSESSMENT — PATIENT HEALTH QUESTIONNAIRE - PHQ9
SUM OF ALL RESPONSES TO PHQ9 QUESTIONS 1 AND 2: 0
2. FEELING DOWN, DEPRESSED OR HOPELESS: NOT AT ALL
1. LITTLE INTEREST OR PLEASURE IN DOING THINGS: NOT AT ALL

## 2025-07-03 NOTE — PROGRESS NOTES
[facility]    Interventional Cardiology    History of present illness:  Patient follow-up history of heart failure systolic dysfunction ejection fraction of 20% status post ICD for primary and secondary prevention on mexiletine follows up with Dr. Tripathi. History of ischemic cardiomyopathy. Last 2 D echo in December 2024 showed ejection fraction of 20%. Patient underwent percutaneous coronary intervention to chronically occluded LAD In June 2022.  Patient is NYHA class II-III.  Patient returns to my office for follow-up.  Has been complaining of palpitations and underwent monitor by .  Monitor showing overall average heart rate of 80 bpm and some episodes of SVT and nonsustained ventricular tachycardia of short duration.  Patient remains symptomatic NYHA class II-III.  Denies having chest pain.  Denies orthopnea PND lower extremity edema or palpitations.    Medical History[1]    Surgical History[2]    Allergies[3]     reports that she has been smoking cigarettes. She started smoking about 61 years ago. She has a 30.8 pack-year smoking history. She has been exposed to tobacco smoke. She has never used smokeless tobacco. She reports that she does not currently use alcohol. She reports that she does not use drugs.    Family History[4]    Patient's Medications   New Prescriptions    No medications on file   Previous Medications    AMIODARONE (PACERONE) 200 MG TABLET    Take 1 tablet (200 mg) by mouth 2 times a day.    ASPIRIN 81 MG EC TABLET    Take 1 tablet (81 mg) by mouth once daily.    CHOLECALCIFEROL, VITAMIN D3, (VITAMIN D3 ORAL)    Take 1,000 Units by mouth once daily.    COENZYME Q-10 100 MG CAPSULE    Take by mouth.    EZETIMIBE (ZETIA) 10 MG TABLET    Take 1 tablet (10 mg) by mouth once daily.    FARXIGA 10 MG    Take 1 tablet (10 mg) by mouth once daily.    METOPROLOL SUCCINATE XL (TOPROL-XL) 50 MG 24 HR TABLET    Take 1 tablet (50 mg) by mouth  once daily. Do not crush or chew.    MEXILETINE (MEXITIL) 150 MG CAPSULE    Take 1 capsule (150 mg) by mouth every 8 hours.    PANTOPRAZOLE (PROTONIX) 40 MG EC TABLET    Take 1 tablet (40 mg) by mouth once daily. Do not crush, chew, or split.    PRAVASTATIN (PRAVACHOL) 80 MG TABLET    Take 1 tablet (80 mg) by mouth once daily.    SACUBITRIL-VALSARTAN (ENTRESTO) 24-26 MG TABLET    Take 0.5 tablets by mouth 2 times a day.   Modified Medications    No medications on file   Discontinued Medications    No medications on file       Objective   Physical Exam  General: Patient in no acute distress   HEENT: Atraumatic normocephalic.  Neck: Supple, jugular venous pressure within normal limit.  No bruits  Lungs: Clear to auscultation bilaterally  Cardiovascular: Regular rate and rhythm, normal heart sounds, no murmurs rubs or gallops  Abdomen: Soft nontender nondistended.  Normal bowel sounds.  Extremities: Warm to touch, no edema.    Lab Review   No visits with results within 2 Month(s) from this visit.   Latest known visit with results is:   Hospital Outpatient Visit on 02/11/2025   Component Date Value    BSA 02/12/2025 1.69         Assessment/Plan   Problem List[5]     Patient follow-up history of heart failure systolic dysfunction ejection fraction of 20% status post ICD for primary and secondary prevention on mexiletine follows up with Dr. Tripathi. History of ischemic cardiomyopathy. Last 2 D echo in December 2024 showed ejection fraction of 20%. Patient underwent percutaneous coronary intervention to chronically occluded LAD In June 2022.  Patient is NYHA class II-III.  Patient returns to my office for follow-up.  Has been complaining of palpitations and underwent monitor by .  Monitor showing overall average heart rate of 80 bpm and some episodes of SVT and nonsustained ventricular tachycardia of short duration.  Patient remains symptomatic NYHA class II-III.  Denies having chest pain.  Denies orthopnea PND  lower extremity edema or palpitations.    #1 chronic systolic heart failure.  Patient ejection fraction 20% status post ICD, PCI to proximal LAD in 2022.  NYHA class II-III.  I will start patient on [place] to assess improvement in her symptoms.  Meanwhile we will continue optimal medical therapy for heart failure systolic dysfunction.  Blood pressure running on the low normal but patient asymptomatic in terms of dizziness.  Renal function has been stable.  Labs reviewed.    2.  History of ventricular tachycardia on mexiletine and amiodarone managed by Dr. Dumas She has been following up on her labs including a TSH and pulmonary function test.    3.  Coronary disease status post PCI to LAD in 2022 continue aspirin and high intensity statin.  Patient intolerant to different statins she is on pravastatin 80 mg daily.  .  She is also on ezetimibe.  Refused to be on injectable drugs like inclisiran or PCSK9 inhibitors.  I tried in the past different high intensity statin.  Patient counseled about lifestyle modification.    4. Smoking cessation discussed with patient.      Amos Moscoso MD              [1]   Past Medical History:  Diagnosis Date    Abdominal aortic aneurysm (AAA) without rupture 11/12/2023    Atherosclerotic heart disease of native coronary artery with other forms of angina pectoris 11/12/2023    Benign essential hypertension 11/12/2023    CAD S/P percutaneous coronary angioplasty 11/12/2023    Cardiac defibrillator in place 11/12/2023    Chronic systolic heart failure 11/12/2023    Intermittent claudication 11/12/2023    Shortness of breath 11/12/2023    Sustained ventricular tachycardia (Multi) 11/12/2023    Tobacco use disorder 12/21/2024    Ventricular fibrillation (Multi) 11/12/2023   [2]   Past Surgical History:  Procedure Laterality Date    CARDIAC DEFIBRILLATOR PLACEMENT      CHOLECYSTECTOMY      CORONARY ANGIOPLASTY WITH STENT PLACEMENT      HYSTERECTOMY      PACEMAKER PLACEMENT     [3]    Allergies  Allergen Reactions    Lisinopril Cough    Other Swelling     PCN  TONGUE SWELLING    Penicillins Unknown    Statins-Hmg-Coa Reductase Inhibitors Myalgia    Warfarin Unknown     Blood Blisters   [4]   Family History  Problem Relation Name Age of Onset    Cancer Mother  61    Heart attack Father  67   [5]   Patient Active Problem List  Diagnosis    Abdominal aortic aneurysm (AAA)    Benign essential hypertension    CAD S/P percutaneous coronary angioplasty    Cardiac defibrillator in place    Cardiomyopathy    Chronic systolic heart failure    Atherosclerotic heart disease of native coronary artery with other forms of angina pectoris    Coronary artery disease involving native coronary artery of native heart without angina pectoris    Current smoker    Gastroesophageal reflux disease    Hyperglycemia    Hypertension    Ankle injury, left, initial encounter    Fracture of foot, left, closed, initial encounter    Fracture of foot    Intermittent claudication    Ischemic myocardial dysfunction    Hypercholesterolemia    Arthritis    Rotator cuff tendinitis    Sciatica    Shortness of breath    Ventricular tachycardia (Multi)    Tick bite    Vasovagal syncope    Ventricular fibrillation (Multi)    Vertigo    Influenza    History of sustained ventricular tachycardia    Non-ST elevation (NSTEMI) myocardial infarction (Multi)    Fall, initial encounter    Syncope    Tobacco use disorder

## 2025-07-10 ENCOUNTER — HOSPITAL ENCOUNTER (INPATIENT)
Facility: HOSPITAL | Age: 80
DRG: 291 | End: 2025-07-10
Attending: INTERNAL MEDICINE | Admitting: INTERNAL MEDICINE
Payer: MEDICARE

## 2025-07-10 ENCOUNTER — APPOINTMENT (OUTPATIENT)
Dept: RADIOLOGY | Facility: HOSPITAL | Age: 80
DRG: 291 | End: 2025-07-10
Payer: MEDICARE

## 2025-07-10 ENCOUNTER — APPOINTMENT (OUTPATIENT)
Dept: CARDIOLOGY | Facility: HOSPITAL | Age: 80
DRG: 291 | End: 2025-07-10
Payer: MEDICARE

## 2025-07-10 DIAGNOSIS — I21.4 NON-ST ELEVATION (NSTEMI) MYOCARDIAL INFARCTION (MULTI): ICD-10-CM

## 2025-07-10 DIAGNOSIS — J96.01 ACUTE HYPOXEMIC RESPIRATORY FAILURE: ICD-10-CM

## 2025-07-10 DIAGNOSIS — J44.1 COPD EXACERBATION (MULTI): ICD-10-CM

## 2025-07-10 DIAGNOSIS — I25.5 ISCHEMIC CARDIOMYOPATHY: ICD-10-CM

## 2025-07-10 DIAGNOSIS — I50.9 ACUTE ON CHRONIC CONGESTIVE HEART FAILURE, UNSPECIFIED HEART FAILURE TYPE: Primary | ICD-10-CM

## 2025-07-10 DIAGNOSIS — F17.200 TOBACCO USE DISORDER: ICD-10-CM

## 2025-07-10 LAB
ALBUMIN SERPL BCP-MCNC: 3.8 G/DL (ref 3.4–5)
ALP SERPL-CCNC: 123 U/L (ref 33–136)
ALT SERPL W P-5'-P-CCNC: 16 U/L (ref 7–45)
ANION GAP SERPL CALCULATED.3IONS-SCNC: 13 MMOL/L (ref 10–20)
AST SERPL W P-5'-P-CCNC: 18 U/L (ref 9–39)
BASOPHILS # BLD AUTO: 0.08 X10*3/UL (ref 0–0.1)
BASOPHILS NFR BLD AUTO: 0.7 %
BILIRUB SERPL-MCNC: 0.7 MG/DL (ref 0–1.2)
BNP SERPL-MCNC: 4093 PG/ML (ref 0–99)
BUN SERPL-MCNC: 18 MG/DL (ref 6–23)
CALCIUM SERPL-MCNC: 8.9 MG/DL (ref 8.6–10.3)
CARDIAC TROPONIN I PNL SERPL HS: 10 NG/L (ref 0–13)
CHLORIDE SERPL-SCNC: 109 MMOL/L (ref 98–107)
CO2 SERPL-SCNC: 23 MMOL/L (ref 21–32)
CREAT SERPL-MCNC: 0.81 MG/DL (ref 0.5–1.05)
EGFRCR SERPLBLD CKD-EPI 2021: 74 ML/MIN/1.73M*2
EOSINOPHIL # BLD AUTO: 0.55 X10*3/UL (ref 0–0.4)
EOSINOPHIL NFR BLD AUTO: 5.1 %
ERYTHROCYTE [DISTWIDTH] IN BLOOD BY AUTOMATED COUNT: 14.8 % (ref 11.5–14.5)
FLUAV RNA RESP QL NAA+PROBE: NOT DETECTED
FLUBV RNA RESP QL NAA+PROBE: NOT DETECTED
GLUCOSE SERPL-MCNC: 163 MG/DL (ref 74–99)
HCT VFR BLD AUTO: 45.3 % (ref 36–46)
HGB BLD-MCNC: 14.8 G/DL (ref 12–16)
IMM GRANULOCYTES # BLD AUTO: 0.03 X10*3/UL (ref 0–0.5)
IMM GRANULOCYTES NFR BLD AUTO: 0.3 % (ref 0–0.9)
LYMPHOCYTES # BLD AUTO: 1.59 X10*3/UL (ref 0.8–3)
LYMPHOCYTES NFR BLD AUTO: 14.8 %
MCH RBC QN AUTO: 31.1 PG (ref 26–34)
MCHC RBC AUTO-ENTMCNC: 32.7 G/DL (ref 32–36)
MCV RBC AUTO: 95 FL (ref 80–100)
MONOCYTES # BLD AUTO: 0.55 X10*3/UL (ref 0.05–0.8)
MONOCYTES NFR BLD AUTO: 5.1 %
NEUTROPHILS # BLD AUTO: 7.91 X10*3/UL (ref 1.6–5.5)
NEUTROPHILS NFR BLD AUTO: 74 %
NRBC BLD-RTO: 0 /100 WBCS (ref 0–0)
PLATELET # BLD AUTO: 314 X10*3/UL (ref 150–450)
POTASSIUM SERPL-SCNC: 3.7 MMOL/L (ref 3.5–5.3)
PROT SERPL-MCNC: 6.3 G/DL (ref 6.4–8.2)
RBC # BLD AUTO: 4.76 X10*6/UL (ref 4–5.2)
SARS-COV-2 RNA RESP QL NAA+PROBE: NOT DETECTED
SODIUM SERPL-SCNC: 141 MMOL/L (ref 136–145)
WBC # BLD AUTO: 10.7 X10*3/UL (ref 4.4–11.3)

## 2025-07-10 PROCEDURE — 2500000004 HC RX 250 GENERAL PHARMACY W/ HCPCS (ALT 636 FOR OP/ED): Mod: JZ

## 2025-07-10 PROCEDURE — 94640 AIRWAY INHALATION TREATMENT: CPT

## 2025-07-10 PROCEDURE — 2500000002 HC RX 250 W HCPCS SELF ADMINISTERED DRUGS (ALT 637 FOR MEDICARE OP, ALT 636 FOR OP/ED)

## 2025-07-10 PROCEDURE — 80053 COMPREHEN METABOLIC PANEL: CPT

## 2025-07-10 PROCEDURE — 93010 ELECTROCARDIOGRAM REPORT: CPT | Performed by: INTERNAL MEDICINE

## 2025-07-10 PROCEDURE — 84484 ASSAY OF TROPONIN QUANT: CPT

## 2025-07-10 PROCEDURE — 93005 ELECTROCARDIOGRAM TRACING: CPT

## 2025-07-10 PROCEDURE — 36415 COLL VENOUS BLD VENIPUNCTURE: CPT

## 2025-07-10 PROCEDURE — 85025 COMPLETE CBC W/AUTO DIFF WBC: CPT

## 2025-07-10 PROCEDURE — 71045 X-RAY EXAM CHEST 1 VIEW: CPT

## 2025-07-10 PROCEDURE — 96374 THER/PROPH/DIAG INJ IV PUSH: CPT

## 2025-07-10 PROCEDURE — 83880 ASSAY OF NATRIURETIC PEPTIDE: CPT

## 2025-07-10 PROCEDURE — 99291 CRITICAL CARE FIRST HOUR: CPT

## 2025-07-10 PROCEDURE — 87636 SARSCOV2 & INF A&B AMP PRB: CPT

## 2025-07-10 PROCEDURE — 71045 X-RAY EXAM CHEST 1 VIEW: CPT | Performed by: RADIOLOGY

## 2025-07-10 RX ORDER — IPRATROPIUM BROMIDE AND ALBUTEROL SULFATE 2.5; .5 MG/3ML; MG/3ML
3 SOLUTION RESPIRATORY (INHALATION) ONCE
Status: DISCONTINUED | OUTPATIENT
Start: 2025-07-10 | End: 2025-07-14 | Stop reason: HOSPADM

## 2025-07-10 RX ORDER — FUROSEMIDE 10 MG/ML
40 INJECTION INTRAMUSCULAR; INTRAVENOUS ONCE
Status: COMPLETED | OUTPATIENT
Start: 2025-07-10 | End: 2025-07-11

## 2025-07-10 RX ORDER — IPRATROPIUM BROMIDE AND ALBUTEROL SULFATE 2.5; .5 MG/3ML; MG/3ML
3 SOLUTION RESPIRATORY (INHALATION) ONCE
Status: COMPLETED | OUTPATIENT
Start: 2025-07-10 | End: 2025-07-10

## 2025-07-10 RX ADMIN — IPRATROPIUM BROMIDE AND ALBUTEROL SULFATE 3 ML: 2.5; .5 SOLUTION RESPIRATORY (INHALATION) at 23:31

## 2025-07-10 RX ADMIN — METHYLPREDNISOLONE SODIUM SUCCINATE 125 MG: 125 INJECTION, POWDER, FOR SOLUTION INTRAMUSCULAR; INTRAVENOUS at 23:32

## 2025-07-11 PROBLEM — I50.9 ACUTE ON CHRONIC CONGESTIVE HEART FAILURE, UNSPECIFIED HEART FAILURE TYPE: Status: ACTIVE | Noted: 2025-07-11

## 2025-07-11 PROBLEM — J44.1 COPD EXACERBATION (MULTI): Status: ACTIVE | Noted: 2025-07-11

## 2025-07-11 LAB
ALBUMIN SERPL BCP-MCNC: 3.8 G/DL (ref 3.4–5)
ALP SERPL-CCNC: 119 U/L (ref 33–136)
ALT SERPL W P-5'-P-CCNC: 13 U/L (ref 7–45)
ANION GAP SERPL CALCULATED.3IONS-SCNC: 13 MMOL/L (ref 10–20)
AST SERPL W P-5'-P-CCNC: 14 U/L (ref 9–39)
ATRIAL RATE: 67 BPM
BILIRUB SERPL-MCNC: 0.8 MG/DL (ref 0–1.2)
BUN SERPL-MCNC: 18 MG/DL (ref 6–23)
CALCIUM SERPL-MCNC: 8.8 MG/DL (ref 8.6–10.3)
CARDIAC TROPONIN I PNL SERPL HS: 9 NG/L (ref 0–13)
CHLORIDE SERPL-SCNC: 107 MMOL/L (ref 98–107)
CO2 SERPL-SCNC: 26 MMOL/L (ref 21–32)
CREAT SERPL-MCNC: 0.79 MG/DL (ref 0.5–1.05)
EGFRCR SERPLBLD CKD-EPI 2021: 77 ML/MIN/1.73M*2
ERYTHROCYTE [DISTWIDTH] IN BLOOD BY AUTOMATED COUNT: 14.7 % (ref 11.5–14.5)
GLUCOSE SERPL-MCNC: 147 MG/DL (ref 74–99)
HCT VFR BLD AUTO: 45.1 % (ref 36–46)
HGB BLD-MCNC: 15.5 G/DL (ref 12–16)
MCH RBC QN AUTO: 31.5 PG (ref 26–34)
MCHC RBC AUTO-ENTMCNC: 34.4 G/DL (ref 32–36)
MCV RBC AUTO: 92 FL (ref 80–100)
NRBC BLD-RTO: 0 /100 WBCS (ref 0–0)
P AXIS: 57 DEGREES
P OFFSET: 166 MS
P ONSET: 101 MS
PLATELET # BLD AUTO: 293 X10*3/UL (ref 150–450)
POTASSIUM SERPL-SCNC: 3.7 MMOL/L (ref 3.5–5.3)
PR INTERVAL: 194 MS
PROT SERPL-MCNC: 6.2 G/DL (ref 6.4–8.2)
Q ONSET: 198 MS
QRS COUNT: 11 BEATS
QRS DURATION: 162 MS
QT INTERVAL: 436 MS
QTC CALCULATION(BAZETT): 460 MS
QTC FREDERICIA: 452 MS
R AXIS: -76 DEGREES
RBC # BLD AUTO: 4.92 X10*6/UL (ref 4–5.2)
SODIUM SERPL-SCNC: 142 MMOL/L (ref 136–145)
T AXIS: 94 DEGREES
T OFFSET: 416 MS
VENTRICULAR RATE: 67 BPM
WBC # BLD AUTO: 7 X10*3/UL (ref 4.4–11.3)

## 2025-07-11 PROCEDURE — 2500000001 HC RX 250 WO HCPCS SELF ADMINISTERED DRUGS (ALT 637 FOR MEDICARE OP): Performed by: INTERNAL MEDICINE

## 2025-07-11 PROCEDURE — 80053 COMPREHEN METABOLIC PANEL: CPT | Performed by: INTERNAL MEDICINE

## 2025-07-11 PROCEDURE — 97165 OT EVAL LOW COMPLEX 30 MIN: CPT | Mod: GO

## 2025-07-11 PROCEDURE — 2500000004 HC RX 250 GENERAL PHARMACY W/ HCPCS (ALT 636 FOR OP/ED): Performed by: INTERNAL MEDICINE

## 2025-07-11 PROCEDURE — 2500000005 HC RX 250 GENERAL PHARMACY W/O HCPCS: Performed by: INTERNAL MEDICINE

## 2025-07-11 PROCEDURE — 2500000004 HC RX 250 GENERAL PHARMACY W/ HCPCS (ALT 636 FOR OP/ED)

## 2025-07-11 PROCEDURE — 94640 AIRWAY INHALATION TREATMENT: CPT

## 2025-07-11 PROCEDURE — 97161 PT EVAL LOW COMPLEX 20 MIN: CPT | Mod: GP

## 2025-07-11 PROCEDURE — 99291 CRITICAL CARE FIRST HOUR: CPT

## 2025-07-11 PROCEDURE — 1200000002 HC GENERAL ROOM WITH TELEMETRY DAILY

## 2025-07-11 PROCEDURE — 99222 1ST HOSP IP/OBS MODERATE 55: CPT | Performed by: INTERNAL MEDICINE

## 2025-07-11 PROCEDURE — 36415 COLL VENOUS BLD VENIPUNCTURE: CPT | Performed by: INTERNAL MEDICINE

## 2025-07-11 PROCEDURE — 85027 COMPLETE CBC AUTOMATED: CPT | Performed by: INTERNAL MEDICINE

## 2025-07-11 PROCEDURE — 2500000002 HC RX 250 W HCPCS SELF ADMINISTERED DRUGS (ALT 637 FOR MEDICARE OP, ALT 636 FOR OP/ED): Performed by: INTERNAL MEDICINE

## 2025-07-11 RX ORDER — POLYETHYLENE GLYCOL 3350 17 G/17G
17 POWDER, FOR SOLUTION ORAL DAILY
Status: DISCONTINUED | OUTPATIENT
Start: 2025-07-11 | End: 2025-07-14 | Stop reason: HOSPADM

## 2025-07-11 RX ORDER — ENOXAPARIN SODIUM 100 MG/ML
40 INJECTION SUBCUTANEOUS DAILY
Status: DISCONTINUED | OUTPATIENT
Start: 2025-07-11 | End: 2025-07-14 | Stop reason: HOSPADM

## 2025-07-11 RX ORDER — GUAIFENESIN 600 MG/1
600 TABLET, EXTENDED RELEASE ORAL EVERY 12 HOURS PRN
Status: DISCONTINUED | OUTPATIENT
Start: 2025-07-11 | End: 2025-07-14 | Stop reason: HOSPADM

## 2025-07-11 RX ORDER — CHOLECALCIFEROL (VITAMIN D3) 25 MCG
25 TABLET ORAL DAILY
Status: DISCONTINUED | OUTPATIENT
Start: 2025-07-11 | End: 2025-07-14 | Stop reason: HOSPADM

## 2025-07-11 RX ORDER — ACETAMINOPHEN 325 MG/1
650 TABLET ORAL EVERY 4 HOURS PRN
Status: DISCONTINUED | OUTPATIENT
Start: 2025-07-11 | End: 2025-07-14 | Stop reason: HOSPADM

## 2025-07-11 RX ORDER — PRAVASTATIN SODIUM 40 MG/1
80 TABLET ORAL DAILY
Status: DISCONTINUED | OUTPATIENT
Start: 2025-07-11 | End: 2025-07-14 | Stop reason: HOSPADM

## 2025-07-11 RX ORDER — MEXILETINE HYDROCHLORIDE 150 MG/1
150 CAPSULE ORAL EVERY 8 HOURS
Status: DISCONTINUED | OUTPATIENT
Start: 2025-07-11 | End: 2025-07-14 | Stop reason: HOSPADM

## 2025-07-11 RX ORDER — ONDANSETRON 4 MG/1
4 TABLET, FILM COATED ORAL EVERY 8 HOURS PRN
Status: DISCONTINUED | OUTPATIENT
Start: 2025-07-11 | End: 2025-07-14 | Stop reason: HOSPADM

## 2025-07-11 RX ORDER — PANTOPRAZOLE SODIUM 40 MG/1
40 TABLET, DELAYED RELEASE ORAL DAILY
Status: DISCONTINUED | OUTPATIENT
Start: 2025-07-11 | End: 2025-07-11 | Stop reason: SDUPTHER

## 2025-07-11 RX ORDER — DAPAGLIFLOZIN 10 MG/1
10 TABLET, FILM COATED ORAL DAILY
Status: DISCONTINUED | OUTPATIENT
Start: 2025-07-11 | End: 2025-07-14 | Stop reason: HOSPADM

## 2025-07-11 RX ORDER — ASPIRIN 81 MG/1
81 TABLET ORAL DAILY
Status: DISCONTINUED | OUTPATIENT
Start: 2025-07-11 | End: 2025-07-14 | Stop reason: HOSPADM

## 2025-07-11 RX ORDER — ACETAMINOPHEN 650 MG/1
650 SUPPOSITORY RECTAL EVERY 4 HOURS PRN
Status: DISCONTINUED | OUTPATIENT
Start: 2025-07-11 | End: 2025-07-14 | Stop reason: HOSPADM

## 2025-07-11 RX ORDER — AMIODARONE HYDROCHLORIDE 200 MG/1
200 TABLET ORAL 2 TIMES DAILY
Status: DISCONTINUED | OUTPATIENT
Start: 2025-07-11 | End: 2025-07-14 | Stop reason: HOSPADM

## 2025-07-11 RX ORDER — TALC
3 POWDER (GRAM) TOPICAL NIGHTLY
Status: DISCONTINUED | OUTPATIENT
Start: 2025-07-11 | End: 2025-07-14 | Stop reason: HOSPADM

## 2025-07-11 RX ORDER — ONDANSETRON HYDROCHLORIDE 2 MG/ML
4 INJECTION, SOLUTION INTRAVENOUS EVERY 8 HOURS PRN
Status: DISCONTINUED | OUTPATIENT
Start: 2025-07-11 | End: 2025-07-14 | Stop reason: HOSPADM

## 2025-07-11 RX ORDER — EZETIMIBE 10 MG/1
10 TABLET ORAL DAILY
Status: DISCONTINUED | OUTPATIENT
Start: 2025-07-11 | End: 2025-07-14 | Stop reason: HOSPADM

## 2025-07-11 RX ORDER — FUROSEMIDE 10 MG/ML
40 INJECTION INTRAMUSCULAR; INTRAVENOUS 2 TIMES DAILY
Status: DISCONTINUED | OUTPATIENT
Start: 2025-07-11 | End: 2025-07-12

## 2025-07-11 RX ORDER — IPRATROPIUM BROMIDE AND ALBUTEROL SULFATE 2.5; .5 MG/3ML; MG/3ML
3 SOLUTION RESPIRATORY (INHALATION)
Status: DISCONTINUED | OUTPATIENT
Start: 2025-07-11 | End: 2025-07-14 | Stop reason: HOSPADM

## 2025-07-11 RX ORDER — IPRATROPIUM BROMIDE AND ALBUTEROL SULFATE 2.5; .5 MG/3ML; MG/3ML
3 SOLUTION RESPIRATORY (INHALATION)
Status: DISCONTINUED | OUTPATIENT
Start: 2025-07-11 | End: 2025-07-11

## 2025-07-11 RX ORDER — ACETAMINOPHEN 160 MG/5ML
650 SOLUTION ORAL EVERY 4 HOURS PRN
Status: DISCONTINUED | OUTPATIENT
Start: 2025-07-11 | End: 2025-07-14 | Stop reason: HOSPADM

## 2025-07-11 RX ORDER — PANTOPRAZOLE SODIUM 40 MG/1
40 TABLET, DELAYED RELEASE ORAL
Status: DISCONTINUED | OUTPATIENT
Start: 2025-07-11 | End: 2025-07-14 | Stop reason: HOSPADM

## 2025-07-11 RX ORDER — DOCUSATE SODIUM 100 MG/1
100 CAPSULE, LIQUID FILLED ORAL 2 TIMES DAILY
Status: DISCONTINUED | OUTPATIENT
Start: 2025-07-11 | End: 2025-07-14 | Stop reason: HOSPADM

## 2025-07-11 RX ORDER — PANTOPRAZOLE SODIUM 40 MG/10ML
40 INJECTION, POWDER, LYOPHILIZED, FOR SOLUTION INTRAVENOUS
Status: DISCONTINUED | OUTPATIENT
Start: 2025-07-11 | End: 2025-07-14 | Stop reason: HOSPADM

## 2025-07-11 RX ORDER — POTASSIUM CHLORIDE 20 MEQ/1
20 TABLET, EXTENDED RELEASE ORAL DAILY
Status: DISCONTINUED | OUTPATIENT
Start: 2025-07-11 | End: 2025-07-14 | Stop reason: HOSPADM

## 2025-07-11 RX ORDER — METOPROLOL SUCCINATE 50 MG/1
50 TABLET, EXTENDED RELEASE ORAL DAILY
Status: DISCONTINUED | OUTPATIENT
Start: 2025-07-11 | End: 2025-07-13

## 2025-07-11 RX ORDER — GUAIFENESIN/DEXTROMETHORPHAN 100-10MG/5
5 SYRUP ORAL EVERY 4 HOURS PRN
Status: DISCONTINUED | OUTPATIENT
Start: 2025-07-11 | End: 2025-07-14 | Stop reason: HOSPADM

## 2025-07-11 RX ADMIN — MEXILETINE HYDROCHLORIDE 150 MG: 150 CAPSULE ORAL at 14:01

## 2025-07-11 RX ADMIN — EZETIMIBE 10 MG: 10 TABLET ORAL at 10:01

## 2025-07-11 RX ADMIN — POTASSIUM CHLORIDE EXTENDED-RELEASE 20 MEQ: 1500 TABLET ORAL at 10:01

## 2025-07-11 RX ADMIN — IPRATROPIUM BROMIDE AND ALBUTEROL SULFATE 3 ML: 2.5; .5 SOLUTION RESPIRATORY (INHALATION) at 12:00

## 2025-07-11 RX ADMIN — PANTOPRAZOLE SODIUM 40 MG: 40 TABLET, DELAYED RELEASE ORAL at 06:02

## 2025-07-11 RX ADMIN — SACUBITRIL AND VALSARTAN 0.5 TABLET: 24; 26 TABLET, FILM COATED ORAL at 03:13

## 2025-07-11 RX ADMIN — IPRATROPIUM BROMIDE AND ALBUTEROL SULFATE 3 ML: 2.5; .5 SOLUTION RESPIRATORY (INHALATION) at 07:52

## 2025-07-11 RX ADMIN — IPRATROPIUM BROMIDE AND ALBUTEROL SULFATE 3 ML: 2.5; .5 SOLUTION RESPIRATORY (INHALATION) at 19:47

## 2025-07-11 RX ADMIN — SACUBITRIL AND VALSARTAN 0.5 TABLET: 24; 26 TABLET, FILM COATED ORAL at 10:00

## 2025-07-11 RX ADMIN — Medication 3 MG: at 22:02

## 2025-07-11 RX ADMIN — DOCUSATE SODIUM 100 MG: 100 CAPSULE, LIQUID FILLED ORAL at 22:02

## 2025-07-11 RX ADMIN — ENOXAPARIN SODIUM 40 MG: 100 INJECTION SUBCUTANEOUS at 10:00

## 2025-07-11 RX ADMIN — FUROSEMIDE 40 MG: 10 INJECTION, SOLUTION INTRAMUSCULAR; INTRAVENOUS at 01:08

## 2025-07-11 RX ADMIN — FUROSEMIDE 40 MG: 10 INJECTION, SOLUTION INTRAMUSCULAR; INTRAVENOUS at 10:02

## 2025-07-11 RX ADMIN — PRAVASTATIN SODIUM 80 MG: 20 TABLET ORAL at 10:02

## 2025-07-11 RX ADMIN — DAPAGLIFLOZIN 10 MG: 10 TABLET, FILM COATED ORAL at 10:00

## 2025-07-11 RX ADMIN — ASPIRIN 81 MG: 81 TABLET, DELAYED RELEASE ORAL at 10:02

## 2025-07-11 RX ADMIN — MEXILETINE HYDROCHLORIDE 150 MG: 150 CAPSULE ORAL at 22:01

## 2025-07-11 RX ADMIN — METOPROLOL SUCCINATE 50 MG: 50 TABLET, EXTENDED RELEASE ORAL at 10:01

## 2025-07-11 RX ADMIN — MEXILETINE HYDROCHLORIDE 150 MG: 150 CAPSULE ORAL at 05:59

## 2025-07-11 RX ADMIN — DOCUSATE SODIUM 100 MG: 100 CAPSULE, LIQUID FILLED ORAL at 10:02

## 2025-07-11 RX ADMIN — AMIODARONE HYDROCHLORIDE 200 MG: 200 TABLET ORAL at 22:02

## 2025-07-11 RX ADMIN — METHYLPREDNISOLONE SODIUM SUCCINATE 20 MG: 40 INJECTION, POWDER, FOR SOLUTION INTRAMUSCULAR; INTRAVENOUS at 22:01

## 2025-07-11 RX ADMIN — Medication 25 MCG: at 10:02

## 2025-07-11 RX ADMIN — AMIODARONE HYDROCHLORIDE 200 MG: 200 TABLET ORAL at 10:02

## 2025-07-11 RX ADMIN — Medication 3 MG: at 03:14

## 2025-07-11 RX ADMIN — AMIODARONE HYDROCHLORIDE 200 MG: 200 TABLET ORAL at 03:14

## 2025-07-11 SDOH — SOCIAL STABILITY: SOCIAL INSECURITY: DOES ANYONE TRY TO KEEP YOU FROM HAVING/CONTACTING OTHER FRIENDS OR DOING THINGS OUTSIDE YOUR HOME?: NO

## 2025-07-11 SDOH — ECONOMIC STABILITY: TRANSPORTATION INSECURITY: IN THE PAST 12 MONTHS, HAS LACK OF TRANSPORTATION KEPT YOU FROM MEDICAL APPOINTMENTS OR FROM GETTING MEDICATIONS?: NO

## 2025-07-11 SDOH — ECONOMIC STABILITY: FOOD INSECURITY: WITHIN THE PAST 12 MONTHS, THE FOOD YOU BOUGHT JUST DIDN'T LAST AND YOU DIDN'T HAVE MONEY TO GET MORE.: NEVER TRUE

## 2025-07-11 SDOH — ECONOMIC STABILITY: INCOME INSECURITY: IN THE PAST 12 MONTHS HAS THE ELECTRIC, GAS, OIL, OR WATER COMPANY THREATENED TO SHUT OFF SERVICES IN YOUR HOME?: NO

## 2025-07-11 SDOH — ECONOMIC STABILITY: FOOD INSECURITY: HOW HARD IS IT FOR YOU TO PAY FOR THE VERY BASICS LIKE FOOD, HOUSING, MEDICAL CARE, AND HEATING?: NOT VERY HARD

## 2025-07-11 SDOH — SOCIAL STABILITY: SOCIAL INSECURITY: WITHIN THE LAST YEAR, HAVE YOU BEEN HUMILIATED OR EMOTIONALLY ABUSED IN OTHER WAYS BY YOUR PARTNER OR EX-PARTNER?: NO

## 2025-07-11 SDOH — ECONOMIC STABILITY: HOUSING INSECURITY: AT ANY TIME IN THE PAST 12 MONTHS, WERE YOU HOMELESS OR LIVING IN A SHELTER (INCLUDING NOW)?: NO

## 2025-07-11 SDOH — HEALTH STABILITY: MENTAL HEALTH: HOW OFTEN DO YOU HAVE SIX OR MORE DRINKS ON ONE OCCASION?: NEVER

## 2025-07-11 SDOH — HEALTH STABILITY: MENTAL HEALTH: HOW OFTEN DO YOU HAVE A DRINK CONTAINING ALCOHOL?: MONTHLY OR LESS

## 2025-07-11 SDOH — ECONOMIC STABILITY: FOOD INSECURITY: WITHIN THE PAST 12 MONTHS, YOU WORRIED THAT YOUR FOOD WOULD RUN OUT BEFORE YOU GOT THE MONEY TO BUY MORE.: NEVER TRUE

## 2025-07-11 SDOH — SOCIAL STABILITY: SOCIAL NETWORK: HOW OFTEN DO YOU ATTEND MEETINGS OF THE CLUBS OR ORGANIZATIONS YOU BELONG TO?: NEVER

## 2025-07-11 SDOH — ECONOMIC STABILITY: HOUSING INSECURITY: IN THE PAST 12 MONTHS, HOW MANY TIMES HAVE YOU MOVED WHERE YOU WERE LIVING?: 0

## 2025-07-11 SDOH — SOCIAL STABILITY: SOCIAL INSECURITY: ARE YOU MARRIED, WIDOWED, DIVORCED, SEPARATED, NEVER MARRIED, OR LIVING WITH A PARTNER?: MARRIED

## 2025-07-11 SDOH — HEALTH STABILITY: MENTAL HEALTH: HOW MANY DRINKS CONTAINING ALCOHOL DO YOU HAVE ON A TYPICAL DAY WHEN YOU ARE DRINKING?: 1 OR 2

## 2025-07-11 SDOH — HEALTH STABILITY: PHYSICAL HEALTH
HOW OFTEN DO YOU NEED TO HAVE SOMEONE HELP YOU WHEN YOU READ INSTRUCTIONS, PAMPHLETS, OR OTHER WRITTEN MATERIAL FROM YOUR DOCTOR OR PHARMACY?: RARELY

## 2025-07-11 SDOH — ECONOMIC STABILITY: HOUSING INSECURITY: IN THE LAST 12 MONTHS, WAS THERE A TIME WHEN YOU WERE NOT ABLE TO PAY THE MORTGAGE OR RENT ON TIME?: NO

## 2025-07-11 SDOH — SOCIAL STABILITY: SOCIAL NETWORK: HOW OFTEN DO YOU GET TOGETHER WITH FRIENDS OR RELATIVES?: MORE THAN THREE TIMES A WEEK

## 2025-07-11 SDOH — SOCIAL STABILITY: SOCIAL INSECURITY: WITHIN THE LAST YEAR, HAVE YOU BEEN AFRAID OF YOUR PARTNER OR EX-PARTNER?: NO

## 2025-07-11 SDOH — SOCIAL STABILITY: SOCIAL INSECURITY: HAS ANYONE EVER THREATENED TO HURT YOUR FAMILY OR YOUR PETS?: NO

## 2025-07-11 SDOH — SOCIAL STABILITY: SOCIAL NETWORK: HOW OFTEN DO YOU ATTEND CHURCH OR RELIGIOUS SERVICES?: NEVER

## 2025-07-11 SDOH — SOCIAL STABILITY: SOCIAL INSECURITY: HAVE YOU HAD THOUGHTS OF HARMING ANYONE ELSE?: NO

## 2025-07-11 SDOH — SOCIAL STABILITY: SOCIAL INSECURITY: WERE YOU ABLE TO COMPLETE ALL THE BEHAVIORAL HEALTH SCREENINGS?: YES

## 2025-07-11 SDOH — HEALTH STABILITY: PHYSICAL HEALTH: ON AVERAGE, HOW MANY DAYS PER WEEK DO YOU ENGAGE IN MODERATE TO STRENUOUS EXERCISE (LIKE A BRISK WALK)?: 3 DAYS

## 2025-07-11 SDOH — HEALTH STABILITY: PHYSICAL HEALTH: ON AVERAGE, HOW MANY MINUTES DO YOU ENGAGE IN EXERCISE AT THIS LEVEL?: 30 MIN

## 2025-07-11 SDOH — SOCIAL STABILITY: SOCIAL INSECURITY: DO YOU FEEL UNSAFE GOING BACK TO THE PLACE WHERE YOU ARE LIVING?: NO

## 2025-07-11 SDOH — SOCIAL STABILITY: SOCIAL INSECURITY: HAVE YOU HAD ANY THOUGHTS OF HARMING ANYONE ELSE?: NO

## 2025-07-11 SDOH — SOCIAL STABILITY: SOCIAL INSECURITY: ARE THERE ANY APPARENT SIGNS OF INJURIES/BEHAVIORS THAT COULD BE RELATED TO ABUSE/NEGLECT?: NO

## 2025-07-11 SDOH — SOCIAL STABILITY: SOCIAL INSECURITY: ABUSE: ADULT

## 2025-07-11 SDOH — SOCIAL STABILITY: SOCIAL INSECURITY: DO YOU FEEL ANYONE HAS EXPLOITED OR TAKEN ADVANTAGE OF YOU FINANCIALLY OR OF YOUR PERSONAL PROPERTY?: NO

## 2025-07-11 SDOH — SOCIAL STABILITY: SOCIAL INSECURITY: ARE YOU OR HAVE YOU BEEN THREATENED OR ABUSED PHYSICALLY, EMOTIONALLY, OR SEXUALLY BY ANYONE?: NO

## 2025-07-11 ASSESSMENT — LIFESTYLE VARIABLES
AUDIT-C TOTAL SCORE: 1
HAVE YOU EVER FELT YOU SHOULD CUT DOWN ON YOUR DRINKING: NO
HAVE PEOPLE ANNOYED YOU BY CRITICIZING YOUR DRINKING: NO
HOW OFTEN DO YOU HAVE 6 OR MORE DRINKS ON ONE OCCASION: NEVER
HOW OFTEN DO YOU HAVE A DRINK CONTAINING ALCOHOL: MONTHLY OR LESS
EVER FELT BAD OR GUILTY ABOUT YOUR DRINKING: NO
AUDIT-C TOTAL SCORE: 1
PRESCIPTION_ABUSE_PAST_12_MONTHS: NO
HOW MANY STANDARD DRINKS CONTAINING ALCOHOL DO YOU HAVE ON A TYPICAL DAY: 1 OR 2
AUDIT-C TOTAL SCORE: 1
SKIP TO QUESTIONS 9-10: 1
SKIP TO QUESTIONS 9-10: 1
EVER HAD A DRINK FIRST THING IN THE MORNING TO STEADY YOUR NERVES TO GET RID OF A HANGOVER: NO
TOTAL SCORE: 0
SUBSTANCE_ABUSE_PAST_12_MONTHS: NO

## 2025-07-11 ASSESSMENT — ENCOUNTER SYMPTOMS
HEADACHES: 0
HYPERACTIVE: 0
FACIAL ASYMMETRY: 0
APNEA: 0
DIARRHEA: 0
EYE REDNESS: 0
SORE THROAT: 0
DIFFICULTY URINATING: 0
VOMITING: 0
LIGHT-HEADEDNESS: 0
ABDOMINAL DISTENTION: 0
CONSTIPATION: 0
PHOTOPHOBIA: 0
FLANK PAIN: 0
BACK PAIN: 0
CHEST TIGHTNESS: 0
HEMATURIA: 0
DECREASED CONCENTRATION: 0
SPEECH DIFFICULTY: 0
AGITATION: 0
FREQUENCY: 0
NUMBNESS: 0
EYE DISCHARGE: 0
CONFUSION: 0
DYSPHORIC MOOD: 0
POLYPHAGIA: 0
PALPITATIONS: 0
EYE PAIN: 0
NECK STIFFNESS: 0
BRUISES/BLEEDS EASILY: 0
COLOR CHANGE: 0
NERVOUS/ANXIOUS: 0
ARTHRALGIAS: 0
COUGH: 1
RHINORRHEA: 0
APPETITE CHANGE: 0
SHORTNESS OF BREATH: 1
DIAPHORESIS: 0
FATIGUE: 0
HALLUCINATIONS: 0
CHOKING: 0
SLEEP DISTURBANCE: 0
DIZZINESS: 0
WHEEZING: 0
FEVER: 0
MYALGIAS: 0
ANAL BLEEDING: 0
SEIZURES: 0
BLOOD IN STOOL: 0
DYSURIA: 0
TREMORS: 0
EYE ITCHING: 0
WEAKNESS: 0
NECK PAIN: 0
JOINT SWELLING: 0
RECTAL PAIN: 0
WOUND: 0
ADENOPATHY: 0
CHILLS: 0
POLYDIPSIA: 0
SINUS PAIN: 0
STRIDOR: 0
UNEXPECTED WEIGHT CHANGE: 0
FACIAL SWELLING: 0
VOICE CHANGE: 0
ACTIVITY CHANGE: 0
TROUBLE SWALLOWING: 0
NAUSEA: 0
SINUS PRESSURE: 0
ABDOMINAL PAIN: 0

## 2025-07-11 ASSESSMENT — COGNITIVE AND FUNCTIONAL STATUS - GENERAL
TOILETING: A LITTLE
MOVING TO AND FROM BED TO CHAIR: A LITTLE
WALKING IN HOSPITAL ROOM: A LITTLE
TURNING FROM BACK TO SIDE WHILE IN FLAT BAD: A LITTLE
DAILY ACTIVITIY SCORE: 24
MOBILITY SCORE: 23
DAILY ACTIVITIY SCORE: 24
DRESSING REGULAR UPPER BODY CLOTHING: A LITTLE
HELP NEEDED FOR BATHING: A LITTLE
MOBILITY SCORE: 19
DRESSING REGULAR LOWER BODY CLOTHING: A LITTLE
DAILY ACTIVITIY SCORE: 24
DAILY ACTIVITIY SCORE: 19
STANDING UP FROM CHAIR USING ARMS: A LITTLE
PATIENT BASELINE BEDBOUND: NO
CLIMB 3 TO 5 STEPS WITH RAILING: A LITTLE
CLIMB 3 TO 5 STEPS WITH RAILING: A LITTLE
PERSONAL GROOMING: A LITTLE
MOBILITY SCORE: 23
CLIMB 3 TO 5 STEPS WITH RAILING: A LITTLE
CLIMB 3 TO 5 STEPS WITH RAILING: A LITTLE
MOBILITY SCORE: 23

## 2025-07-11 ASSESSMENT — ACTIVITIES OF DAILY LIVING (ADL)
ADL_ASSISTANCE: INDEPENDENT
HEARING - LEFT EAR: FUNCTIONAL
BATHING_ASSISTANCE: OTHER (COMMENT)
ADEQUATE_TO_COMPLETE_ADL: YES
LACK_OF_TRANSPORTATION: NO
FEEDING YOURSELF: INDEPENDENT
HEARING - RIGHT EAR: FUNCTIONAL
JUDGMENT_ADEQUATE_SAFELY_COMPLETE_DAILY_ACTIVITIES: YES
LACK_OF_TRANSPORTATION: NO
WALKS IN HOME: INDEPENDENT
ADL_ASSISTANCE: INDEPENDENT
LACK_OF_TRANSPORTATION: NO
PATIENT'S MEMORY ADEQUATE TO SAFELY COMPLETE DAILY ACTIVITIES?: YES
DRESSING YOURSELF: INDEPENDENT
TOILETING: INDEPENDENT
GROOMING: INDEPENDENT
BATHING: INDEPENDENT

## 2025-07-11 ASSESSMENT — PAIN - FUNCTIONAL ASSESSMENT
PAIN_FUNCTIONAL_ASSESSMENT: 0-10
PAIN_FUNCTIONAL_ASSESSMENT: FLACC (FACE, LEGS, ACTIVITY, CRY, CONSOLABILITY)
PAIN_FUNCTIONAL_ASSESSMENT: 0-10

## 2025-07-11 ASSESSMENT — PAIN SCALES - GENERAL
PAINLEVEL_OUTOF10: 0 - NO PAIN
PAINLEVEL_OUTOF10: 3
PAINLEVEL_OUTOF10: 0 - NO PAIN

## 2025-07-11 ASSESSMENT — PATIENT HEALTH QUESTIONNAIRE - PHQ9
1. LITTLE INTEREST OR PLEASURE IN DOING THINGS: NOT AT ALL
2. FEELING DOWN, DEPRESSED OR HOPELESS: NOT AT ALL
SUM OF ALL RESPONSES TO PHQ9 QUESTIONS 1 & 2: 0

## 2025-07-11 NOTE — CONSULTS
"Nutrition Assessement Note    Nutrition Assessment    Reason for Assessment: Admission nursing screening    Reason for Hospital Admission:  Nevaeh Pfeiffer is a 79 y.o. female who is admitted for SOB. Patient known to dept from past admission. Hx of weight loss and malnutrition. Weight appears stable with possible weight gain in past year. Patient out of room at time of visit,  is also admitted. Will continue to follow and monitor nutrition needs.    Malnutrition Screening Tool (MST)  Have you recently lost weight without trying?: Yes  If yes, how much weight have you lost?: Lost 24 - 33 pounds  Weight Loss Score: 3  Have you been eating poorly because of a decreased appetite?: Yes  Malnutrition Score: 4  Nutrition Screen  Stage 3 or 4 Pressure Injury or Multiple Non-Healing Wounds: No  Home Tube Feeding or Total Parenteral Nutrition (TPN): No  Dietitian Consult Needed: No    Medical History[1]   Surgical History[2]    Nutrition History:  Energy Intake:  (need more information)  Food and Nutrient History: 1 meal documented @100%     Anthropometrics:  Ht: 162.6 cm (5' 4\"), Wt: 63.5 kg (140 lb), BMI: 24.02  IBW/kg (Dietitian Calculated): 54.55 kg  Percent of IBW: 117 %     Weight Change:  Daily Weight  07/11/25 : 63.5 kg (140 lb)  07/03/25 : 58.1 kg (128 lb)  03/06/25 : 56.7 kg (125 lb)  02/11/25 : 57.6 kg (127 lb)  02/07/25 : 55.3 kg (122 lb)  01/14/25 : 57.6 kg (127 lb)  12/21/24 : 58.5 kg (128 lb 15.5 oz)  11/05/24 : 59.4 kg (131 lb)  07/08/24 : 61.7 kg (136 lb)  03/06/24 : 64 kg (141 lb)    Weight History / % Weight Change: records indicate recent weight gain, unable to confirm accuracy of records           Nutrition Focused Physical Exam Findings: defer: unavailable     Nutrition Significant Labs:  Lab Results   Component Value Date    WBC 7.0 07/11/2025    HGB 15.5 07/11/2025    HCT 45.1 07/11/2025     07/11/2025    CHOL 190 02/05/2025    TRIG 75 02/05/2025    HDL 67 02/05/2025    ALT 13 07/11/2025 "    AST 14 07/11/2025     07/11/2025    K 3.7 07/11/2025     07/11/2025    CREATININE 0.79 07/11/2025    BUN 18 07/11/2025    CO2 26 07/11/2025    TSH 2.31 02/05/2025    INR 0.9 08/22/2022    HGBA1C 5.8 (H) 02/05/2025     Nutrition Specific Medications:  Scheduled Medications[3]  Continuous Medications[4]    Dietary Orders (From admission, onward)       Start     Ordered    07/11/25 1001  May Participate in Room Service  ( ROOM SERVICE MAY PARTICIPATE)  Once        Question:  .  Answer:  Yes    07/11/25 1000    07/11/25 0106  Adult diet Regular, Cardiac; 70 gm fat; 2 - 3 grams Sodium  Diet effective now        Question Answer Comment   Diet type Regular    Diet type Cardiac    Fat restriction: 70 gm fat    Sodium restriction: 2 - 3 grams Sodium        07/11/25 0108                   Estimated Needs:   Estimated Energy Needs  Total Energy Estimated Needs in 24 hours (kCal):  (7666-0424)  Energy Estimated Needs per kg Body Weight in 24 hours (kCal/kg):  (25-30)  Method for Estimating Needs: recorder actual wt    Estimated Protein Needs  Total Protein Estimated Needs in 24 Hours (g):  (64-76)  Protein Estimated Needs per kg Body Weight in 24 Hours (g/kg):  (1-1.2)  Method for Estimating 24 Hour Protein Needs: recorder actual wt    Estimated Fluid Needs  Total Fluid Estimated Needs in 24 Hours (mL):  (2295-4730)  Method for Estimating 24 Hour Fluid Needs: 1 mL/kcal or per MD     Nutrition Diagnosis   Nutrition Diagnosis:     Nutrition Diagnosis  Patient has Nutrition Diagnosis: Yes  Diagnosis Status (1): New  Nutrition Diagnosis 1: Increased nutrient needs  Related to (1): increased demand for nutrients  As Evidenced by (1): CHF     Nutrition Interventions/Recommendations   Nutrition Interventions and Recommendations:  Nutrition Prescription: Nutrition prescription for oral nutrition    Nutrition Recommendations:  Individualized Nutrition Prescription Provided for : continue cardiac diet as  ordered    Nutrition Interventions/Goals:   Food and/or Nutrient Delivery Interventions  Interventions: Meals and snacks  Meals and Snacks: Fat-modified diet, Mineral-modified diet  Goal: provide as ordered    Education Documentation  No documentation found.            Nutrition Monitoring and Evaluation   Monitoring/Evaluation:   Food/Nutrient Related History Monitoring  Monitoring and Evaluation Plan: Estimated Energy Intake  Estimated Energy Intake: Energy intake greater or equal to 75% of estimated energy needs    Anthropometric Measurements  Monitoring and Evaluation Plan: Body weight  Body Weight: Body weight - Maintain stable weight    Goal Status: New goal(s) identified    Follow Up  Time Spent (min): 30 minutes  Last Date of Nutrition Visit: 07/11/25  Nutrition Follow-Up Needed?: 5-7 days  Follow up Comment: 7/16/25          [1]   Past Medical History:  Diagnosis Date    Abdominal aortic aneurysm (AAA) without rupture 11/12/2023    Atherosclerotic heart disease of native coronary artery with other forms of angina pectoris 11/12/2023    Benign essential hypertension 11/12/2023    CAD S/P percutaneous coronary angioplasty 11/12/2023    Cardiac defibrillator in place 11/12/2023    Chronic systolic heart failure 11/12/2023    Intermittent claudication 11/12/2023    Shortness of breath 11/12/2023    Sustained ventricular tachycardia (Multi) 11/12/2023    Tobacco use disorder 12/21/2024    Ventricular fibrillation (Multi) 11/12/2023   [2]   Past Surgical History:  Procedure Laterality Date    CARDIAC DEFIBRILLATOR PLACEMENT      CHOLECYSTECTOMY      CORONARY ANGIOPLASTY WITH STENT PLACEMENT      HYSTERECTOMY      PACEMAKER PLACEMENT     [3] amiodarone, 200 mg, oral, BID  aspirin, 81 mg, oral, Daily  cholecalciferol, 25 mcg, oral, Daily  dapagliflozin propanediol, 10 mg, oral, Daily  docusate sodium, 100 mg, oral, BID  enoxaparin, 40 mg, subcutaneous, Daily  ezetimibe, 10 mg, oral, Daily  furosemide, 40 mg,  intravenous, BID  ipratropium-albuteroL, 3 mL, nebulization, Once  ipratropium-albuteroL, 3 mL, nebulization, q6h  melatonin, 3 mg, oral, Nightly  metoprolol succinate XL, 50 mg, oral, Daily  mexiletine, 150 mg, oral, q8h  pantoprazole, 40 mg, oral, Daily before breakfast   Or  pantoprazole, 40 mg, intravenous, Daily before breakfast  polyethylene glycol, 17 g, oral, Daily  potassium chloride CR, 20 mEq, oral, Daily  pravastatin, 80 mg, oral, Daily  sacubitriL-valsartan, 0.5 tablet, oral, BID    [4]

## 2025-07-11 NOTE — PROGRESS NOTES
Occupational Therapy    Evaluation    Patient Name: Nevaeh Pfeiffer  MRN: [id number]  Department: [address]  Room: [address] [place] [address]  Today's Date: 7/11/2025  Time Calculation  Start Time: 1037  Stop Time: 1047  Time Calculation (min): 10 min        Assessment:  OT Assessment: Pt presents on eval with generalized weakness,  mildly decreased activity tolerance, and impaired standing balance affecting self-care and functional transfers/mobility. Pt will benefit from continued skilled OT to address these deficits and facilitate returning to functional baseline.  Prognosis: Good  Barriers to Discharge Home: No anticipated barriers  Evaluation/Treatment Tolerance: Patient tolerated treatment well  End of Session Communication: Bedside nurse  End of Session Patient Position: Up in chair, Alarm on (Needs in reach.)  OT Assessment Results: Decreased ADL status, Decreased endurance, Decreased functional mobility, Decreased IADLs  Strengths: Premorbid level of function  Barriers to Participation: Comorbidities    Plan:  Treatment Interventions: ADL retraining, Functional transfer training, Endurance training, Patient/family training, Equipment evaluation/education, Neuromuscular reeducation  OT Frequency: 3 times per week  OT Discharge Recommendations: Low intensity level of continued care  Equipment Recommended upon Discharge: Straight cane  OT Recommended Transfer Status:  (CGA)  OT - OK to Discharge: Yes      Subjective     OT Visit Info:  OT Received On: 07/11/25    General:  General  Reason for Referral: SOB, impaired ADL's/mobility  Referred By: Nayla Thurman MD  Past Medical History Relevant to Rehab: AAA repair, L foot fx; OA; COPD; falls; pacemaker; smoker; GERD; V-fib; CAD; HTN; NSTEMI;  Family/Caregiver Present: No  Co-Treatment: PT  Co-Treatment Reason: To optimize pt safety and outcomes  Prior to Session Communication: Bedside nurse  Patient Position Received: Bed, 2 rail up, Alarm off, not on at start of session  General  Comment: Pt is a 78 yo female admitted to the hospital with worsening SOB and diagnosed with acute on chronic CHF.    Precautions:  Hearing/Visual Limitations: vision and hearing WFL  Medical Precautions: Fall precautions    Pain:  Pain Assessment  Pain Assessment: FLACC (Face, Legs, Activity, Cry, Consolability)  0-10 (Numeric) Pain Score: 3  Pain Type: Chronic pain  Pain Location: Knee  Pain Orientation: Right  Pain Interventions: Rest    Objective     Cognition:  Overall Cognitive Status: Within Functional Limits  Orientation Level: Oriented X4  Insight: Mild    Home Living:  Type of Home: House  Lives With: Spouse (currently a patient in the hospital)  Home Adaptive Equipment: Walker rolling or standard, Cane, Wheelchair-manual, Reacher  Home Layout: One level, Laundry in basement  Home Access:  (2 entry stairs with 1 railing)  Bathroom Shower/Tub: Tub/shower unit  Bathroom Toilet: Standard  Bathroom Equipment: Grab bars in shower, Shower chair with back  Home Living Comments: pt has 12 stairs with 1 railing to a basement laundry - but DOES NOT USE; pts grandson assists with laundry    Prior Function:  Level of Christine: Independent with ADLs and functional transfers, Independent with homemaking with ambulation  Receives Help From: Family (pts son and grandson assist as needed)  ADL Assistance: Independent  Homemaking Assistance: Independent  Ambulatory Assistance: Independent  Vocational: Retired  Hand Dominance: Right  Prior Function Comments: pt reported being active and independent. pts dtr provides transportation    ADL:  Eating Assistance: Independent  Grooming Assistance: Stand by  Grooming Deficit: Supervision/safety, Other (Comment) (in standing)  Bathing Assistance: Other (Comment) (CGA)  Bathing Deficit: Steadying, Supervision/safety  UE Dressing Assistance: Stand by  UE Dressing Deficit: Setup  LE Dressing Assistance:  (CGA)  LE Dressing Deficit: Steadying  Toileting Assistance with Device: Stand  by  Toileting Deficit: Supervison/safety    Activity Tolerance:  Activity Tolerance Comments: Fair+    Bed Mobility/Transfers: Bed Mobility  Bed Mobility:  (Pt completed supine to sit in bed with distant S for safety.)    Transfers  Transfer:  (Pt completed sit<>stand with CGA for balance/safety.)    Functional Mobility:  Functional Mobility  Functional Mobility Performed:  (Pt tolerated functional mobility for a household distance using no device with min A to CGA for balance/safety. Pt has chronic R knee issues and has a tendency to grab for things during performance.)    Sitting Balance:  Static Sitting Balance  Static Sitting-Balance Support: Bilateral upper extremity supported, Feet supported  Static Sitting-Level of Assistance: Distant supervision    Standing Balance:  Static Standing Balance  Static Standing-Balance Support: No upper extremity supported  Static Standing-Level of Assistance: Contact guard, Close supervision     Sensation:  Sensation Comment: pt denies paresthesias    Strength:  Strength Comments: RUE 2/5 shoulder, 4/5 distally (LUE 4/5)    Coordination:  Coordination Comment: BUE's WFL grossly     Hand Function:  Gross Grasp: Functional  Coordination: Functional      Outcome Measures:[facility] Daily Activity  Putting on and taking off regular lower body clothing: A little  Bathing (including washing, rinsing, drying): A little  Putting on and taking off regular upper body clothing: A little  Toileting, which includes using toilet, bedpan or urinal: A little  Taking care of personal grooming such as brushing teeth: A little  Eating Meals: None  Daily Activity - Total Score: 19    Education Documentation  ADL Training, taught by Carlos Camarillo Jr., OT at 7/11/2025  1:08 PM.  Learner: Patient  Readiness: Acceptance  Method: Explanation  Response: Verbalizes Understanding  Comment: Education and verbal cues provided throughout eval.    Education Comments  No comments found.    Goals:  Encounter  Problems       Encounter Problems (Active)       OT Goals       Pt will complete all ADL's with mod indep/indep using adaptive equipment as needed. (Progressing)       Start:  07/11/25    Expected End:  08/11/25            Pt will complete all functional transfers and mobility with mod indep using a device or no device. (Progressing)       Start:  07/11/25    Expected End:  08/11/25            Pt will tolerate functional mobility for a household distance using a device or no device with mod indep/indep. (Progressing)       Start:  07/11/25    Expected End:  08/11/25

## 2025-07-11 NOTE — PROGRESS NOTES
07/11/25 1630   Discharge Planning   Living Arrangements Spouse/significant other   Support Systems Spouse/significant other;Children   Assistance Needed cane for ambulation, daughter provides transportation, delivery for food, can cook and clean   Type of Residence Private residence   Number of Stairs to Enter Residence 2   Do you have animals or pets at home? Yes   Type of Animals or Pets 2 cats   Who is requesting discharge planning? Provider   Home or Post Acute Services None   Expected Discharge Disposition Home   Does the patient need discharge transport arranged? No   Financial Resource Strain   How hard is it for you to pay for the very basics like food, housing, medical care, and heating? Not very   Housing Stability   In the last 12 months, was there a time when you were not able to pay the mortgage or rent on time? N   In the past 12 months, how many times have you moved where you were living? 0   At any time in the past 12 months, were you homeless or living in a shelter (including now)? N   Transportation Needs   In the past 12 months, has lack of transportation kept you from medical appointments or from getting medications? no   In the past 12 months, has lack of transportation kept you from meetings, work, or from getting things needed for daily living? No     TCC spoke to pt at bedside. Pt is not on oxygen or dialysis. PT smokes 1ppd cigarettes, does not drink alcohol. Pt is not a diabetic. PCP is Dr. RICHIE Thurman. Pharmacy of choice is [facility]. Pt is not a , has a LW and POA is spouse. Spouse is admitted now as well at hospital. Pt declines any skilled need at time of discharge. Pt will return home when medically clear.     DISCHARGE PLAN TO RETURN HOME NO SKILLED NEEDS WHEN MED CLEAR.

## 2025-07-11 NOTE — NURSING NOTE
Pt arrived to unit via wheelchair. Pt A&Ox3. Pupils equal and reactive. Denies pain. Skin clean, dry and intact. Mucous membranes pink and moist. ROM present and equal to all extremities. Pt continent of bowel and bladder. Pt's belongings include a purse with a wallet and papers inside. Purse left at bedside. Pt oriented to room/unit. Bed in lowest position. Call light in reach.

## 2025-07-11 NOTE — CARE PLAN
The patient's goals for the shift include      The clinical goals for the shift include to be hemodynamically stable      Problem: Heart Failure  Goal: Improved gas exchange this shift  Outcome: Progressing  Goal: Improved urinary output this shift  Outcome: Progressing  Goal: Reduction in peripheral edema within 24 hours  Outcome: Progressing  Goal: Report improvement of dyspnea/breathlessness this shift  Outcome: Progressing  Goal: Weight from fluid excess reduced over 2-3 days, then stabilize  Outcome: Progressing  Goal: Increase self care and/or family involvement in 24 hours  Outcome: Progressing     Problem: Pain - Adult  Goal: Verbalizes/displays adequate comfort level or baseline comfort level  Outcome: Progressing     Problem: Safety - Adult  Goal: Free from fall injury  Outcome: Progressing     Problem: Discharge Planning  Goal: Discharge to home or other facility with appropriate resources  Outcome: Progressing     Problem: Chronic Conditions and Co-morbidities  Goal: Patient's chronic conditions and co-morbidity symptoms are monitored and maintained or improved  Outcome: Progressing     Problem: Nutrition  Goal: Nutrient intake appropriate for maintaining nutritional needs  Outcome: Progressing

## 2025-07-11 NOTE — PROGRESS NOTES
Physical Therapy    Physical Therapy Evaluation    Patient Name: Nevaeh Pfeiffer  MRN: [id number]  Department: [address]  Room: [address] [place], [place] [address]  Today's Date: 7/11/2025   Time Calculation  Start Time: 1036  Stop Time: 1052  Time Calculation (min): 16 min    Assessment/Plan   PT Assessment  PT Assessment Results: Decreased strength, Decreased endurance, Decreased coordination, Impaired balance, Impaired judgement, Pain  Rehab Prognosis: Good  Barriers to Discharge Home: Physical needs  Physical Needs: Stair navigation into home limited by function/safety, Intermittent mobility assistance needed  Evaluation/Treatment Tolerance: Patient limited by fatigue, Patient limited by pain  Medical Staff Made Aware: Yes  Barriers to Participation: Comorbidities  End of Session Communication: Bedside nurse  Assessment Comment: pt would benefit from OUTPATIENT PT for endurance, balance and gait training and intermittent  assist/support. Recommend use of cane for longer distances  End of Session Patient Position: Alarm on, Up in chair (call button in reach)  IP OR SWING BED PT PLAN  Inpatient or Swing Bed: Inpatient  PT Plan  Treatment/Interventions: Bed mobility, Transfer training, Gait training, Stair training, Balance training, Strengthening, Endurance training, Therapeutic exercise  PT Plan: Ongoing PT  PT Frequency: 4 times per week (during acute inpatient hospitalization)  PT Discharge Recommendations: Low intensity level of continued care  Equipment Recommended upon Discharge: Straight cane  PT Recommended Transfer Status:  (MIN A)  PT - OK to Discharge: Yes    Subjective     PT Visit Info:  PT Received On: 07/11/25  General Visit Information:  General  Reason for Referral: pt is a 78 y/o female admitted with acute on chronic CHF; pt c/o SOB; impaired mobility  Referred By: Dr. Thurman  Past Medical History Relevant to Rehab: AAA repair, L foot fx; OA; COPD; falls; pacemaker; smoker; GERD; V-fib; CAD; HTN; NSTEMI;  Family/Caregiver  Present: No  Co-Treatment: OT  Prior to Session Communication: Bedside nurse  Patient Position Received: Bed, 2 rail up, Alarm off, not on at start of session  General Comment: pt cleared for therapy; pt cooperative and agreeable to work with therapy      Home Living:  Home Living  Type of Home: House  Lives With: Spouse (who is currently in the hospital)  Home Adaptive Equipment:  (cane, RW, w/c and reacher)  Home Layout: One level, Laundry in basement  Home Access:  (2 entry stairs with 1 railing)  Bathroom Shower/Tub: Tub/shower unit  Home Living Comments: pt has 12 stairs with 1 railing to a basement laundry - but DOES NOT USE; pts grandson assists with laundry      Prior Level of Function:  Prior Function Per Pt/Caregiver Report  Level of [place]: Independent with ADLs and functional transfers, Independent with homemaking with ambulation  Receives Help From:  (pts son and grandson assist as needed)  ADL Assistance: Independent  Homemaking Assistance: Independent  Ambulatory Assistance: Independent (uses no AD)  Vocational: Retired  Prior Function Comments: pt reported being active and independent. pts son provides transportation      Precautions:  Precautions  Hearing/Visual Limitations: vision and hearing WFL  Medical Precautions: Fall precautions  Precautions Comment: educated and instructed pt in energy conservation techniques and safety techniques during gait training         Vital Signs Comment: pt on room air; SaO2 fluctuated between 86-93%; HR 76 bpm     Objective   Pain:  Pain Assessment  Pain Assessment:  (FLACC 3/10 R knee pain during WB; RN to medicate)  Cognition:  Cognition  Overall Cognitive Status: Within Functional Limits  Orientation Level: Oriented X4  Safety/Judgement: Within Functional Limits  Insight: Mild    General Assessments:        Activity Tolerance  Endurance:  (FAIR+ activity tolerance)    Sensation  Sensation Comment: denies any numbness/tingling        Coordination  Coordination Comment: short inconsistent steps    Postural Control  Posture Comment: mild kyphotic posture    Static Sitting Balance  Static Sitting-Comment/Number of Minutes: WNL  Dynamic Sitting Balance  Dynamic Sitting-Comments: GOOD+    Static Standing Balance  Static Standing-Comment/Number of Minutes: GOOD-  Dynamic Standing Balance  Dynamic Standing-Comments: FAIR+      Functional Assessments:  Bed Mobility  Bed Mobility:  (supine to sit with DISTANT SUPERVISION)    Transfers  Transfer:  (sit <-> stand with CGA)    Ambulation/Gait Training  Ambulation/Gait Training Performed:  (pt amb 50' x 1 without assistive deivce and MIN A/Handheld A. pt presented with slow kristina, very narrow ANAND with occasional scissoring, decreased B arm swing and mild limp due to R knee discomfort. mild unsteadiness and fatigue observed; SaO2 dropped to 86%; instructed pt in energy conservation techniques       Extremity/Trunk Assessments:  RUE   RUE :  (limited AROM shoulder elevation)  LUE   LUE:  (WFL)  RLE   RLE :  (WFL with 3/5 strength)  LLE   LLE :  (WFL with 3+/5 strength)      Outcome Measures:  [facility] Basic Mobility  Turning from your back to your side while in a flat bed without using bedrails: None  Moving from lying on your back to sitting on the side of a flat bed without using bedrails: A little  Moving to and from bed to chair (including a wheelchair): A little  Standing up from a chair using your arms (e.g. wheelchair or bedside chair): A little  To walk in hospital room: A little  Climbing 3-5 steps with railing: A little  Basic Mobility - Total Score: 19    Encounter Problems       Encounter Problems (Active)       Mobility       STG - Patient will ambulate 150' x 1 using cane with MOD INDEPENDENT (Progressing)       Start:  07/11/25    Expected End:  07/25/25            STG - Patient will negotiate 2 stairs using 1 railing with DIST SUPERVISION (Progressing)       Start:  07/11/25    Expected End:   07/25/25               PT Transfers       STG - Patient to transfer to and from sit to supine INDEPENDENTLY (Progressing)       Start:  07/11/25    Expected End:  07/25/25            STG - Patient will transfer sit to and from stand INDEPENDENTLY (Progressing)       Start:  07/11/25    Expected End:  07/25/25                   Education Documentation  Precautions, taught by Geo Ross, PT at 7/11/2025 12:25 PM.  Learner: Patient  Readiness: Eager  Method: Explanation  Response: Verbalizes Understanding, Needs Reinforcement    Body Mechanics, taught by Geo Ross PT at 7/11/2025 12:25 PM.  Learner: Patient  Readiness: Eager  Method: Explanation  Response: Verbalizes Understanding, Needs Reinforcement    Mobility Training, taught by Geo Ross PT at 7/11/2025 12:25 PM.  Learner: Patient  Readiness: Eager  Method: Explanation  Response: Verbalizes Understanding, Needs Reinforcement    Education Comments  No comments found.

## 2025-07-11 NOTE — ED PROVIDER NOTES
HPI   Chief Complaint   Patient presents with    Shortness of Breath       Patient is a 79-year-old female presenting with concerns for shortness of breath.  History of COPD and heart failure.  Does not wear oxygen.  States she does not take a diuretic at home.  Has not taken any albuterol inhalers help with her shortness of breath.  Did arrive hypoxic in the 80s on room air via EMS.  They did put the patient on O2 which she does not typically have.  Patient denies fevers, chills, cough, sore throat, runny nose, chest pain, abdominal pain, nausea, vomiting, diarrhea or urinary complaints.              Patient History   Medical History[1]  Surgical History[2]  Family History[3]  Social History[4]    Physical Exam   ED Triage Vitals [07/10/25 2233]   Temperature Heart Rate Respirations BP   36.4 °C (97.5 °F) 71 20 (!) 138/96      Pulse Ox Temp Source Heart Rate Source Patient Position   (!) 93 % Oral Monitor Lying      BP Location FiO2 (%)     Left arm --       Physical Exam  Vitals and nursing note reviewed.   Constitutional:       Appearance: She is well-developed.      Comments: Awake, laying in examination bed   HENT:      Head: Normocephalic and atraumatic.      Nose: Nose normal.      Mouth/Throat:      Mouth: Mucous membranes are moist.      Pharynx: Oropharynx is clear.   Eyes:      Extraocular Movements: Extraocular movements intact.      Conjunctiva/sclera: Conjunctivae normal.      Pupils: Pupils are equal, round, and reactive to light.   Cardiovascular:      Rate and Rhythm: Normal rate and regular rhythm.      Pulses: Normal pulses.      Heart sounds: Normal heart sounds. No murmur heard.  Pulmonary:      Effort: Pulmonary effort is normal. No respiratory distress.      Breath sounds: Normal breath sounds.      Comments: Currently on nasal cannula  Abdominal:      General: Abdomen is flat.      Palpations: Abdomen is soft.      Tenderness: There is no abdominal tenderness.   Musculoskeletal:          General: No swelling. Normal range of motion.      Cervical back: Normal range of motion and neck supple.   Skin:     General: Skin is warm and dry.      Capillary Refill: Capillary refill takes less than 2 seconds.   Neurological:      General: No focal deficit present.      Mental Status: She is alert and oriented to person, place, and time.   Psychiatric:         Mood and Affect: Mood normal.         Behavior: Behavior normal.           ED Course & MDM   ED Course as of 07/11/25 0129   Thu Jul 10, 2025   2240 EKG interpreted by me: Sinus rhythm, rate 67.  Left bundle branch block.  Sgarbossa criteria not met. [ML]   2351 EKG interpreted by me: Normal sinus versus atrial paced rhythm, rate 60.  Left bundle branch block.  Sgarbossa criteria not met. [ML]      ED Course User Index  [ML] Gilberto Amaya MD         Diagnoses as of 07/11/25 0129   Acute on chronic congestive heart failure, unspecified heart failure type   Acute hypoxemic respiratory failure                 No data recorded     Bj Coma Scale Score: 15 (07/10/25 2230 : Iram Shore RN)                           Medical Decision Making  Patient is a 79-year-old female presenting with concerns for shortness of breath.  Lab work, viral swabs and imaging ordered.  Conditions considered include but are not limited: CHF exacerbation, COPD.    Attending physician was available for consultation with patient.  CBC and CMP are unremarkable.  Troponins within normal limits.  BNP is elevated over 4000.  Viral swabs are negative.  Chest x-ray does show CHF with bilateral pleural effusions.  Patient was given breathing treatments and Lasix.  She is feeling improved.    Due to patient's hypoxia, will admit to provider.  I spoke with Dr. Thurman, who is agreeable to admit this patient under her services.  As I deemed necessary from the patient's history, physical, laboratory imaging findings as well as ED course, I considered the above listed  diagnoses.    Portions of this note made with Dragon software, please be mindful of potential grammatical errors.      Medications   ipratropium-albuteroL (Duo-Neb) 0.5-2.5 mg/3 mL nebulizer solution 3 mL (has no administration in time range)   acetaminophen (Tylenol) tablet 650 mg (has no administration in time range)     Or   acetaminophen (Tylenol) oral liquid 650 mg (has no administration in time range)     Or   acetaminophen (Tylenol) suppository 650 mg (has no administration in time range)   melatonin tablet 3 mg (has no administration in time range)   polyethylene glycol (Glycolax, Miralax) packet 17 g (has no administration in time range)   docusate sodium (Colace) capsule 100 mg (has no administration in time range)   benzocaine-menthol (Cepastat Sore Throat) lozenge 1 lozenge (has no administration in time range)   dextromethorphan-guaifenesin (Robitussin DM)  mg/5 mL oral liquid 5 mL (has no administration in time range)   guaiFENesin (Mucinex) 12 hr tablet 600 mg (has no administration in time range)   enoxaparin (Lovenox) syringe 40 mg (has no administration in time range)   pantoprazole (ProtoNix) EC tablet 40 mg (has no administration in time range)     Or   pantoprazole (Protonix) injection 40 mg (has no administration in time range)   ondansetron (Zofran) tablet 4 mg (has no administration in time range)     Or   ondansetron (Zofran) injection 4 mg (has no administration in time range)   ipratropium-albuteroL (Duo-Neb) 0.5-2.5 mg/3 mL nebulizer solution 3 mL (has no administration in time range)   furosemide (Lasix) injection 40 mg (has no administration in time range)   potassium chloride CR (Klor-Con M20) ER tablet 20 mEq (has no administration in time range)   ipratropium-albuteroL (Duo-Neb) 0.5-2.5 mg/3 mL nebulizer solution 3 mL (3 mL nebulization Given 7/10/25 2331)   methylPREDNISolone sod succinate (SOLU-Medrol) injection 125 mg (125 mg intravenous Given 7/10/25 2332)   furosemide  (Lasix) injection 40 mg (40 mg intravenous Given 7/11/25 0108)     Labs Reviewed   CBC WITH AUTO DIFFERENTIAL - Abnormal       Result Value    WBC 10.7      nRBC 0.0      RBC 4.76      Hemoglobin 14.8      Hematocrit 45.3      MCV 95      MCH 31.1      MCHC 32.7      RDW 14.8 (*)     Platelets 314      Neutrophils % 74.0      Immature Granulocytes %, Automated 0.3      Lymphocytes % 14.8      Monocytes % 5.1      Eosinophils % 5.1      Basophils % 0.7      Neutrophils Absolute 7.91 (*)     Immature Granulocytes Absolute, Automated 0.03      Lymphocytes Absolute 1.59      Monocytes Absolute 0.55      Eosinophils Absolute 0.55 (*)     Basophils Absolute 0.08     COMPREHENSIVE METABOLIC PANEL - Abnormal    Glucose 163 (*)     Sodium 141      Potassium 3.7      Chloride 109 (*)     Bicarbonate 23      Anion Gap 13      Urea Nitrogen 18      Creatinine 0.81      eGFR 74      Calcium 8.9      Albumin 3.8      Alkaline Phosphatase 123      Total Protein 6.3 (*)     AST 18      Bilirubin, Total 0.7      ALT 16     B-TYPE NATRIURETIC PEPTIDE - Abnormal    BNP 4,093 (*)     Narrative:        <100 pg/mL - Heart failure unlikely  100-299 pg/mL - Intermediate probability of acute heart                  failure exacerbation. Correlate with clinical                  context and patient history.    >=300 pg/mL - Heart Failure likely. Correlate with clinical                  context and patient history.    BNP testing is performed using different testing methodology at [facility] than at other [facility] hospitals. Direct result comparisons should only be made within the same method.      SARS-COV-2 AND INFLUENZA A/B PCR - Normal    Flu A Result Not Detected      Flu B Result Not Detected      Coronavirus 2019, PCR Not Detected      Narrative:     This assay is an FDA-cleared, in vitro diagnostic nucleic acid amplification test for the qualitative detection and differentiation of SARS CoV-2/ Influenza A/B from  nasopharyngeal specimens collected from individuals with signs and symptoms of respiratory tract infections, and has been validated for use at [facility]. Negative results do not preclude COVID-19/ Influenza A/B infections and should not be used as the sole basis for diagnosis, treatment, or other management decisions. Testing for SARS CoV-2 is recommended only for patients who meet current clinical and/or epidemiological criteria defined by federal, state, or local public health directives.   SERIAL TROPONIN-INITIAL - Normal    Troponin I, High Sensitivity 10      Narrative:     Less than 99th percentile of normal range cutoff-  Female and children under 18 years old <14 ng/L; Male <21 ng/L: Negative  Repeat testing should be performed if clinically indicated.     Female and children under 18 years old 14-50 ng/L; Male 21-50 ng/L:  Consistent with possible cardiac damage and possible increased clinical   risk. Serial measurements may help to assess extent of myocardial damage.     >50 ng/L: Consistent with cardiac damage, increased clinical risk and  myocardial infarction. Serial measurements may help assess extent of   myocardial damage.      NOTE: Children less than 1 year old may have higher baseline troponin   levels and results should be interpreted in conjunction with the overall   clinical context.     NOTE: Troponin I testing is performed using a different   testing methodology at [facility] than at [facility]. Direct result comparisons should only   be made within the same method.   SERIAL TROPONIN, 1 HOUR - Normal    Troponin I, High Sensitivity 9      Narrative:     Less than 99th percentile of normal range cutoff-  Female and children under 18 years old <14 ng/L; Male <21 ng/L: Negative  Repeat testing should be performed if clinically indicated.     Female and children under 18 years old 14-50 ng/L; Male 21-50 ng/L:  Consistent with possible cardiac  damage and possible increased clinical   risk. Serial measurements may help to assess extent of myocardial damage.     >50 ng/L: Consistent with cardiac damage, increased clinical risk and  myocardial infarction. Serial measurements may help assess extent of   myocardial damage.      NOTE: Children less than 1 year old may have higher baseline troponin   levels and results should be interpreted in conjunction with the overall   clinical context.     NOTE: Troponin I testing is performed using a different   testing methodology at [facility] than at other   [facility]. Direct result comparisons should only   be made within the same method.   TROPONIN SERIES- (INITIAL, 1 HR)    Narrative:     The following orders were created for panel order Troponin I Series, High Sensitivity (0, 1 HR).  Procedure                               Abnormality         Status                     ---------                               -----------         ------                     Troponin I, High Sensiti...[[id number]]  Normal              Final result               Troponin, High Sensitivi...[[id number]]  Normal              Final result                 Please view results for these tests on the individual orders.   CBC   COMPREHENSIVE METABOLIC PANEL     XR chest 1 view   Final Result   CHF with pulmonary edema and bilateral pleural effusions.        MACRO:   None        Signed by: Teresita James 7/10/2025 11:36 PM   Dictation workstation:   [id number]            Procedure  Critical Care    Performed by: Jonathan Gong PA-C  Authorized by: Jonathan Gong PA-C    Critical care provider statement:     Critical care time (minutes):  33    Critical care time was exclusive of:  Separately billable procedures and treating other patients    Critical care was necessary to treat or prevent imminent or life-threatening deterioration of the following conditions:  Respiratory failure    Critical care was time spent  personally by me on the following activities:  Blood draw for specimens, development of treatment plan with patient or surrogate, discussions with primary provider, evaluation of patient's response to treatment, examination of patient, ordering and performing treatments and interventions, ordering and review of laboratory studies, ordering and review of radiographic studies, pulse oximetry, re-evaluation of patient's condition, obtaining history from patient or surrogate, interpretation of cardiac output measurements and review of old charts    Care discussed with: admitting provider           [1]   Past Medical History:  Diagnosis Date    Abdominal aortic aneurysm (AAA) without rupture 11/12/2023    Atherosclerotic heart disease of native coronary artery with other forms of angina pectoris 11/12/2023    Benign essential hypertension 11/12/2023    CAD S/P percutaneous coronary angioplasty 11/12/2023    Cardiac defibrillator in place 11/12/2023    Chronic systolic heart failure 11/12/2023    Intermittent claudication 11/12/2023    Shortness of breath 11/12/2023    Sustained ventricular tachycardia (Multi) 11/12/2023    Tobacco use disorder 12/21/2024    Ventricular fibrillation (Multi) 11/12/2023   [2]   Past Surgical History:  Procedure Laterality Date    CARDIAC DEFIBRILLATOR PLACEMENT      CHOLECYSTECTOMY      CORONARY ANGIOPLASTY WITH STENT PLACEMENT      HYSTERECTOMY      PACEMAKER PLACEMENT     [3]   Family History  Problem Relation Name Age of Onset    Cancer Mother  61    Heart attack Father  67   [4]   Social History  Tobacco Use    Smoking status: Some Days     Current packs/day: 0.50     Average packs/day: 0.5 packs/day for 61.5 years (30.8 ttl pk-yrs)     Types: Cigarettes     Start date: 1964     Passive exposure: Past    Smokeless tobacco: Never   Substance Use Topics    Alcohol use: Not Currently    Drug use: Never        Jonathan Gong PA-C  07/11/25 0129

## 2025-07-11 NOTE — CARE PLAN
The patient's goals for the shift include      Problem: Heart Failure  Goal: Improved gas exchange this shift  Outcome: Progressing  Goal: Improved urinary output this shift  Outcome: Progressing  Goal: Reduction in peripheral edema within 24 hours  Outcome: Progressing  Goal: Report improvement of dyspnea/breathlessness this shift  Outcome: Progressing  Goal: Weight from fluid excess reduced over 2-3 days, then stabilize  Outcome: Progressing  Goal: Increase self care and/or family involvement in 24 hours  Outcome: Progressing     Problem: Pain - Adult  Goal: Verbalizes/displays adequate comfort level or baseline comfort level  Outcome: Progressing     Problem: Safety - Adult  Goal: Free from fall injury  Outcome: Progressing     Problem: Discharge Planning  Goal: Discharge to home or other facility with appropriate resources  Outcome: Progressing     Problem: Chronic Conditions and Co-morbidities  Goal: Patient's chronic conditions and co-morbidity symptoms are monitored and maintained or improved  Outcome: Progressing     Problem: Nutrition  Goal: Nutrient intake appropriate for maintaining nutritional needs  Outcome: Progressing

## 2025-07-11 NOTE — H&P
History Of Present Illness  Nevaeh Pfeiffer is a 79 y.o. female presenting with shortness of breath.  Patient has history of ischemic cardiomyopathy and COPD and continues to smoke but does not use oxygen.  She came with complaints of shortness of breath with hypoxia and 80s on room air via EMS.  She has been feeling weak and stressed out because of her 's illness but shortness of breath came up suddenly.  She does have some chest congestion cough denies any fever and chills   Past medical history: MI, pacemaker/defibrillator, ischemic cardiomyopathy, ejection fraction 20%, AAA 4.6 x 3.8,  Occupation: Retired volunteer at school  Social history: Smokes denies drinking denies drugs  Past Medical History  Medical History[1]    Surgical History  Surgical History[2]     Social History  She reports that she has been smoking cigarettes. She started smoking about 61 years ago. She has a 30.8 pack-year smoking history. She has been exposed to tobacco smoke. She has never used smokeless tobacco. She reports that she does not currently use alcohol. She reports that she does not use drugs.    Family History  Family History[3]     Allergies  Lisinopril, Other, Penicillins, Statins-hmg-coa reductase inhibitors, and Warfarin    Review of Systems   Constitutional:  Negative for activity change, appetite change, chills, diaphoresis, fatigue, fever and unexpected weight change.   HENT:  Negative for congestion, dental problem, drooling, ear discharge, ear pain, facial swelling, hearing loss, mouth sores, nosebleeds, postnasal drip, rhinorrhea, sinus pressure, sinus pain, sneezing, sore throat, tinnitus, trouble swallowing and voice change.    Eyes:  Negative for photophobia, pain, discharge, redness, itching and visual disturbance.   Respiratory:  Positive for cough and shortness of breath. Negative for apnea, choking, chest tightness, wheezing and stridor.    Cardiovascular:  Negative for chest pain, palpitations and leg  swelling.   Gastrointestinal:  Negative for abdominal distention, abdominal pain, anal bleeding, blood in stool, constipation, diarrhea, nausea, rectal pain and vomiting.   Endocrine: Negative for cold intolerance, heat intolerance, polydipsia, polyphagia and polyuria.   Genitourinary:  Negative for decreased urine volume, difficulty urinating, dysuria, enuresis, flank pain, frequency, genital sores, hematuria and urgency.   Musculoskeletal:  Negative for arthralgias, back pain, gait problem, joint swelling, myalgias, neck pain and neck stiffness.   Skin:  Negative for color change, pallor, rash and wound.   Allergic/Immunologic: Negative for environmental allergies, food allergies and immunocompromised state.   Neurological:  Negative for dizziness, tremors, seizures, syncope, facial asymmetry, speech difficulty, weakness, light-headedness, numbness and headaches.   Hematological:  Negative for adenopathy. Does not bruise/bleed easily.   Psychiatric/Behavioral:  Negative for agitation, behavioral problems, confusion, decreased concentration, dysphoric mood, hallucinations, self-injury, sleep disturbance and suicidal ideas. The patient is not nervous/anxious and is not hyperactive.         Physical Exam  Vitals reviewed.   Constitutional:       Appearance: Normal appearance.   HENT:      Head: Normocephalic and atraumatic.      Right Ear: Tympanic membrane, ear canal and external ear normal.      Left Ear: Tympanic membrane, ear canal and external ear normal.      Nose: Nose normal.      Mouth/Throat:      Pharynx: Oropharynx is clear.   Eyes:      Extraocular Movements: Extraocular movements intact.      Conjunctiva/sclera: Conjunctivae normal.      Pupils: Pupils are equal, round, and reactive to light.   Cardiovascular:      Rate and Rhythm: Normal rate and regular rhythm.      Pulses: Normal pulses.      Heart sounds: Normal heart sounds.   Pulmonary:      Effort: Pulmonary effort is normal.      Breath sounds:  "Normal breath sounds.   Abdominal:      General: Abdomen is flat. Bowel sounds are normal.      Palpations: Abdomen is soft.   Musculoskeletal:      Cervical back: Normal range of motion and neck supple.   Skin:     General: Skin is warm and dry.   Neurological:      General: No focal deficit present.      Mental Status: She is alert and oriented to person, place, and time.   Psychiatric:         Mood and Affect: Mood normal.          Last Recorded Vitals  Blood pressure 110/64, pulse 64, temperature 36.5 °C (97.7 °F), temperature source Oral, resp. rate 17, height 1.626 m (5' 4\"), weight 63.5 kg (140 lb), SpO2 94%.    Relevant Results        Results for orders placed or performed during the hospital encounter of 07/10/25 (from the past 24 hours)   CBC and Auto Differential   Result Value Ref Range    WBC 10.7 4.4 - 11.3 x10*3/uL    nRBC 0.0 0.0 - 0.0 /100 WBCs    RBC 4.76 4.00 - 5.20 x10*6/uL    Hemoglobin 14.8 12.0 - 16.0 g/dL    Hematocrit 45.3 36.0 - 46.0 %    MCV 95 80 - 100 fL    MCH 31.1 26.0 - 34.0 pg    MCHC 32.7 32.0 - 36.0 g/dL    RDW 14.8 (H) 11.5 - 14.5 %    Platelets 314 150 - 450 x10*3/uL    Neutrophils % 74.0 40.0 - 80.0 %    Immature Granulocytes %, Automated 0.3 0.0 - 0.9 %    Lymphocytes % 14.8 13.0 - 44.0 %    Monocytes % 5.1 2.0 - 10.0 %    Eosinophils % 5.1 0.0 - 6.0 %    Basophils % 0.7 0.0 - 2.0 %    Neutrophils Absolute 7.91 (H) 1.60 - 5.50 x10*3/uL    Immature Granulocytes Absolute, Automated 0.03 0.00 - 0.50 x10*3/uL    Lymphocytes Absolute 1.59 0.80 - 3.00 x10*3/uL    Monocytes Absolute 0.55 0.05 - 0.80 x10*3/uL    Eosinophils Absolute 0.55 (H) 0.00 - 0.40 x10*3/uL    Basophils Absolute 0.08 0.00 - 0.10 x10*3/uL   Comprehensive metabolic panel   Result Value Ref Range    Glucose 163 (H) 74 - 99 mg/dL    Sodium 141 136 - 145 mmol/L    Potassium 3.7 3.5 - 5.3 mmol/L    Chloride 109 (H) 98 - 107 mmol/L    Bicarbonate 23 21 - 32 mmol/L    Anion Gap 13 10 - 20 mmol/L    Urea Nitrogen 18 6 - 23 " mg/dL    Creatinine 0.81 0.50 - 1.05 mg/dL    eGFR 74 >60 mL/min/1.73m*2    Calcium 8.9 8.6 - 10.3 mg/dL    Albumin 3.8 3.4 - 5.0 g/dL    Alkaline Phosphatase 123 33 - 136 U/L    Total Protein 6.3 (L) 6.4 - 8.2 g/dL    AST 18 9 - 39 U/L    Bilirubin, Total 0.7 0.0 - 1.2 mg/dL    ALT 16 7 - 45 U/L   B-Type Natriuretic Peptide   Result Value Ref Range    BNP 4,093 (H) 0 - 99 pg/mL   Troponin I, High Sensitivity, Initial   Result Value Ref Range    Troponin I, High Sensitivity 10 0 - 13 ng/L   ECG 12 lead   Result Value Ref Range    Ventricular Rate 67 BPM    Atrial Rate 67 BPM    [place] Interval 194 ms    QRS Duration 162 ms    QT Interval 436 ms    QTC Calculation(Bazett) 460 ms    P Axis 57 degrees    R Axis -76 degrees    T Axis 94 degrees    QRS Count 11 beats    Q Onset 198 ms    P Onset 101 ms    P Offset 166 ms    T Offset 416 ms    QTC Fredericia 452 ms   Sars-CoV-2 and Influenza A/B PCR   Result Value Ref Range    Flu A Result Not Detected Not Detected    Flu B Result Not Detected Not Detected    Coronavirus 2019, PCR Not Detected Not Detected   Troponin, High Sensitivity, 1 Hour   Result Value Ref Range    Troponin I, High Sensitivity 9 0 - 13 ng/L   CBC   Result Value Ref Range    WBC 7.0 4.4 - 11.3 x10*3/uL    nRBC 0.0 0.0 - 0.0 /100 WBCs    RBC 4.92 4.00 - 5.20 x10*6/uL    Hemoglobin 15.5 12.0 - 16.0 g/dL    Hematocrit 45.1 36.0 - 46.0 %    MCV 92 80 - 100 fL    MCH 31.5 26.0 - 34.0 pg    MCHC 34.4 32.0 - 36.0 g/dL    RDW 14.7 (H) 11.5 - 14.5 %    Platelets 293 150 - 450 x10*3/uL   Comprehensive metabolic panel   Result Value Ref Range    Glucose 147 (H) 74 - 99 mg/dL    Sodium 142 136 - 145 mmol/L    Potassium 3.7 3.5 - 5.3 mmol/L    Chloride 107 98 - 107 mmol/L    Bicarbonate 26 21 - 32 mmol/L    Anion Gap 13 10 - 20 mmol/L    Urea Nitrogen 18 6 - 23 mg/dL    Creatinine 0.79 0.50 - 1.05 mg/dL    eGFR 77 >60 mL/min/1.73m*2    Calcium 8.8 8.6 - 10.3 mg/dL    Albumin 3.8 3.4 - 5.0 g/dL    Alkaline Phosphatase  119 33 - 136 U/L    Total Protein 6.2 (L) 6.4 - 8.2 g/dL    AST 14 9 - 39 U/L    Bilirubin, Total 0.8 0.0 - 1.2 mg/dL    ALT 13 7 - 45 U/L     MICRONODULAR GROWTH PATTERN         Assessment & Plan  Acute on chronic congestive heart failure, unspecified heart failure type    COPD exacerbation (Multi)    Tobacco use disorder    Hypertension    Hypercholesterolemia    Atherosclerotic heart disease of native coronary artery with other forms of angina pectoris    Cardiac defibrillator in place    Ischemic cardiomyopathy      Start on IV diuretics  Solu-Medrol breathing treatment  Encouraged to quit smoking  Continue home medications  Oxygen as needed  Will consult cardiology  See orders for details     I spent  minutes in the professional and overall care of this patient.      Nayla Thurman MD           [1]   Past Medical History:  Diagnosis Date    Abdominal aortic aneurysm (AAA) without rupture 11/12/2023    Atherosclerotic heart disease of native coronary artery with other forms of angina pectoris 11/12/2023    Benign essential hypertension 11/12/2023    CAD S/P percutaneous coronary angioplasty 11/12/2023    Cardiac defibrillator in place 11/12/2023    Chronic systolic heart failure 11/12/2023    Intermittent claudication 11/12/2023    Shortness of breath 11/12/2023    Sustained ventricular tachycardia (Multi) 11/12/2023    Tobacco use disorder 12/21/2024    Ventricular fibrillation (Multi) 11/12/2023   [2]   Past Surgical History:  Procedure Laterality Date    CARDIAC DEFIBRILLATOR PLACEMENT      CHOLECYSTECTOMY      CORONARY ANGIOPLASTY WITH STENT PLACEMENT      HYSTERECTOMY      PACEMAKER PLACEMENT     [3]   Family History  Problem Relation Name Age of Onset    Cancer Mother  61    Heart attack Father  67

## 2025-07-12 ENCOUNTER — APPOINTMENT (OUTPATIENT)
Dept: RADIOLOGY | Facility: HOSPITAL | Age: 80
DRG: 291 | End: 2025-07-12
Payer: MEDICARE

## 2025-07-12 PROBLEM — R44.3 HALLUCINATIONS: Status: ACTIVE | Noted: 2025-07-12

## 2025-07-12 PROBLEM — F41.9 ANXIETY: Status: ACTIVE | Noted: 2025-07-12

## 2025-07-12 LAB
ALBUMIN SERPL BCP-MCNC: 3.5 G/DL (ref 3.4–5)
ALP SERPL-CCNC: 107 U/L (ref 33–136)
ALT SERPL W P-5'-P-CCNC: 11 U/L (ref 7–45)
ANION GAP SERPL CALCULATED.3IONS-SCNC: 11 MMOL/L (ref 10–20)
AST SERPL W P-5'-P-CCNC: 11 U/L (ref 9–39)
BILIRUB SERPL-MCNC: 0.6 MG/DL (ref 0–1.2)
BUN SERPL-MCNC: 24 MG/DL (ref 6–23)
CALCIUM SERPL-MCNC: 8.5 MG/DL (ref 8.6–10.3)
CHLORIDE SERPL-SCNC: 106 MMOL/L (ref 98–107)
CO2 SERPL-SCNC: 26 MMOL/L (ref 21–32)
CREAT SERPL-MCNC: 1.02 MG/DL (ref 0.5–1.05)
EGFRCR SERPLBLD CKD-EPI 2021: 56 ML/MIN/1.73M*2
ERYTHROCYTE [DISTWIDTH] IN BLOOD BY AUTOMATED COUNT: 14.7 % (ref 11.5–14.5)
GLUCOSE SERPL-MCNC: 154 MG/DL (ref 74–99)
HCT VFR BLD AUTO: 40.5 % (ref 36–46)
HGB BLD-MCNC: 13.7 G/DL (ref 12–16)
MCH RBC QN AUTO: 31.2 PG (ref 26–34)
MCHC RBC AUTO-ENTMCNC: 33.8 G/DL (ref 32–36)
MCV RBC AUTO: 92 FL (ref 80–100)
NRBC BLD-RTO: 0 /100 WBCS (ref 0–0)
PLATELET # BLD AUTO: 285 X10*3/UL (ref 150–450)
POTASSIUM SERPL-SCNC: 3.9 MMOL/L (ref 3.5–5.3)
PROT SERPL-MCNC: 5.8 G/DL (ref 6.4–8.2)
RBC # BLD AUTO: 4.39 X10*6/UL (ref 4–5.2)
SODIUM SERPL-SCNC: 139 MMOL/L (ref 136–145)
WBC # BLD AUTO: 12.4 X10*3/UL (ref 4.4–11.3)

## 2025-07-12 PROCEDURE — 2500000005 HC RX 250 GENERAL PHARMACY W/O HCPCS: Performed by: INTERNAL MEDICINE

## 2025-07-12 PROCEDURE — 97116 GAIT TRAINING THERAPY: CPT | Mod: GP | Performed by: PHYSICAL THERAPIST

## 2025-07-12 PROCEDURE — 1200000002 HC GENERAL ROOM WITH TELEMETRY DAILY

## 2025-07-12 PROCEDURE — RXMED WILLOW AMBULATORY MEDICATION CHARGE

## 2025-07-12 PROCEDURE — 2500000001 HC RX 250 WO HCPCS SELF ADMINISTERED DRUGS (ALT 637 FOR MEDICARE OP)

## 2025-07-12 PROCEDURE — 2500000001 HC RX 250 WO HCPCS SELF ADMINISTERED DRUGS (ALT 637 FOR MEDICARE OP): Performed by: INTERNAL MEDICINE

## 2025-07-12 PROCEDURE — 99222 1ST HOSP IP/OBS MODERATE 55: CPT

## 2025-07-12 PROCEDURE — 94664 DEMO&/EVAL PT USE INHALER: CPT

## 2025-07-12 PROCEDURE — 94640 AIRWAY INHALATION TREATMENT: CPT

## 2025-07-12 PROCEDURE — 99233 SBSQ HOSP IP/OBS HIGH 50: CPT | Performed by: INTERNAL MEDICINE

## 2025-07-12 PROCEDURE — 70450 CT HEAD/BRAIN W/O DYE: CPT

## 2025-07-12 PROCEDURE — 71046 X-RAY EXAM CHEST 2 VIEWS: CPT

## 2025-07-12 PROCEDURE — 2500000002 HC RX 250 W HCPCS SELF ADMINISTERED DRUGS (ALT 637 FOR MEDICARE OP, ALT 636 FOR OP/ED): Performed by: INTERNAL MEDICINE

## 2025-07-12 PROCEDURE — 2500000004 HC RX 250 GENERAL PHARMACY W/ HCPCS (ALT 636 FOR OP/ED): Mod: JW | Performed by: INTERNAL MEDICINE

## 2025-07-12 PROCEDURE — 36415 COLL VENOUS BLD VENIPUNCTURE: CPT

## 2025-07-12 PROCEDURE — 97110 THERAPEUTIC EXERCISES: CPT | Mod: GP | Performed by: PHYSICAL THERAPIST

## 2025-07-12 PROCEDURE — 71046 X-RAY EXAM CHEST 2 VIEWS: CPT | Performed by: RADIOLOGY

## 2025-07-12 PROCEDURE — 70450 CT HEAD/BRAIN W/O DYE: CPT | Performed by: RADIOLOGY

## 2025-07-12 PROCEDURE — 85027 COMPLETE CBC AUTOMATED: CPT

## 2025-07-12 PROCEDURE — 80053 COMPREHEN METABOLIC PANEL: CPT

## 2025-07-12 RX ORDER — FUROSEMIDE 40 MG/1
40 TABLET ORAL DAILY
Status: DISCONTINUED | OUTPATIENT
Start: 2025-07-12 | End: 2025-07-14 | Stop reason: DRUGHIGH

## 2025-07-12 RX ORDER — HYDROXYZINE HYDROCHLORIDE 25 MG/1
25 TABLET, FILM COATED ORAL EVERY 6 HOURS PRN
Status: DISCONTINUED | OUTPATIENT
Start: 2025-07-12 | End: 2025-07-14 | Stop reason: HOSPADM

## 2025-07-12 RX ORDER — HYDROXYZINE HYDROCHLORIDE 25 MG/1
25 TABLET, FILM COATED ORAL ONCE
Status: DISCONTINUED | OUTPATIENT
Start: 2025-07-12 | End: 2025-07-12

## 2025-07-12 RX ADMIN — MEXILETINE HYDROCHLORIDE 150 MG: 150 CAPSULE ORAL at 22:01

## 2025-07-12 RX ADMIN — METHYLPREDNISOLONE SODIUM SUCCINATE 20 MG: 40 INJECTION, POWDER, FOR SOLUTION INTRAMUSCULAR; INTRAVENOUS at 05:50

## 2025-07-12 RX ADMIN — EZETIMIBE 10 MG: 10 TABLET ORAL at 08:38

## 2025-07-12 RX ADMIN — FUROSEMIDE 40 MG: 40 TABLET ORAL at 10:14

## 2025-07-12 RX ADMIN — Medication 2 L/MIN: at 19:47

## 2025-07-12 RX ADMIN — ENOXAPARIN SODIUM 40 MG: 100 INJECTION SUBCUTANEOUS at 08:38

## 2025-07-12 RX ADMIN — POTASSIUM CHLORIDE EXTENDED-RELEASE 20 MEQ: 1500 TABLET ORAL at 08:38

## 2025-07-12 RX ADMIN — MEXILETINE HYDROCHLORIDE 150 MG: 150 CAPSULE ORAL at 13:43

## 2025-07-12 RX ADMIN — PANTOPRAZOLE SODIUM 40 MG: 40 TABLET, DELAYED RELEASE ORAL at 05:50

## 2025-07-12 RX ADMIN — PRAVASTATIN SODIUM 80 MG: 20 TABLET ORAL at 08:38

## 2025-07-12 RX ADMIN — ASPIRIN 81 MG: 81 TABLET, DELAYED RELEASE ORAL at 08:38

## 2025-07-12 RX ADMIN — IPRATROPIUM BROMIDE AND ALBUTEROL SULFATE 3 ML: 2.5; .5 SOLUTION RESPIRATORY (INHALATION) at 07:51

## 2025-07-12 RX ADMIN — AMIODARONE HYDROCHLORIDE 200 MG: 200 TABLET ORAL at 08:37

## 2025-07-12 RX ADMIN — IPRATROPIUM BROMIDE AND ALBUTEROL SULFATE 3 ML: 2.5; .5 SOLUTION RESPIRATORY (INHALATION) at 19:47

## 2025-07-12 RX ADMIN — Medication 25 MCG: at 08:38

## 2025-07-12 RX ADMIN — METHYLPREDNISOLONE SODIUM SUCCINATE 20 MG: 40 INJECTION, POWDER, FOR SOLUTION INTRAMUSCULAR; INTRAVENOUS at 22:01

## 2025-07-12 RX ADMIN — IPRATROPIUM BROMIDE AND ALBUTEROL SULFATE 3 ML: 2.5; .5 SOLUTION RESPIRATORY (INHALATION) at 11:57

## 2025-07-12 RX ADMIN — SACUBITRIL AND VALSARTAN 0.5 TABLET: 24; 26 TABLET, FILM COATED ORAL at 20:59

## 2025-07-12 RX ADMIN — AMIODARONE HYDROCHLORIDE 200 MG: 200 TABLET ORAL at 21:00

## 2025-07-12 RX ADMIN — Medication 3 MG: at 21:00

## 2025-07-12 RX ADMIN — METHYLPREDNISOLONE SODIUM SUCCINATE 20 MG: 40 INJECTION, POWDER, FOR SOLUTION INTRAMUSCULAR; INTRAVENOUS at 13:43

## 2025-07-12 RX ADMIN — MEXILETINE HYDROCHLORIDE 150 MG: 150 CAPSULE ORAL at 05:50

## 2025-07-12 ASSESSMENT — ENCOUNTER SYMPTOMS
CARDIOVASCULAR NEGATIVE: 1
WEAKNESS: 1
EYES NEGATIVE: 1
ALLERGIC/IMMUNOLOGIC NEGATIVE: 1
RESPIRATORY NEGATIVE: 1
MUSCULOSKELETAL NEGATIVE: 1
CONSTITUTIONAL NEGATIVE: 1
PSYCHIATRIC NEGATIVE: 1
ENDOCRINE NEGATIVE: 1
GASTROINTESTINAL NEGATIVE: 1
HEMATOLOGIC/LYMPHATIC NEGATIVE: 1

## 2025-07-12 ASSESSMENT — COGNITIVE AND FUNCTIONAL STATUS - GENERAL
MOBILITY SCORE: 23
MOBILITY SCORE: 23
DAILY ACTIVITIY SCORE: 24
CLIMB 3 TO 5 STEPS WITH RAILING: A LITTLE
DAILY ACTIVITIY SCORE: 24
CLIMB 3 TO 5 STEPS WITH RAILING: A LITTLE
MOBILITY SCORE: 23
CLIMB 3 TO 5 STEPS WITH RAILING: A LITTLE

## 2025-07-12 ASSESSMENT — PAIN - FUNCTIONAL ASSESSMENT
PAIN_FUNCTIONAL_ASSESSMENT: 0-10
PAIN_FUNCTIONAL_ASSESSMENT: 0-10

## 2025-07-12 ASSESSMENT — PAIN SCALES - GENERAL
PAINLEVEL_OUTOF10: 0 - NO PAIN

## 2025-07-12 NOTE — PROGRESS NOTES
Spiritual Care Visit  Spiritual Care Request    Reason for Visit:  Routine Visit: Introduction     Request Received From:       Focus of Care:  Visited With: Patient         Refer to :          Spiritual Care Assessment    Spiritual Assessment:                      Care Provided:  Intended Effects: Build relationship of care and support, Convey a calming presence, Demonstrate caring and concern    Sense of Community and or Shinto Affiliation:  Anglican   Values/Beliefs  Spiritual Requests During Hospitalization: Nevaeh askd to be anointed and to have Commuion.     Addressed Needs/Concerns and/or [place] Through:     Sacramental Encounters  Communion: Patient wants communion  Communion Given Indicator: Yes  Sacrament of Sick-Anointing: Anointed    Outcome:        Advance Directives:         Spiritual Care Annotation    Annotation:  Nevaeh asked to be anointed and to have Communion today.  Important Note; her , Kamar, is in Room 400 here at the hospital too.  Abelino Godwin

## 2025-07-12 NOTE — CONSULTS
Inpatient consult to Cardiology  Consult performed by: Suyapa Dent, SHERIDAN-CNP  Consult ordered by: Nayla Thurman MD  Reason for consult: CHF          Reason For Consult  CHF    History Of Present Illness  Nevaeh Pfeiffer is a 79 y.o. female presenting with shortness of breath.  Patient states that her shortness of breath came up suddenly on Sunday 7/6/25.  States that she was vacuuming and was feeling short of breath and lightheaded.  She said that she had to take a break and sat for about 10 minutes.  She still felt weak and was slightly diaphoretic and called the EMS.  Patient does not wear oxygen at home and is a current smoker. On arrival to the ED patient was found to be hypoxic with sats in the 80s on room air.  Patient denies chest pain, palpitations, pressure, or tightness.  Patient denies worsening shortness of breath, on 2 L nasal cannula.   Past medical history includes hypertension, ischemic cardiomyopathy s/p PCI to LAD in June 2022 and has a hx of PCI in 2003 s/p AWMI-NSVT at that time, Ventricular tachycardia, Medtronic ICD in situ- EF 20% for primary and secondary prevention, systolic heart failure (NYHA class II-III), AAA, GERD, COPD, severe myalgias with different statins, current smoker.  Follows with electrophysiologist, Dr. Tripathi, on mexiletine.  Echocardiogram was completed in December 2024 that showed an EF of 15 to 20%, abnormal wall motion, spectral Doppler showed an abnormal pattern of left ventricular diastolic filling, severely dilated left ventricular cavity, severe global LV hypokinesis with with akinesis of the anterosptal wall and LV apex, normal right ventricular global systolic function, mild mitral valve regurgitation.    EKG obtained in the ED showed Normal sinus rhythm, possible Left atrial enlargement, with a left axis deviation and nonspecific intraventricular block.  Most recent labs were from 7/11 that showed a potassium of 3.7, BUN 18, creatinine 0.79, BNP 4093,  troponin of 10 and 9, hemoglobin/hematocrit 15.5/45.1.  Chest x-ray obtained showed CHF with pulmonary edema and bilateral pleural effusions.  In the ED patient was given 2 doses of IV Lasix 40 mg and a dose of IV Solu-Medrol 125 mg. Admitting team has reordered patient's home medication of amiodarone 100 mg twice daily, aspirin 81 mg daily, Farxiga 10 mg daily, Zetia 10 mg daily, metoprolol succinate 50 mg daily, mexiletine 150 mg q8H, pravastatin 80 mg daily, and Entresto. Patient sees cardiologist Dr. Moscoso.     Past Medical History  She has a past medical history of Abdominal aortic aneurysm (AAA) without rupture (11/12/2023), Atherosclerotic heart disease of native coronary artery with other forms of angina pectoris (11/12/2023), Benign essential hypertension (11/12/2023), CAD S/P percutaneous coronary angioplasty (11/12/2023), Cardiac defibrillator in place (11/12/2023), Chronic systolic heart failure (11/12/2023), Intermittent claudication (11/12/2023), Shortness of breath (11/12/2023), Sustained ventricular tachycardia (Multi) (11/12/2023), Tobacco use disorder (12/21/2024), and Ventricular fibrillation (Multi) (11/12/2023).    Surgical History  She has a past surgical history that includes Coronary angioplasty with stent; pacemaker placement; Cardiac defibrillator placement; Cholecystectomy; and Hysterectomy.     Social History  She reports that she has been smoking cigarettes. She started smoking about 61 years ago. She has a 30.8 pack-year smoking history. She has been exposed to tobacco smoke. She has never used smokeless tobacco. She reports that she does not currently use alcohol. She reports that she does not use drugs.    Family History  Family History[1]     Allergies  Lisinopril, Other, Penicillins, Statins-hmg-coa reductase inhibitors, and Warfarin    Review of Systems   Constitutional: Negative.    HENT: Negative.     Eyes: Negative.    Respiratory: Negative.     Cardiovascular: Negative.   "  Gastrointestinal: Negative.    Endocrine: Negative.    Genitourinary: Negative.    Musculoskeletal: Negative.    Allergic/Immunologic: Negative.    Neurological:  Positive for weakness.   Hematological: Negative.    Psychiatric/Behavioral: Negative.          Physical Exam  Vitals and nursing note reviewed.   Constitutional:       General: She is not in acute distress.  HENT:      Head: Normocephalic and atraumatic.      Nose: Nose normal.      Mouth/Throat:      Mouth: Mucous membranes are moist.      Pharynx: Oropharynx is clear.   Eyes:      Extraocular Movements: Extraocular movements intact.   Cardiovascular:      Rate and Rhythm: Normal rate and regular rhythm.      Heart sounds: No murmur heard.     No friction rub. No gallop.      Comments: On telemetry patient paced with heart rates in the 60s  Pulmonary:      Effort: No respiratory distress.      Breath sounds: Normal breath sounds. No stridor. No wheezing, rhonchi or rales.      Comments: Patient on 2 L nasal cannula.  Lobes on auscultation are clear  Chest:      Chest wall: No tenderness.   Musculoskeletal:         General: No swelling. Normal range of motion.      Cervical back: Normal range of motion and neck supple.      Right lower leg: No edema.      Left lower leg: No edema.   Skin:     General: Skin is warm and dry.      Capillary Refill: Capillary refill takes less than 2 seconds.   Neurological:      Mental Status: She is alert and oriented to person, place, and time.   Psychiatric:         Mood and Affect: Mood normal.         Behavior: Behavior normal.         Thought Content: Thought content normal.         Judgment: Judgment normal.          Last Recorded Vitals  Blood pressure 110/59, pulse 59, temperature 36.4 °C (97.5 °F), temperature source Oral, resp. rate 18, height 1.626 m (5' 4\"), weight 63.5 kg (140 lb), SpO2 92%.    Relevant Results  Results for orders placed or performed during the hospital encounter of 07/10/25 (from the past 96 " hours)   CBC and Auto Differential   Result Value Ref Range    WBC 10.7 4.4 - 11.3 x10*3/uL    nRBC 0.0 0.0 - 0.0 /100 WBCs    RBC 4.76 4.00 - 5.20 x10*6/uL    Hemoglobin 14.8 12.0 - 16.0 g/dL    Hematocrit 45.3 36.0 - 46.0 %    MCV 95 80 - 100 fL    MCH 31.1 26.0 - 34.0 pg    MCHC 32.7 32.0 - 36.0 g/dL    RDW 14.8 (H) 11.5 - 14.5 %    Platelets 314 150 - 450 x10*3/uL    Neutrophils % 74.0 40.0 - 80.0 %    Immature Granulocytes %, Automated 0.3 0.0 - 0.9 %    Lymphocytes % 14.8 13.0 - 44.0 %    Monocytes % 5.1 2.0 - 10.0 %    Eosinophils % 5.1 0.0 - 6.0 %    Basophils % 0.7 0.0 - 2.0 %    Neutrophils Absolute 7.91 (H) 1.60 - 5.50 x10*3/uL    Immature Granulocytes Absolute, Automated 0.03 0.00 - 0.50 x10*3/uL    Lymphocytes Absolute 1.59 0.80 - 3.00 x10*3/uL    Monocytes Absolute 0.55 0.05 - 0.80 x10*3/uL    Eosinophils Absolute 0.55 (H) 0.00 - 0.40 x10*3/uL    Basophils Absolute 0.08 0.00 - 0.10 x10*3/uL   Comprehensive metabolic panel   Result Value Ref Range    Glucose 163 (H) 74 - 99 mg/dL    Sodium 141 136 - 145 mmol/L    Potassium 3.7 3.5 - 5.3 mmol/L    Chloride 109 (H) 98 - 107 mmol/L    Bicarbonate 23 21 - 32 mmol/L    Anion Gap 13 10 - 20 mmol/L    Urea Nitrogen 18 6 - 23 mg/dL    Creatinine 0.81 0.50 - 1.05 mg/dL    eGFR 74 >60 mL/min/1.73m*2    Calcium 8.9 8.6 - 10.3 mg/dL    Albumin 3.8 3.4 - 5.0 g/dL    Alkaline Phosphatase 123 33 - 136 U/L    Total Protein 6.3 (L) 6.4 - 8.2 g/dL    AST 18 9 - 39 U/L    Bilirubin, Total 0.7 0.0 - 1.2 mg/dL    ALT 16 7 - 45 U/L   B-Type Natriuretic Peptide   Result Value Ref Range    BNP 4,093 (H) 0 - 99 pg/mL   Troponin I, High Sensitivity, Initial   Result Value Ref Range    Troponin I, High Sensitivity 10 0 - 13 ng/L   ECG 12 lead   Result Value Ref Range    Ventricular Rate 67 BPM    Atrial Rate 67 BPM    UT Interval 194 ms    QRS Duration 162 ms    QT Interval 436 ms    QTC Calculation(Bazett) 460 ms    P Axis 57 degrees    R Axis -76 degrees    T Axis 94 degrees     QRS Count 11 beats    Q Onset 198 ms    P Onset 101 ms    P Offset 166 ms    T Offset 416 ms    QTC Fredericia 452 ms   Sars-CoV-2 and Influenza A/B PCR   Result Value Ref Range    Flu A Result Not Detected Not Detected    Flu B Result Not Detected Not Detected    Coronavirus 2019, PCR Not Detected Not Detected   Troponin, High Sensitivity, 1 Hour   Result Value Ref Range    Troponin I, High Sensitivity 9 0 - 13 ng/L   CBC   Result Value Ref Range    WBC 7.0 4.4 - 11.3 x10*3/uL    nRBC 0.0 0.0 - 0.0 /100 WBCs    RBC 4.92 4.00 - 5.20 x10*6/uL    Hemoglobin 15.5 12.0 - 16.0 g/dL    Hematocrit 45.1 36.0 - 46.0 %    MCV 92 80 - 100 fL    MCH 31.5 26.0 - 34.0 pg    MCHC 34.4 32.0 - 36.0 g/dL    RDW 14.7 (H) 11.5 - 14.5 %    Platelets 293 150 - 450 x10*3/uL   Comprehensive metabolic panel   Result Value Ref Range    Glucose 147 (H) 74 - 99 mg/dL    Sodium 142 136 - 145 mmol/L    Potassium 3.7 3.5 - 5.3 mmol/L    Chloride 107 98 - 107 mmol/L    Bicarbonate 26 21 - 32 mmol/L    Anion Gap 13 10 - 20 mmol/L    Urea Nitrogen 18 6 - 23 mg/dL    Creatinine 0.79 0.50 - 1.05 mg/dL    eGFR 77 >60 mL/min/1.73m*2    Calcium 8.8 8.6 - 10.3 mg/dL    Albumin 3.8 3.4 - 5.0 g/dL    Alkaline Phosphatase 119 33 - 136 U/L    Total Protein 6.2 (L) 6.4 - 8.2 g/dL    AST 14 9 - 39 U/L    Bilirubin, Total 0.8 0.0 - 1.2 mg/dL    ALT 13 7 - 45 U/L      Assessment/Plan     Acute hypoxemic respiratory failure   Acute on chronic systolic heart failure (NYHA class II-III)  Medtronic ICD in situ- EF 20% for primary and secondary prevention  Hypertension  Ischemic cardiomyopathy s/p PCI to LAD in June 2022  History of nonsustained ventricular tachycardia- Follows with electrophysiologist, Dr. Tripathi, on mexiletine and amiodarone  COPD  HX AAA  Current smoker        7/12: As above, patient presenting with shortness of breath.  Patient states that her shortness of breath came up suddenly on Sunday 7/6/25.  States that she was vacuuming and was feeling  short of breath and lightheaded.  She said that she had to take a break and sat for about 10 minutes.  She still felt weak and was slightly diaphoretic and called the EMS.  Patient does not wear oxygen at home and is a current smoker. On arrival to the ED patient was found to be hypoxic with sats in the 80s on room air.  Patient denies chest pain, palpitations, pressure, or tightness.  Patient denies worsening shortness of breath, on 2 L nasal cannula.  Echocardiogram was completed in December 2024 that showed an EF of 15 to 20%, abnormal wall motion, spectral Doppler showed an abnormal pattern of left ventricular diastolic filling, severely dilated left ventricular cavity, severe global LV hypokinesis with with akinesis of the anterosptal wall and LV apex, normal right ventricular global systolic function, mild mitral valve regurgitation.    Most recent charted vitals show a heart rate of 59, blood pressure 110/59, satting 92% on 2 L nasal cannula.  On telemetry patient is in a biventricular paced rhythm with heart rates in the 60s.  There were no labs obtained over the past 24 hours; ordered new CMP and CBC. Admitting team has reordered patient's home medication of amiodarone 100 mg twice daily, aspirin 81 mg daily, Farxiga 10 mg daily, Zetia 10 mg daily, metoprolol succinate 50 mg daily, mexiletine 150 mg q8H, pravastatin 80 mg daily, and Entresto. Patient does not take any diuretics at home. According to her cardiologist, Dr. Moscoso, patient is currently off Plavix and only taking aspirin due to easy bruising and bleeding. She feels improved today, still on 2 L NC but on exam lung sounds are clear on auscultation in all lobes. Please ween oxygen as tolerated by the patient.  Will obtain another chest x-ray.  patient also on exam shows no evidence of lower extremity swelling. Will stop IV lasix and start patient on oral lasix 40 mg daily. I/O has not been obtained. Please monitor I/O. We will continue to follow  with you.     I spent 60 minutes in the professional and overall care of this patient.         [1]   Family History  Problem Relation Name Age of Onset    Cancer Mother  61    Heart attack Father  67

## 2025-07-12 NOTE — PROGRESS NOTES
Physical Therapy    Physical Therapy Treatment    Patient Name: Nevaeh Pfeiffer  MRN: [id number]  Department: [address]  Room: [address] [place], [place] [address]  Today's Date: 7/12/2025  Time Calculation  Start Time: 1300  Stop Time: 1325  Time Calculation (min): 25 min    Assessment/Plan   PT Assessment  PT Assessment Results: Decreased strength, Decreased endurance, Impaired balance, Decreased mobility  Rehab Prognosis: Good  Barriers to Discharge Home: Physical needs  Physical Needs: Stair navigation into home limited by function/safety, Intermittent mobility assistance needed  Evaluation/Treatment Tolerance: Patient tolerated treatment well  Medical Staff Made Aware: Yes  Strengths: Attitude of self  Barriers to Participation: Comorbidities  End of Session Communication: Bedside nurse  Assessment Comment: Continue to recommend use of cane for ambulation of community distances due to steadiness and stability. Would further benefit from OUTPATIENT PT consult  End of Session Patient Position: Bed, 2 rail up, Alarm off, not on at start of session  PT Plan  Inpatient/Swing Bed or Outpatient: Inpatient  PT Plan  Treatment/Interventions: Bed mobility, Transfer training, Gait training, Balance training, Neuromuscular re-education, Strengthening, Endurance training, Therapeutic exercise, Therapeutic activity, Range of motion, Home exercise program, Positioning, Postural re-education  PT Plan: Ongoing PT  PT Frequency: 4 times per week  PT Discharge Recommendations: Low intensity level of continued care  Equipment Recommended upon Discharge: Straight cane  PT Recommended Transfer Status: Assistive device, Stand by assist  PT - OK to Discharge: Yes    PT Visit Info:  PT Received On: 07/12/25  Response to Previous Treatment: Patient with no complaints from previous session.     General Visit Information:   General  Reason for Referral: SOB, impaired ADL's/mobility  Referred By: Nayla Thurman MD  Past Medical History Relevant to Rehab: AAA repair, L foot  fx; OA; COPD; falls; pacemaker; smoker; GERD; V-fib; CAD; HTN; NSTEMI;  Family/Caregiver Present: No  Prior to Session Communication: Bedside nurse  Patient Position Received: Bed, 2 rail up, Alarm off, not on at start of session  General Comment: Pt is supine in bed upon arrival, cleared for therapy by nursing, agreeable to PT session.    Subjective   Precautions:  Precautions  Medical Precautions: Fall precautions     Date/Time Vitals Session Patient Position Pulse Resp SpO2 BP MAP (mmHg)    07/12/25 1157 --  --  --  --  95 %  --  --             Objective   Pain:  Pain Assessment  Pain Assessment: 0-10  0-10 (Numeric) Pain Score: 0 - No pain  Cognition:  Cognition  Overall Cognitive Status: Within Functional Limits  Orientation Level: Oriented X4  Coordination:  Movements are Fluid and Coordinated: Yes  Postural Control:  Postural Control  Postural Control: Within Functional Limits  Static Sitting Balance  Static Sitting-Balance Support: Feet supported  Static Sitting-Level of Assistance: Independent  Static Standing Balance  Static Standing-Balance Support: No upper extremity supported  Static Standing-Level of Assistance: Distant supervision  Extremity/Trunk Assessments:  RLE   RLE : Within Functional Limits  LLE   LLE : Within Functional Limits  Activity Tolerance:  Activity Tolerance  Endurance: Tolerates 10 - 20 min exercise with multiple rests  Treatments:  Therapeutic Exercise  Therapeutic Exercise Performed: Yes  Therapeutic Exercise Activity 1:  (AP, GS, QS, LAQ, Seated marches, SLR x15 ea R/L)         Bed Mobility  Bed Mobility: Yes  Bed Mobility 1  Bed Mobility 1: Supine to sitting  Level of Assistance 1: Modified independent  Bed Mobility Comments 1: bedrail  Bed Mobility 2  Bed Mobility  2: Sitting to supine  Level of Assistance 2: Modified independent  Bed Mobility Comments 2: bedrail    Ambulation/Gait Training  Ambulation/Gait Training Performed: Yes  Ambulation/Gait Training 1  Surface 1: Level  tile  Device 1: No device  Assistance 1: Distant supervision  Quality of Gait 1: Narrow base of support, Diminished heel strike  Comments/Distance (ft) 1: Decreased kristina with mild unsteadiness without device. Pt ambulated 20 feet  Transfers  Transfer: Yes  Transfer 1  Transfer From 1: Sit to  Transfer to 1: Stand  Technique 1: Sit to stand  Transfer Level of Assistance 1: Distant supervision  Trials/Comments 1: Upon standing, pt required 1 minute to steady self before ambulating  Transfers 2  Transfer From 2: Stand to  Transfer to 2: Sit  Technique 2: Stand to sit  Transfer Level of Assistance 2: Distant supervision    Stairs  Stairs: No    Outcome Measures:  [facility] Basic Mobility  Turning from your back to your side while in a flat bed without using bedrails: None  Moving from lying on your back to sitting on the side of a flat bed without using bedrails: None  Moving to and from bed to chair (including a wheelchair): None  Standing up from a chair using your arms (e.g. wheelchair or bedside chair): None  To walk in hospital room: None  Climbing 3-5 steps with railing: A little  Basic Mobility - Total Score: 23    Education Documentation  No documentation found.  Education Comments  No comments found.        OP EDUCATION:       Encounter Problems       Encounter Problems (Active)       Mobility       STG - Patient will ambulate 150' x 1 using cane with MOD INDEPENDENT (Progressing)       Start:  07/11/25    Expected End:  07/25/25            STG - Patient will negotiate 2 stairs using 1 railing with DIST SUPERVISION (Not Progressing)       Start:  07/11/25    Expected End:  07/25/25               PT Transfers       STG - Patient to transfer to and from sit to supine INDEPENDENTLY (Progressing)       Start:  07/11/25    Expected End:  07/25/25            STG - Patient will transfer sit to and from stand INDEPENDENTLY (Progressing)       Start:  07/11/25    Expected End:  07/25/25

## 2025-07-12 NOTE — PROGRESS NOTES
"Nevaeh Pfeiffer is a 79 y.o. female on day 1 of admission presenting with Acute on chronic congestive heart failure, unspecified heart failure type.    Subjective   Patient is still requiring 3 L of oxygen.  Daughter is concerned that patient is issues with hallucinations at home.  Intermittently she is confused with hallucination.  She is also very anxious       Objective     Physical Exam  Vitals reviewed.   Constitutional:       Appearance: Normal appearance.   HENT:      Head: Normocephalic and atraumatic.      Right Ear: Tympanic membrane, ear canal and external ear normal.      Left Ear: Tympanic membrane, ear canal and external ear normal.      Nose: Nose normal.      Mouth/Throat:      Pharynx: Oropharynx is clear.   Eyes:      Extraocular Movements: Extraocular movements intact.      Conjunctiva/sclera: Conjunctivae normal.      Pupils: Pupils are equal, round, and reactive to light.   Cardiovascular:      Rate and Rhythm: Normal rate and regular rhythm.      Pulses: Normal pulses.      Heart sounds: Normal heart sounds.   Pulmonary:      Effort: Pulmonary effort is normal.      Breath sounds: Rhonchi present.   Abdominal:      General: Abdomen is flat. Bowel sounds are normal.      Palpations: Abdomen is soft.   Musculoskeletal:      Cervical back: Normal range of motion and neck supple.   Skin:     General: Skin is warm and dry.   Neurological:      General: No focal deficit present.      Mental Status: She is alert and oriented to person, place, and time.   Psychiatric:         Mood and Affect: Mood normal.         Last Recorded Vitals  Blood pressure 110/59, pulse 59, temperature 36.4 °C (97.5 °F), temperature source Oral, resp. rate 18, height 1.626 m (5' 4\"), weight 63.5 kg (140 lb), SpO2 95%.  Intake/Output last 3 Shifts:  I/O last 3 completed shifts:  In: 180 (2.8 mL/kg) [P.O.:180]  Out: - (0 mL/kg)   Weight: 63.5 kg     Relevant Results               Results for orders placed or performed during the " hospital encounter of 07/10/25 (from the past 24 hours)   Comprehensive metabolic panel   Result Value Ref Range    Glucose 154 (H) 74 - 99 mg/dL    Sodium 139 136 - 145 mmol/L    Potassium 3.9 3.5 - 5.3 mmol/L    Chloride 106 98 - 107 mmol/L    Bicarbonate 26 21 - 32 mmol/L    Anion Gap 11 10 - 20 mmol/L    Urea Nitrogen 24 (H) 6 - 23 mg/dL    Creatinine 1.02 0.50 - 1.05 mg/dL    eGFR 56 (L) >60 mL/min/1.73m*2    Calcium 8.5 (L) 8.6 - 10.3 mg/dL    Albumin 3.5 3.4 - 5.0 g/dL    Alkaline Phosphatase 107 33 - 136 U/L    Total Protein 5.8 (L) 6.4 - 8.2 g/dL    AST 11 9 - 39 U/L    Bilirubin, Total 0.6 0.0 - 1.2 mg/dL    ALT 11 7 - 45 U/L   CBC   Result Value Ref Range    WBC 12.4 (H) 4.4 - 11.3 x10*3/uL    nRBC 0.0 0.0 - 0.0 /100 WBCs    RBC 4.39 4.00 - 5.20 x10*6/uL    Hemoglobin 13.7 12.0 - 16.0 g/dL    Hematocrit 40.5 36.0 - 46.0 %    MCV 92 80 - 100 fL    MCH 31.2 26.0 - 34.0 pg    MCHC 33.8 32.0 - 36.0 g/dL    RDW 14.7 (H) 11.5 - 14.5 %    Platelets 285 150 - 450 x10*3/uL     ECG 12 lead  Result Date: 7/11/2025  Normal sinus rhythm Possible Left atrial enlargement Left axis deviation Nonspecific intraventricular block Left ventricular hypertrophy ( [place] product ) Abnormal ECG When compared with ECG of 11-FEB-2025 16:20, Premature ventricular complexes are no longer Present Nonspecific intraventricular block has replaced Left bundle branch block Confirmed by Ivan Garcia ([id number]) on 7/11/2025 11:35:00 AM    XR chest 1 view  Result Date: 7/10/2025  Interpreted By:  Teresita James, STUDY: XR CHEST 1 VIEW;  7/10/2025 11:04 pm   INDICATION: Signs/Symptoms:shortness of breath.   COMPARISON: 12/19/2024   ACCESSION NUMBER(S): [id number]   ORDERING CLINICIAN: TESS BROCK   FINDINGS:     CARDIOMEDIASTINAL SILHOUETTE: Cardiomegaly. A pacemaker/AICD leads overlie the right atrium and right ventricle. Coronary artery stent noted.   LUNGS: Diffuse interstitial thickening/edema. Small to moderate right and small left  pleural effusions. No pneumothorax. Mild bilateral perihilar airspace opacities.   ABDOMEN: No remarkable upper abdominal findings.   BONES: No acute osseous abnormality.       CHF with pulmonary edema and bilateral pleural effusions.   MACRO: None   Signed by: Teresita James 7/10/2025 11:36 PM Dictation workstation:   [id number]                   Assessment & Plan  Acute on chronic congestive heart failure, unspecified heart failure type    COPD exacerbation (Multi)    Tobacco use disorder    Hypertension    Hypercholesterolemia    Atherosclerotic heart disease of native coronary artery with other forms of angina pectoris    Cardiac defibrillator in place    Ischemic cardiomyopathy    Hallucinations    Anxiety    Patient's hallucinations could be related to hypoxia  Will get CT of the head  Vistaril for anxiety  Consult pulmonary regarding COPD  Cardiology input noted  Continue diuretics for CHF  Blood pressure medication has to be held because of low blood pressure readings  Switch IV Lasix to p.o.  Case discussed with the daughter      I spent  minutes in the professional and overall care of this patient.      Nayla Thurman MD

## 2025-07-13 ENCOUNTER — APPOINTMENT (OUTPATIENT)
Dept: CARDIOLOGY | Facility: HOSPITAL | Age: 80
DRG: 291 | End: 2025-07-13
Payer: MEDICARE

## 2025-07-13 VITALS
HEIGHT: 64 IN | BODY MASS INDEX: 23.9 KG/M2 | DIASTOLIC BLOOD PRESSURE: 68 MMHG | HEART RATE: 59 BPM | SYSTOLIC BLOOD PRESSURE: 125 MMHG | RESPIRATION RATE: 17 BRPM | WEIGHT: 140 LBS | OXYGEN SATURATION: 94 % | TEMPERATURE: 97.9 F

## 2025-07-13 LAB
ANION GAP SERPL CALCULATED.3IONS-SCNC: 10 MMOL/L (ref 10–20)
BUN SERPL-MCNC: 27 MG/DL (ref 6–23)
CALCIUM SERPL-MCNC: 9 MG/DL (ref 8.6–10.3)
CHLORIDE SERPL-SCNC: 104 MMOL/L (ref 98–107)
CO2 SERPL-SCNC: 28 MMOL/L (ref 21–32)
CREAT SERPL-MCNC: 0.92 MG/DL (ref 0.5–1.05)
EGFRCR SERPLBLD CKD-EPI 2021: 64 ML/MIN/1.73M*2
ERYTHROCYTE [DISTWIDTH] IN BLOOD BY AUTOMATED COUNT: 14.9 % (ref 11.5–14.5)
GLUCOSE SERPL-MCNC: 133 MG/DL (ref 74–99)
HCT VFR BLD AUTO: 42.5 % (ref 36–46)
HGB BLD-MCNC: 14.5 G/DL (ref 12–16)
MCH RBC QN AUTO: 31.4 PG (ref 26–34)
MCHC RBC AUTO-ENTMCNC: 34.1 G/DL (ref 32–36)
MCV RBC AUTO: 92 FL (ref 80–100)
NRBC BLD-RTO: 0 /100 WBCS (ref 0–0)
PLATELET # BLD AUTO: 322 X10*3/UL (ref 150–450)
POTASSIUM SERPL-SCNC: 4.2 MMOL/L (ref 3.5–5.3)
RBC # BLD AUTO: 4.62 X10*6/UL (ref 4–5.2)
SODIUM SERPL-SCNC: 138 MMOL/L (ref 136–145)
WBC # BLD AUTO: 15.8 X10*3/UL (ref 4.4–11.3)

## 2025-07-13 PROCEDURE — 2500000004 HC RX 250 GENERAL PHARMACY W/ HCPCS (ALT 636 FOR OP/ED): Performed by: INTERNAL MEDICINE

## 2025-07-13 PROCEDURE — 2500000002 HC RX 250 W HCPCS SELF ADMINISTERED DRUGS (ALT 637 FOR MEDICARE OP, ALT 636 FOR OP/ED): Performed by: INTERNAL MEDICINE

## 2025-07-13 PROCEDURE — 94640 AIRWAY INHALATION TREATMENT: CPT

## 2025-07-13 PROCEDURE — 36415 COLL VENOUS BLD VENIPUNCTURE: CPT | Performed by: INTERNAL MEDICINE

## 2025-07-13 PROCEDURE — 1200000002 HC GENERAL ROOM WITH TELEMETRY DAILY

## 2025-07-13 PROCEDURE — 99222 1ST HOSP IP/OBS MODERATE 55: CPT

## 2025-07-13 PROCEDURE — S4991 NICOTINE PATCH NONLEGEND: HCPCS

## 2025-07-13 PROCEDURE — 99232 SBSQ HOSP IP/OBS MODERATE 35: CPT

## 2025-07-13 PROCEDURE — 2500000001 HC RX 250 WO HCPCS SELF ADMINISTERED DRUGS (ALT 637 FOR MEDICARE OP)

## 2025-07-13 PROCEDURE — 2500000002 HC RX 250 W HCPCS SELF ADMINISTERED DRUGS (ALT 637 FOR MEDICARE OP, ALT 636 FOR OP/ED)

## 2025-07-13 PROCEDURE — 93005 ELECTROCARDIOGRAM TRACING: CPT

## 2025-07-13 PROCEDURE — 99233 SBSQ HOSP IP/OBS HIGH 50: CPT | Performed by: INTERNAL MEDICINE

## 2025-07-13 PROCEDURE — 2500000001 HC RX 250 WO HCPCS SELF ADMINISTERED DRUGS (ALT 637 FOR MEDICARE OP): Performed by: INTERNAL MEDICINE

## 2025-07-13 PROCEDURE — 80048 BASIC METABOLIC PNL TOTAL CA: CPT | Performed by: INTERNAL MEDICINE

## 2025-07-13 PROCEDURE — 2500000005 HC RX 250 GENERAL PHARMACY W/O HCPCS: Performed by: INTERNAL MEDICINE

## 2025-07-13 PROCEDURE — 85027 COMPLETE CBC AUTOMATED: CPT | Performed by: INTERNAL MEDICINE

## 2025-07-13 RX ORDER — METOPROLOL SUCCINATE 25 MG/1
25 TABLET, EXTENDED RELEASE ORAL DAILY
Status: DISCONTINUED | OUTPATIENT
Start: 2025-07-13 | End: 2025-07-13

## 2025-07-13 RX ORDER — METOPROLOL SUCCINATE 50 MG/1
50 TABLET, EXTENDED RELEASE ORAL DAILY
Status: DISCONTINUED | OUTPATIENT
Start: 2025-07-13 | End: 2025-07-14 | Stop reason: HOSPADM

## 2025-07-13 RX ORDER — BUDESONIDE 0.5 MG/2ML
0.5 INHALANT ORAL
Status: DISCONTINUED | OUTPATIENT
Start: 2025-07-13 | End: 2025-07-14 | Stop reason: HOSPADM

## 2025-07-13 RX ORDER — IBUPROFEN 200 MG
1 TABLET ORAL DAILY
Status: DISCONTINUED | OUTPATIENT
Start: 2025-07-13 | End: 2025-07-14 | Stop reason: HOSPADM

## 2025-07-13 RX ORDER — PREDNISONE 20 MG/1
40 TABLET ORAL DAILY
Status: DISCONTINUED | OUTPATIENT
Start: 2025-07-14 | End: 2025-07-14 | Stop reason: HOSPADM

## 2025-07-13 RX ADMIN — IPRATROPIUM BROMIDE AND ALBUTEROL SULFATE 3 ML: 2.5; .5 SOLUTION RESPIRATORY (INHALATION) at 18:25

## 2025-07-13 RX ADMIN — SACUBITRIL AND VALSARTAN 0.5 TABLET: 24; 26 TABLET, FILM COATED ORAL at 09:07

## 2025-07-13 RX ADMIN — MEXILETINE HYDROCHLORIDE 150 MG: 150 CAPSULE ORAL at 06:42

## 2025-07-13 RX ADMIN — EZETIMIBE 10 MG: 10 TABLET ORAL at 09:08

## 2025-07-13 RX ADMIN — IPRATROPIUM BROMIDE AND ALBUTEROL SULFATE 3 ML: 2.5; .5 SOLUTION RESPIRATORY (INHALATION) at 11:40

## 2025-07-13 RX ADMIN — FUROSEMIDE 40 MG: 40 TABLET ORAL at 09:08

## 2025-07-13 RX ADMIN — BUDESONIDE 0.5 MG: 0.5 INHALANT RESPIRATORY (INHALATION) at 18:25

## 2025-07-13 RX ADMIN — METHYLPREDNISOLONE SODIUM SUCCINATE 20 MG: 40 INJECTION, POWDER, FOR SOLUTION INTRAMUSCULAR; INTRAVENOUS at 06:42

## 2025-07-13 RX ADMIN — AMIODARONE HYDROCHLORIDE 200 MG: 200 TABLET ORAL at 09:08

## 2025-07-13 RX ADMIN — SACUBITRIL AND VALSARTAN 0.5 TABLET: 24; 26 TABLET, FILM COATED ORAL at 20:33

## 2025-07-13 RX ADMIN — PRAVASTATIN SODIUM 80 MG: 20 TABLET ORAL at 09:07

## 2025-07-13 RX ADMIN — DOCUSATE SODIUM 100 MG: 100 CAPSULE, LIQUID FILLED ORAL at 20:26

## 2025-07-13 RX ADMIN — Medication 25 MCG: at 09:08

## 2025-07-13 RX ADMIN — AMIODARONE HYDROCHLORIDE 200 MG: 200 TABLET ORAL at 20:16

## 2025-07-13 RX ADMIN — PANTOPRAZOLE SODIUM 40 MG: 40 TABLET, DELAYED RELEASE ORAL at 06:42

## 2025-07-13 RX ADMIN — ENOXAPARIN SODIUM 40 MG: 100 INJECTION SUBCUTANEOUS at 09:07

## 2025-07-13 RX ADMIN — MEXILETINE HYDROCHLORIDE 150 MG: 150 CAPSULE ORAL at 22:00

## 2025-07-13 RX ADMIN — POTASSIUM CHLORIDE EXTENDED-RELEASE 20 MEQ: 1500 TABLET ORAL at 09:08

## 2025-07-13 RX ADMIN — Medication 3 MG: at 20:17

## 2025-07-13 RX ADMIN — ASPIRIN 81 MG: 81 TABLET, DELAYED RELEASE ORAL at 09:07

## 2025-07-13 RX ADMIN — METOPROLOL SUCCINATE 50 MG: 50 TABLET, EXTENDED RELEASE ORAL at 09:09

## 2025-07-13 RX ADMIN — DAPAGLIFLOZIN 10 MG: 10 TABLET, FILM COATED ORAL at 09:07

## 2025-07-13 RX ADMIN — NICOTINE 1 PATCH: 14 PATCH, EXTENDED RELEASE TRANSDERMAL at 14:39

## 2025-07-13 RX ADMIN — IPRATROPIUM BROMIDE AND ALBUTEROL SULFATE 3 ML: 2.5; .5 SOLUTION RESPIRATORY (INHALATION) at 07:02

## 2025-07-13 RX ADMIN — MEXILETINE HYDROCHLORIDE 150 MG: 150 CAPSULE ORAL at 14:39

## 2025-07-13 ASSESSMENT — COGNITIVE AND FUNCTIONAL STATUS - GENERAL
CLIMB 3 TO 5 STEPS WITH RAILING: A LITTLE
DAILY ACTIVITIY SCORE: 24
MOBILITY SCORE: 23
CLIMB 3 TO 5 STEPS WITH RAILING: A LITTLE
MOBILITY SCORE: 23
DAILY ACTIVITIY SCORE: 24

## 2025-07-13 ASSESSMENT — PAIN SCALES - GENERAL
PAINLEVEL_OUTOF10: 0 - NO PAIN
PAINLEVEL_OUTOF10: 0 - NO PAIN

## 2025-07-13 ASSESSMENT — PAIN - FUNCTIONAL ASSESSMENT: PAIN_FUNCTIONAL_ASSESSMENT: 0-10

## 2025-07-13 NOTE — PROGRESS NOTES
"Nevaeh Pfeiffer is a 79 y.o. female on day 2 of admission presenting with Acute on chronic congestive heart failure, unspecified heart failure type.    Subjective   Patient is requiring less oxygen       Objective     Physical Exam  Vitals reviewed.   Constitutional:       Appearance: Normal appearance.   HENT:      Head: Normocephalic and atraumatic.      Right Ear: Tympanic membrane, ear canal and external ear normal.      Left Ear: Tympanic membrane, ear canal and external ear normal.      Nose: Nose normal.      Mouth/Throat:      Pharynx: Oropharynx is clear.   Eyes:      Extraocular Movements: Extraocular movements intact.      Conjunctiva/sclera: Conjunctivae normal.      Pupils: Pupils are equal, round, and reactive to light.   Cardiovascular:      Rate and Rhythm: Normal rate and regular rhythm.      Pulses: Normal pulses.      Heart sounds: Normal heart sounds.   Pulmonary:      Effort: Pulmonary effort is normal.      Breath sounds: Rhonchi present.   Abdominal:      General: Abdomen is flat. Bowel sounds are normal.      Palpations: Abdomen is soft.   Musculoskeletal:      Cervical back: Normal range of motion and neck supple.   Skin:     General: Skin is warm and dry.   Neurological:      General: No focal deficit present.      Mental Status: She is alert and oriented to person, place, and time.   Psychiatric:         Mood and Affect: Mood normal.         Last Recorded Vitals  Blood pressure 125/68, pulse 59, temperature 36.6 °C (97.9 °F), temperature source Oral, resp. rate 17, height 1.626 m (5' 4\"), weight 63.5 kg (140 lb), SpO2 94%.  Intake/Output last 3 Shifts:  I/O last 3 completed shifts:  In: - (0 mL/kg)   Out: 240 (3.8 mL/kg) [Urine:240 (0.1 mL/kg/hr)]  Weight: 63.5 kg     Relevant Results               Results for orders placed or performed during the hospital encounter of 07/10/25 (from the past 24 hours)   CBC   Result Value Ref Range    WBC 15.8 (H) 4.4 - 11.3 x10*3/uL    nRBC 0.0 0.0 - 0.0 " /100 WBCs    RBC 4.62 4.00 - 5.20 x10*6/uL    Hemoglobin 14.5 12.0 - 16.0 g/dL    Hematocrit 42.5 36.0 - 46.0 %    MCV 92 80 - 100 fL    MCH 31.4 26.0 - 34.0 pg    MCHC 34.1 32.0 - 36.0 g/dL    RDW 14.9 (H) 11.5 - 14.5 %    Platelets 322 150 - 450 x10*3/uL   Basic Metabolic Panel   Result Value Ref Range    Glucose 133 (H) 74 - 99 mg/dL    Sodium 138 136 - 145 mmol/L    Potassium 4.2 3.5 - 5.3 mmol/L    Chloride 104 98 - 107 mmol/L    Bicarbonate 28 21 - 32 mmol/L    Anion Gap 10 10 - 20 mmol/L    Urea Nitrogen 27 (H) 6 - 23 mg/dL    Creatinine 0.92 0.50 - 1.05 mg/dL    eGFR 64 >60 mL/min/1.73m*2    Calcium 9.0 8.6 - 10.3 mg/dL     CT head wo IV contrast  Result Date: 7/12/2025  Interpreted By:  Mik Elise, STUDY: CT HEAD WO IV CONTRAST;  7/12/2025 6:06 pm   INDICATION: Signs/Symptoms:hallucinations.     COMPARISON: CT HEAD WO IV CONTRAST 12/19/2024   ACCESSION NUMBER(S): [id number]   ORDERING CLINICIAN: TENNILLE LAGOS   TECHNIQUE: Noncontrast axial CT scan of head was performed with coronal and sagittal reformats provided.   FINDINGS: Parenchyma: There is no acute intracranial hemorrhage. There is no mass effect or midline shift. No CT apparent acute infarct. There is an element of cortical encephalomalacia within the right lateral perirolandic opercular region. There is additional severe patchy and confluent white matter hypodensity throughout the bilateral cerebral hemispheres, favor chronic small vessel ischemic change. Suspected remote left pontine lacunar infarct.   CSF Spaces: The ventricles, sulci and basal cisterns are within normal limits for age with mild-to-moderate generalized parenchymal volume loss.   Extra-Axial Fluid: There is no extraaxial fluid collection.   Calvarium: The calvarium is unremarkable.   Paranasal sinuses: There is opacification of the visualized portions of the left maxillary paranasal sinus with hyperostosis reflecting chronic sinusitis. Hyperdense mineralized secretions versus  fungal components are present internally. The remaining visualized paranasal sinuses are well aerated.   Mastoids: Clear.   Orbits: Normal.   Soft tissues: Unremarkable.       No acute intracranial hemorrhage, mass effect, or CT apparent acute infarct.   Unchanged small remote cortical infarct within the right frontoparietal opercular region. Similar severe chronic small vessel ischemic disease.   Similar chronic left maxillary sinusitis with hyperdense secretions versus fungal components.   MACRO: None   Signed by: Mik Elise 7/12/2025 6:34 PM Dictation workstation:   [id number]                   Assessment & Plan  Acute on chronic congestive heart failure, unspecified heart failure type    COPD exacerbation (Multi)    Tobacco use disorder    Hypertension    Hypercholesterolemia    Atherosclerotic heart disease of native coronary artery with other forms of angina pectoris    Cardiac defibrillator in place    Ischemic cardiomyopathy    Hallucinations    Anxiety    CT head negative  Vistaril for anxiety  COPD stable  Pulm input noted  Wean down oxygen  Cardiology input noted  Continue diuretics for CHF  Blood pressure doing better  On oral Lasix  Plan for discharge once cleared by cardiology and pulmonary and weaned off oxygen      I spent  minutes in the professional and overall care of this patient.      Nayla Thurman MD

## 2025-07-13 NOTE — CARE PLAN
The patient's goals for the shift include      The clinical goals for the shift include rest    Ove

## 2025-07-13 NOTE — CONSULTS
Pulmonary Consultation Note   Subjective    Nevaeh Pfeiffer is a 79 y.o. year old female patient known with COPD, current smoker, hypertension, severe myalgias with statins, ischemic cardiomyopathy status post PCI to LAD in June 2022, V. tach, [employer] ICD/EF 20%, AAA, GERD,  admitted on 7/10/2025 with following shortness of breath pulmonary was consulted for COPD    History of Present Illness:  Madisyn Pfeiffer is a 79-year-old female who is a current smoker with history of COPD without PFTs on file.  Scented to [facility] due to shortness of breath that abruptly started on 7/6/2025 she was admitted on 7/10/2025 for CHF exacerbation.  Her labs were pertinent for BNP 4093, negative troponins, chest x-ray showing diffuse interstitial thickening/edema.  Small to moderate right and small left pleural effusions.  She denies cough fever or chills.  Vital signs temperature 36.4 heart rate 60 respiration 19 blood pressure 142/82 she was placed on 2 L and EMS due to saturations in the 80s and was 95% on supplemental oxygen.  Patient is currently being treated for CHF exacerbation following by cardiology, she is receiving DuoNebs 3 times daily and 20 mg IV steroids every 8 hours    Past Medical History  She has a past medical history of Abdominal aortic aneurysm (AAA) without rupture (11/12/2023), Atherosclerotic heart disease of native coronary artery with other forms of angina pectoris (11/12/2023), Benign essential hypertension (11/12/2023), CAD S/P percutaneous coronary angioplasty (11/12/2023), Cardiac defibrillator in place (11/12/2023), Chronic systolic heart failure (11/12/2023), Intermittent claudication (11/12/2023), Shortness of breath (11/12/2023), Sustained ventricular tachycardia (Multi) (11/12/2023), Tobacco use disorder (12/21/2024), and Ventricular fibrillation (Multi) (11/12/2023).    Surgical History  She has a past surgical history that includes Coronary angioplasty with stent; pacemaker placement;  "Cardiac defibrillator placement; Cholecystectomy; and Hysterectomy.     Social History  She reports that she has been smoking cigarettes. She started smoking about 61 years ago. She has a 30.8 pack-year smoking history. She has been exposed to tobacco smoke. She has never used smokeless tobacco. She reports that she does not currently use alcohol. She reports that she does not use drugs.  Cigarette smoking history:   Marijuana use:  Vaping:  Pets:   Asbestos:  Other specific exposure:      Family History  Family History[1]     Allergies  Lisinopril, Other, Penicillins, Statins-hmg-coa reductase inhibitors, and Warfarin    Review of Systems         Meds    Scheduled medications  Scheduled Medications[2]  Continuous medications  Continuous Medications[3]  PRN medications  PRN Medications[4]     Objective      Last Recorded Vitals  /68 (BP Location: Left arm, Patient Position: Lying)   Pulse 59   Temp 36.6 °C (97.9 °F) (Oral)   Resp 17   Wt 63.5 kg (140 lb)   SpO2 95%     Blood pressure 125/68, pulse 59, temperature 36.6 °C (97.9 °F), temperature source Oral, resp. rate 17, height 1.626 m (5' 4\"), weight 63.5 kg (140 lb), SpO2 95%.   Physical Exam   GENERAL: normal appearance. well nourished. No respiratory distress  HEAD/SINUSES: 2 L nasal cannula  LUNGS: Crackles in the bilateral bases no wheezing no rhonchi  CARDIAC: Regular rate and rhythm  EXTREMITIES: No edema  NEURO: grossly normal mental status, CN reflexes and motor strength.   SKIN: Defer  PSYCH: Normal affect  No intake or output data in the 24 hours ending 07/13/25 0801  Labs:   Results from last 72 hours   Lab Units 07/13/25  0552 07/12/25  1006 07/11/25  0556   SODIUM mmol/L 138 139 142   POTASSIUM mmol/L 4.2 3.9 3.7   CHLORIDE mmol/L 104 106 107   CO2 mmol/L 28 26 26   BUN mg/dL 27* 24* 18   CREATININE mg/dL 0.92 1.02 0.79   GLUCOSE mg/dL 133* 154* 147*   CALCIUM mg/dL 9.0 8.5* 8.8   ANION GAP mmol/L 10 11 13   EGFR mL/min/1.73m*2 64 56* 77    "   Results from last 72 hours   Lab Units 07/13/25  0552 07/12/25  1006 07/11/25  0556 07/10/25  2237   WBC AUTO x10*3/uL 15.8* 12.4* 7.0 10.7   HEMOGLOBIN g/dL 14.5 13.7 15.5 14.8   HEMATOCRIT % 42.5 40.5 45.1 45.3   PLATELETS AUTO x10*3/uL 322 285 293 314   NEUTROS PCT AUTO %  --   --   --  74.0   LYMPHS PCT AUTO %  --   --   --  14.8   MONOS PCT AUTO %  --   --   --  5.1   EOS PCT AUTO %  --   --   --  5.1               Micro/ID:   Lab Results   Component Value Date    URINECULTURE (A) 11/06/2024     Multiple organisms present, probable contamination. Repeat culture if clinically indicated.     Summary of key imaging results from the last 24 hours  CXR -CHF with pulmonary edema and bilateral pleural effusions    Impression   Nevaeh Pfeiffer is a 79 y.o. year old female patient is being seen by the pulmonary service for   Acute respiratory failure with hypoxia  COPD -does not follow with a pulmonologist no PFTs on file.  Does not use home oxygen, is not on inhalers  Pleural effusions  Acute on chronic systolic heart failure  Ischemic cardiomyopathy  Current smoker  Leukocytosis -likely secondary to steroids    Recommendations   As follows:  Does not appear to be in acute exacerbation of COPD -has been treated with IV steroids for the last 3 days will finish a 5-day course with prednisone  Can continue DuoNebs 3 times daily -will need triple inhaler therapy on discharge with albuterol as needed  Follow-up with pulmonary outpatient  Outpatient PFTs  Last CT chest December 2024 recommend follow-up in 4 to 5 months  Nicotine patch    SHERIDAN Herrera-CNP   07/13/25 at 8:01 AM     -Only the Medical problems listed under impression were addressed today.   -Please contact primary team for all other concerns and medical problem  -Thank you for your consult     Disclaimer: Documentation completed with the information available at the time of input. Parts of this note may have been scribed or generated using voice  dictation software, Dragon.  Homophonic errors may exist.  Please contact me directly if clarification is needed. The times in the chart may not be reflective of actual patient care times, interventions, or procedures. Documentation occurs after the physical care of the patient.           [1]   Family History  Problem Relation Name Age of Onset    Cancer Mother  61    Heart attack Father  67   [2] amiodarone, 200 mg, oral, BID  aspirin, 81 mg, oral, Daily  cholecalciferol, 25 mcg, oral, Daily  dapagliflozin propanediol, 10 mg, oral, Daily  docusate sodium, 100 mg, oral, BID  enoxaparin, 40 mg, subcutaneous, Daily  ezetimibe, 10 mg, oral, Daily  furosemide, 40 mg, oral, Daily  ipratropium-albuteroL, 3 mL, nebulization, Once  ipratropium-albuteroL, 3 mL, nebulization, TID  melatonin, 3 mg, oral, Nightly  methylPREDNISolone sodium succinate (PF), 20 mg, intravenous, q8h  metoprolol succinate XL, 50 mg, oral, Daily  mexiletine, 150 mg, oral, q8h  pantoprazole, 40 mg, oral, Daily before breakfast   Or  pantoprazole, 40 mg, intravenous, Daily before breakfast  polyethylene glycol, 17 g, oral, Daily  potassium chloride CR, 20 mEq, oral, Daily  pravastatin, 80 mg, oral, Daily  sacubitriL-valsartan, 0.5 tablet, oral, BID  [3]    [4] PRN medications: acetaminophen **OR** acetaminophen **OR** acetaminophen, benzocaine-menthol, dextromethorphan-guaifenesin, guaiFENesin, hydrOXYzine HCL, ondansetron **OR** ondansetron, oxygen

## 2025-07-13 NOTE — PROGRESS NOTES
Nevaeh Pfeiffer is a 79 y.o. female on day 2 of admission presenting with Acute on chronic congestive heart failure, unspecified heart failure type.    Subjective   Patient laying comfortably in bed.  Denies chest pain, palpitations, pressure.  Denies worsening shortness of breath.  Patient was on 2 L nasal cannula, decreased it down to 1 L nasal cannula.  Patient had some confusion and hallucinations overnight, she was moved into her 's room, 406.        Objective     Physical Exam  Vitals and nursing note reviewed.   Constitutional:       General: She is not in acute distress.  HENT:      Head: Normocephalic and atraumatic.      Nose: Nose normal.      Mouth/Throat:      Mouth: Mucous membranes are moist.      Pharynx: Oropharynx is clear.   Eyes:      Extraocular Movements: Extraocular movements intact.   Cardiovascular:      Heart sounds: No murmur heard.     No friction rub. No gallop.   Pulmonary:      Effort: No respiratory distress.      Breath sounds: No stridor. No wheezing, rhonchi or rales.      Comments: Decreased oxygen to 1 L nasal cannula.  On auscultation of lungs all lobes are clear  Chest:      Chest wall: No tenderness.   Abdominal:      General: Bowel sounds are normal.      Palpations: Abdomen is soft.   Musculoskeletal:         General: No swelling. Normal range of motion.      Cervical back: Normal range of motion and neck supple.      Right lower leg: No edema.      Left lower leg: No edema.   Skin:     General: Skin is warm and dry.      Capillary Refill: Capillary refill takes less than 2 seconds.   Neurological:      Mental Status: She is alert and oriented to person, place, and time.   Psychiatric:         Mood and Affect: Mood normal.         Behavior: Behavior normal.         Thought Content: Thought content normal.         Judgment: Judgment normal.         Last Recorded Vitals  Blood pressure 125/68, pulse 59, temperature 36.6 °C (97.9 °F), temperature source Oral, resp. rate  "17, height 1.626 m (5' 4\"), weight 63.5 kg (140 lb), SpO2 95%.  Intake/Output last 3 Shifts:  I/O last 3 completed shifts:  In: - (0 mL/kg)   Out: 240 (3.8 mL/kg) [Urine:240 (0.1 mL/kg/hr)]  Weight: 63.5 kg     Relevant Results  Results for orders placed or performed during the hospital encounter of 07/10/25 (from the past 24 hours)   Comprehensive metabolic panel   Result Value Ref Range    Glucose 154 (H) 74 - 99 mg/dL    Sodium 139 136 - 145 mmol/L    Potassium 3.9 3.5 - 5.3 mmol/L    Chloride 106 98 - 107 mmol/L    Bicarbonate 26 21 - 32 mmol/L    Anion Gap 11 10 - 20 mmol/L    Urea Nitrogen 24 (H) 6 - 23 mg/dL    Creatinine 1.02 0.50 - 1.05 mg/dL    eGFR 56 (L) >60 mL/min/1.73m*2    Calcium 8.5 (L) 8.6 - 10.3 mg/dL    Albumin 3.5 3.4 - 5.0 g/dL    Alkaline Phosphatase 107 33 - 136 U/L    Total Protein 5.8 (L) 6.4 - 8.2 g/dL    AST 11 9 - 39 U/L    Bilirubin, Total 0.6 0.0 - 1.2 mg/dL    ALT 11 7 - 45 U/L   CBC   Result Value Ref Range    WBC 12.4 (H) 4.4 - 11.3 x10*3/uL    nRBC 0.0 0.0 - 0.0 /100 WBCs    RBC 4.39 4.00 - 5.20 x10*6/uL    Hemoglobin 13.7 12.0 - 16.0 g/dL    Hematocrit 40.5 36.0 - 46.0 %    MCV 92 80 - 100 fL    MCH 31.2 26.0 - 34.0 pg    MCHC 33.8 32.0 - 36.0 g/dL    RDW 14.7 (H) 11.5 - 14.5 %    Platelets 285 150 - 450 x10*3/uL   CBC   Result Value Ref Range    WBC 15.8 (H) 4.4 - 11.3 x10*3/uL    nRBC 0.0 0.0 - 0.0 /100 WBCs    RBC 4.62 4.00 - 5.20 x10*6/uL    Hemoglobin 14.5 12.0 - 16.0 g/dL    Hematocrit 42.5 36.0 - 46.0 %    MCV 92 80 - 100 fL    MCH 31.4 26.0 - 34.0 pg    MCHC 34.1 32.0 - 36.0 g/dL    RDW 14.9 (H) 11.5 - 14.5 %    Platelets 322 150 - 450 x10*3/uL   Basic Metabolic Panel   Result Value Ref Range    Glucose 133 (H) 74 - 99 mg/dL    Sodium 138 136 - 145 mmol/L    Potassium 4.2 3.5 - 5.3 mmol/L    Chloride 104 98 - 107 mmol/L    Bicarbonate 28 21 - 32 mmol/L    Anion Gap 10 10 - 20 mmol/L    Urea Nitrogen 27 (H) 6 - 23 mg/dL    Creatinine 0.92 0.50 - 1.05 mg/dL    eGFR 64 >60 " mL/min/1.73m*2    Calcium 9.0 8.6 - 10.3 mg/dL      Assessment & Plan  Acute on chronic congestive heart failure, unspecified heart failure type    Cardiac defibrillator in place    Atherosclerotic heart disease of native coronary artery with other forms of angina pectoris    Hypertension    Ischemic cardiomyopathy    Hypercholesterolemia    Tobacco use disorder    COPD exacerbation (Multi)    Hallucinations    Anxiety    Acute hypoxemic respiratory failure   Acute on chronic systolic heart failure (NYHA class II-III)  Medtronic ICD in situ- EF 20% for primary and secondary prevention  Hypertension  Ischemic cardiomyopathy s/p PCI to LAD in June 2022  History of nonsustained ventricular tachycardia- Follows with electrophysiologist, Dr. Tripathi, on mexiletine and amiodarone  COPD  HX AAA  Current smoker           7/12: As above, patient presenting with shortness of breath.  Patient states that her shortness of breath came up suddenly on Sunday 7/6/25.  States that she was vacuuming and was feeling short of breath and lightheaded.  She said that she had to take a break and sat for about 10 minutes.  She still felt weak and was slightly diaphoretic and called the EMS.  Patient does not wear oxygen at home and is a current smoker. On arrival to the ED patient was found to be hypoxic with sats in the 80s on room air.  Patient denies chest pain, palpitations, pressure, or tightness.  Patient denies worsening shortness of breath, on 2 L nasal cannula.            Echocardiogram was completed in December 2024 that showed an EF of 15 to 20%, abnormal wall motion, spectral Doppler showed an abnormal pattern of left ventricular diastolic filling, severely dilated left ventricular cavity, severe global LV hypokinesis with with akinesis of the anterosptal wall and LV apex, normal right ventricular global systolic function, mild mitral valve regurgitation.               Most recent charted vitals show a heart rate of 59, blood  pressure 110/59, satting 92% on 2 L nasal cannula.  On telemetry patient is in a biventricular paced rhythm with heart rates in the 60s.  There were no labs obtained over the past 24 hours; ordered new CMP and CBC. Admitting team has reordered patient's home medication of amiodarone 100 mg twice daily, aspirin 81 mg daily, Farxiga 10 mg daily, Zetia 10 mg daily, metoprolol succinate 50 mg daily, mexiletine 150 mg q8H, pravastatin 80 mg daily, and Entresto. Patient does not take any diuretics at home. According to her cardiologist, Dr. Moscoso, patient is currently off Plavix and only taking aspirin due to easy bruising and bleeding. She feels improved today, still on 2 L NC but on exam lung sounds are clear on auscultation in all lobes. Please ween oxygen as tolerated by the patient.  Will obtain another chest x-ray.  patient also on exam shows no evidence of lower extremity swelling. Will stop IV lasix and start patient on oral lasix 40 mg daily. I/O has not been obtained. Please monitor I/O. We will continue to follow with you.     7/13: Yesterday evening around 1700, patient became anxious, intermittently confused and was having hallucinations. CT of the head was obtained, was negative, no acute intracranial hemorrhage, mass effect, or apparent acute infarct. Noted on the CT was unchanged small remote cortical infarct within the right frontoparietal opercular region. Similar severe chronic small vessel ischemic disease. Also noted chronic left maxillary sinusitis with hyperdense secretions versus fungal  components.  Chest x-ray was obtained yesterday, results are still pending. I/O show net loss of -60. Decreased oxygen to 1 L nasal cannula.  On auscultation of lungs, all lobes are clear throughout. Encouraged patient to take a walk today with and without her oxygen to see how she feels and to see if her saturation decreases.    Vitals this morning show a heart rate of 59, blood pressure 125/68, satting 95% on 2 L  nasal cannula.  On telemetry patient is in a biventricular paced rhythm with heart rates in the 60s. Labs this morning show a potassium of 4.2, BUN 27, creatinine 0.92, WBC 15.8, hemoglobin/hematocrit 14.5/42.5. Continue amiodarone 200 mg twice daily, aspirin 81 mg daily, Farxiga 10 mg daily, Zetia 10 mg daily, oral Lasix 40 mg daily, metoprolol succinate 50 mg daily, mexiletine 150 mg q8H, pravastatin 80 mg daily, and Entresto twice daily.       I spent 35 minutes in the professional and overall care of this patient.      Suyapa Dent, APRN-CNP

## 2025-07-14 ENCOUNTER — DOCUMENTATION (OUTPATIENT)
Dept: CARDIAC REHAB | Facility: HOSPITAL | Age: 80
End: 2025-07-14
Payer: MEDICARE

## 2025-07-14 ENCOUNTER — PHARMACY VISIT (OUTPATIENT)
Dept: PHARMACY | Facility: CLINIC | Age: 80
End: 2025-07-14
Payer: MEDICARE

## 2025-07-14 VITALS
SYSTOLIC BLOOD PRESSURE: 119 MMHG | BODY MASS INDEX: 23.9 KG/M2 | DIASTOLIC BLOOD PRESSURE: 73 MMHG | HEIGHT: 64 IN | WEIGHT: 140 LBS | RESPIRATION RATE: 20 BRPM | HEART RATE: 60 BPM | OXYGEN SATURATION: 91 % | TEMPERATURE: 97.2 F

## 2025-07-14 LAB
ANION GAP SERPL CALCULATED.3IONS-SCNC: 9 MMOL/L (ref 10–20)
BUN SERPL-MCNC: 30 MG/DL (ref 6–23)
CALCIUM SERPL-MCNC: 8.7 MG/DL (ref 8.6–10.3)
CHLORIDE SERPL-SCNC: 105 MMOL/L (ref 98–107)
CO2 SERPL-SCNC: 30 MMOL/L (ref 21–32)
CREAT SERPL-MCNC: 0.86 MG/DL (ref 0.5–1.05)
EGFRCR SERPLBLD CKD-EPI 2021: 69 ML/MIN/1.73M*2
ERYTHROCYTE [DISTWIDTH] IN BLOOD BY AUTOMATED COUNT: 14.8 % (ref 11.5–14.5)
GLUCOSE SERPL-MCNC: 88 MG/DL (ref 74–99)
HCT VFR BLD AUTO: 44.3 % (ref 36–46)
HGB BLD-MCNC: 14.9 G/DL (ref 12–16)
MCH RBC QN AUTO: 31.1 PG (ref 26–34)
MCHC RBC AUTO-ENTMCNC: 33.6 G/DL (ref 32–36)
MCV RBC AUTO: 93 FL (ref 80–100)
NRBC BLD-RTO: 0 /100 WBCS (ref 0–0)
PLATELET # BLD AUTO: 303 X10*3/UL (ref 150–450)
POTASSIUM SERPL-SCNC: 3.6 MMOL/L (ref 3.5–5.3)
PROCALCITONIN SERPL-MCNC: 0.02 NG/ML
RBC # BLD AUTO: 4.79 X10*6/UL (ref 4–5.2)
SODIUM SERPL-SCNC: 140 MMOL/L (ref 136–145)
WBC # BLD AUTO: 13.3 X10*3/UL (ref 4.4–11.3)

## 2025-07-14 PROCEDURE — 80048 BASIC METABOLIC PNL TOTAL CA: CPT | Performed by: INTERNAL MEDICINE

## 2025-07-14 PROCEDURE — 2500000002 HC RX 250 W HCPCS SELF ADMINISTERED DRUGS (ALT 637 FOR MEDICARE OP, ALT 636 FOR OP/ED)

## 2025-07-14 PROCEDURE — 99238 HOSP IP/OBS DSCHRG MGMT 30/<: CPT | Performed by: INTERNAL MEDICINE

## 2025-07-14 PROCEDURE — 85027 COMPLETE CBC AUTOMATED: CPT | Performed by: INTERNAL MEDICINE

## 2025-07-14 PROCEDURE — 36415 COLL VENOUS BLD VENIPUNCTURE: CPT | Performed by: INTERNAL MEDICINE

## 2025-07-14 PROCEDURE — 2500000001 HC RX 250 WO HCPCS SELF ADMINISTERED DRUGS (ALT 637 FOR MEDICARE OP): Performed by: INTERNAL MEDICINE

## 2025-07-14 PROCEDURE — 2500000004 HC RX 250 GENERAL PHARMACY W/ HCPCS (ALT 636 FOR OP/ED): Performed by: INTERNAL MEDICINE

## 2025-07-14 PROCEDURE — 99232 SBSQ HOSP IP/OBS MODERATE 35: CPT | Performed by: PHYSICIAN ASSISTANT

## 2025-07-14 PROCEDURE — 2500000004 HC RX 250 GENERAL PHARMACY W/ HCPCS (ALT 636 FOR OP/ED)

## 2025-07-14 PROCEDURE — 2500000002 HC RX 250 W HCPCS SELF ADMINISTERED DRUGS (ALT 637 FOR MEDICARE OP, ALT 636 FOR OP/ED): Performed by: INTERNAL MEDICINE

## 2025-07-14 PROCEDURE — 84145 PROCALCITONIN (PCT): CPT | Mod: WESLAB

## 2025-07-14 PROCEDURE — 2500000001 HC RX 250 WO HCPCS SELF ADMINISTERED DRUGS (ALT 637 FOR MEDICARE OP)

## 2025-07-14 PROCEDURE — S4991 NICOTINE PATCH NONLEGEND: HCPCS

## 2025-07-14 PROCEDURE — RXMED WILLOW AMBULATORY MEDICATION CHARGE

## 2025-07-14 RX ORDER — ALBUTEROL SULFATE 90 UG/1
2 INHALANT RESPIRATORY (INHALATION) EVERY 4 HOURS PRN
Qty: 8.5 G | Refills: 5 | Status: SHIPPED | OUTPATIENT
Start: 2025-07-14

## 2025-07-14 RX ORDER — IBUPROFEN 200 MG
1 TABLET ORAL DAILY
Qty: 30 PATCH | Refills: 0 | Status: SHIPPED | OUTPATIENT
Start: 2025-07-15 | End: 2025-08-14

## 2025-07-14 RX ORDER — TORSEMIDE 10 MG/1
10 TABLET ORAL DAILY
Qty: 90 TABLET | Refills: 3 | Status: SHIPPED | OUTPATIENT
Start: 2025-07-15

## 2025-07-14 RX ORDER — SPIRONOLACTONE 25 MG/1
12.5 TABLET ORAL DAILY
Status: DISCONTINUED | OUTPATIENT
Start: 2025-07-15 | End: 2025-07-14 | Stop reason: HOSPADM

## 2025-07-14 RX ORDER — POTASSIUM CHLORIDE 20 MEQ/1
20 TABLET, EXTENDED RELEASE ORAL DAILY
Qty: 30 TABLET | Refills: 0 | Status: SHIPPED | OUTPATIENT
Start: 2025-07-15 | End: 2025-08-14

## 2025-07-14 RX ORDER — PREDNISONE 20 MG/1
40 TABLET ORAL DAILY
Qty: 2 TABLET | Refills: 0 | Status: SHIPPED | OUTPATIENT
Start: 2025-07-15 | End: 2025-07-16

## 2025-07-14 RX ORDER — IPRATROPIUM BROMIDE AND ALBUTEROL SULFATE 2.5; .5 MG/3ML; MG/3ML
3 SOLUTION RESPIRATORY (INHALATION)
Start: 2025-07-14

## 2025-07-14 RX ORDER — HYDROXYZINE HYDROCHLORIDE 25 MG/1
25 TABLET, FILM COATED ORAL EVERY 6 HOURS PRN
Qty: 40 TABLET | Refills: 0 | Status: SHIPPED | OUTPATIENT
Start: 2025-07-14 | End: 2025-08-13

## 2025-07-14 RX ORDER — FLUTICASONE FUROATE, UMECLIDINIUM BROMIDE AND VILANTEROL TRIFENATATE 100; 62.5; 25 UG/1; UG/1; UG/1
1 POWDER RESPIRATORY (INHALATION) DAILY
Qty: 60 EACH | Refills: 0 | Status: SHIPPED | OUTPATIENT
Start: 2025-07-14

## 2025-07-14 RX ORDER — TORSEMIDE 20 MG/1
10 TABLET ORAL DAILY
Status: DISCONTINUED | OUTPATIENT
Start: 2025-07-15 | End: 2025-07-14 | Stop reason: HOSPADM

## 2025-07-14 RX ORDER — SPIRONOLACTONE 25 MG/1
12.5 TABLET ORAL DAILY
Qty: 45 TABLET | Refills: 3 | Status: SHIPPED | OUTPATIENT
Start: 2025-07-15

## 2025-07-14 RX ORDER — FUROSEMIDE 40 MG/1
40 TABLET ORAL DAILY
Qty: 30 TABLET | Refills: 0 | Status: SHIPPED | OUTPATIENT
Start: 2025-07-15 | End: 2025-07-14 | Stop reason: HOSPADM

## 2025-07-14 RX ADMIN — ENOXAPARIN SODIUM 40 MG: 100 INJECTION SUBCUTANEOUS at 09:02

## 2025-07-14 RX ADMIN — DAPAGLIFLOZIN 10 MG: 10 TABLET, FILM COATED ORAL at 09:08

## 2025-07-14 RX ADMIN — NICOTINE 1 PATCH: 14 PATCH, EXTENDED RELEASE TRANSDERMAL at 09:03

## 2025-07-14 RX ADMIN — Medication 25 MCG: at 09:03

## 2025-07-14 RX ADMIN — AMIODARONE HYDROCHLORIDE 200 MG: 200 TABLET ORAL at 09:03

## 2025-07-14 RX ADMIN — PREDNISONE 40 MG: 20 TABLET ORAL at 09:03

## 2025-07-14 RX ADMIN — MEXILETINE HYDROCHLORIDE 150 MG: 150 CAPSULE ORAL at 13:49

## 2025-07-14 RX ADMIN — ASPIRIN 81 MG: 81 TABLET, DELAYED RELEASE ORAL at 09:03

## 2025-07-14 RX ADMIN — SACUBITRIL AND VALSARTAN 0.5 TABLET: 24; 26 TABLET, FILM COATED ORAL at 09:03

## 2025-07-14 RX ADMIN — DOCUSATE SODIUM 100 MG: 100 CAPSULE, LIQUID FILLED ORAL at 09:03

## 2025-07-14 RX ADMIN — PRAVASTATIN SODIUM 80 MG: 20 TABLET ORAL at 09:03

## 2025-07-14 RX ADMIN — MEXILETINE HYDROCHLORIDE 150 MG: 150 CAPSULE ORAL at 06:18

## 2025-07-14 RX ADMIN — METOPROLOL SUCCINATE 50 MG: 50 TABLET, EXTENDED RELEASE ORAL at 09:02

## 2025-07-14 RX ADMIN — FUROSEMIDE 40 MG: 40 TABLET ORAL at 09:02

## 2025-07-14 RX ADMIN — EZETIMIBE 10 MG: 10 TABLET ORAL at 09:03

## 2025-07-14 RX ADMIN — POTASSIUM CHLORIDE EXTENDED-RELEASE 20 MEQ: 1500 TABLET ORAL at 09:02

## 2025-07-14 RX ADMIN — PANTOPRAZOLE SODIUM 40 MG: 40 TABLET, DELAYED RELEASE ORAL at 06:17

## 2025-07-14 ASSESSMENT — COGNITIVE AND FUNCTIONAL STATUS - GENERAL
CLIMB 3 TO 5 STEPS WITH RAILING: A LITTLE
DAILY ACTIVITIY SCORE: 24
MOBILITY SCORE: 23

## 2025-07-14 ASSESSMENT — PAIN SCALES - GENERAL: PAINLEVEL_OUTOF10: 0 - NO PAIN

## 2025-07-14 NOTE — PROGRESS NOTES
CHF education reviewed with patient. Smoking cessation education/handout given to patient. Cardiac Rehab education/handout given to patient. Patient encouraged to call cardiac rehab post discharge.

## 2025-07-14 NOTE — CARE PLAN
The patient's goals for the shift include      The clinical goals for the shift include Pt will be HDS

## 2025-07-14 NOTE — PROGRESS NOTES
Spiritual Care Visit  Spiritual Care Request    Reason for Visit:  Routine Visit: Follow-up     Request Received From:       Focus of Care:  Visited With: Patient         Refer to :          Spiritual Care Assessment    Spiritual Assessment:                      Care Provided:       Sense of Community and or Rastafarian Affiliation:  Baptism   Values/Beliefs  Spiritual Requests During Hospitalization: Nevaeh and her  were having patient care     Addressed Needs/Concerns and/or Jose [place] Through:          Outcome:        Advance Directives:         Spiritual Care Annotation    Annotation:  Nevaeh and her  were having patient care today.  Abelino Godwin

## 2025-07-14 NOTE — CONSULTS
Heart Failure Nurse Navigator    The role of the HF nurse navigator is to (1) characterize risk profiles of patients with heart failure transitioning from [id number] after hospitalization, (2) recommend interventions to improve care and reduce risks of worse post-hospitalization outcomes. Potential recommendations may touch base on patient/family education, additional consults that may bring value, and appointment scheduling.    History  Past medical history includes hypertension, ischemic cardiomyopathy s/p PCI to LAD in June 2022 and has a hx of PCI in 2003 s/p AWMI-NSVT at that time, Ventricular tachycardia, Medtronic ICD in situ- EF 20% for primary and secondary prevention, systolic heart failure (NYHA class II-III), AAA, GERD, COPD, severe myalgias with different statins, current smoker.  Follows with electrophysiologist, Dr. Tripathi, on mexiletine.  Echocardiogram was completed in December 2024 that showed an EF of 15 to 20%, abnormal wall motion, spectral Doppler showed an abnormal pattern of left ventricular diastolic filling, severely dilated left ventricular cavity, severe global LV hypokinesis with with akinesis of the anterosptal wall and LV apex, normal right ventricular global systolic function, mild mitral valve regurgitation.   This admission BNP 4093.     Patients Cardiologist(s): Dr. Moscoso    Patients Primary Care Provider: Dr. Thurman    1. Medical Domain  What is the patient's most recent LVEF? 15-20%  HFrEF (LVEF <= 40%) Quadruple therapy recommended  HFmrEF (LVEF 41-49%) Quadruple therapy recommended  HFpEF (LVEF >= 50%) Minimum recommendations: SGLT2i and MRA  Is the patient on OP GDMT for their condition?   ARNI/ACEI/ARB: Yes Sacubitril-valsartan 24-26 mg 0.5 tablet BID  BB: Yes Metoprolol succinate 50 mg daily  MRA: No None  SGLT2i: Yes dapaglifozin 10 mg daily  Is the patient prescribed a diuretic? No  None  Does this patient have an implanted device (ie cardioMEMS, ICD,  CRT-D)?  Device type: Pacer/ICD  Could this patient have advanced heart failure (Stage D heart failure)?: Yes   If yes, the potential markers of advanced heart failure include: LVEF<=25%    2. Mind and Emotion  Does this patient have possible cognitive impairment?: No   Does this patient have major depression?: No   3. Physical Function  Could this patient be frail?: No   Defined by presence of all of these: slowness, weakness, shrinking, inactivity, exhaustion  Is this patient at risk for falling?: No   Defined by having experienced a fall in the last 12 months.    4. Social Determinants of Health  Transportation deficits?: No Daughter drives her.  Lack of insurance?: No   Poor financial resources?: No   Living conditions (homelessness, unstable home)?: No   Poor family/social support?: No   Poor personal care?: No   Food insecurity?: No   Lack of HCPOA?: Yes    I provided the following heart failure education:  - Living With Heart Failure book provided.  - HF signs and symptoms, heart failure zones and when to call cardiologist.   - Controlling Heart Failure at Home: medication adherence, following up with cardiologist at least once yearly, staying healthy and active, limiting sodium and fluid intake as directed by cardiologist.  - Daily Weight Education    Assessment  I met with Nevaeh Pfeiffer at the bedside, and my impressions include: Pt lying in bed, on RA. She states she was not having any swelling in her legs or arm but was having difficulty breathing(hypoxia) which brought her to the hospital. She is compliant with her meds and follow up appts. We reviewed all HF education as well as smoking cessation info and pt is agreeable to me following after DC. M2B and HHVC declined.      IP Medications:  ARNi/ACEi/ARB: Sacubitril-valsartan 24-26 mg 0.5 tablet BID  BB: Metoprolol succinate 50 mg daily  MRA: None/Hypotension-low SBP  SGLT2i: dapaglifozin 10 mg daily  Diuretic: Furosemide 40 mg daily    Recommendations/  Plan  Recommend scheduling an appointment with a heart failure cardiologist post-discharge (Patient is not on complete regimen of GDMT for heart failure).  Follow up with cards as recommended, take all meds, keep salt and salty foods to a minimum, practice daily wts and monitor for HF symptoms and report them promptly to you cardiologist.      *Cardiology Discharge Appointment Follow-up Plan: Msg sent to office of Dr. Moscoso to schedule follow up appt.    Sirisha Maciel RN BSN  [facility] Clinical Nurse Navigator, CHF  [phone]

## 2025-07-14 NOTE — PROGRESS NOTES
"Nevaeh Pfeiffer is a 79 y.o. female on day 3 of admission presenting with Acute on chronic congestive heart failure, unspecified heart failure type.    Subjective   Alert and oriented, laying in bed comfortably.  Patient denies chest pain, palpitations, shortness of breath, lower extremity edema, or nausea.       Objective     Physical Exam    Appearance: Alert, oriented, cooperative, in no acute distress.     Eyes: Conjunctiva pink with no redness or exudates. Eyelids without lesions. No scleral icterus.     ENT: Hearing grossly intact. External inspection of ears without lesions or erythema. no nasal flaring. no tripoding, no drooling, no difficulty swallowing oral secretions.    Pulmonary: Diminished breath sounds in all lobes bilaterally. No rales, rhonchi or wheezing. No accessory muscle use or stridor. No difficulty completing a full sentence without taking a breath    Cardiac: regular rhythm, normal rate no rub, gallop. No JVD.    Musculoskeletal: No cyanosis, clubbing.  No lower extremity edema.  Capillary refill less than 2 seconds in lower extremities    Neurological: no focal findings identified.    Psychiatric: Appropriate mood and affect.      Last Recorded Vitals  Blood pressure 119/73, pulse 60, temperature 36.2 °C (97.2 °F), temperature source Oral, resp. rate 20, height 1.626 m (5' 4\"), weight 63.5 kg (140 lb), SpO2 91%.  Intake/Output last 3 Shifts:  No intake/output data recorded.    Relevant Results  Results for orders placed or performed during the hospital encounter of 07/10/25 (from the past 24 hours)   CBC   Result Value Ref Range    WBC 13.3 (H) 4.4 - 11.3 x10*3/uL    nRBC 0.0 0.0 - 0.0 /100 WBCs    RBC 4.79 4.00 - 5.20 x10*6/uL    Hemoglobin 14.9 12.0 - 16.0 g/dL    Hematocrit 44.3 36.0 - 46.0 %    MCV 93 80 - 100 fL    MCH 31.1 26.0 - 34.0 pg    MCHC 33.6 32.0 - 36.0 g/dL    RDW 14.8 (H) 11.5 - 14.5 %    Platelets 303 150 - 450 x10*3/uL   Basic Metabolic Panel   Result Value Ref Range    " Glucose 88 74 - 99 mg/dL    Sodium 140 136 - 145 mmol/L    Potassium 3.6 3.5 - 5.3 mmol/L    Chloride 105 98 - 107 mmol/L    Bicarbonate 30 21 - 32 mmol/L    Anion Gap 9 (L) 10 - 20 mmol/L    Urea Nitrogen 30 (H) 6 - 23 mg/dL    Creatinine 0.86 0.50 - 1.05 mg/dL    eGFR 69 >60 mL/min/1.73m*2    Calcium 8.7 8.6 - 10.3 mg/dL     Scheduled medications  Scheduled Medications[1]  Continuous medications  Continuous Medications[2]  PRN medications  PRN Medications[3]      Assessment & Plan  Acute on chronic congestive heart failure, unspecified heart failure type    Cardiac defibrillator in place    Atherosclerotic heart disease of native coronary artery with other forms of angina pectoris    Hypertension    Ischemic cardiomyopathy    Hypercholesterolemia    Tobacco use disorder    COPD exacerbation (Multi)    Hallucinations    Anxiety    Acute hypoxemic respiratory failure-management per admitting/pulmonary  Acute on chronic systolic heart failure (NYHA class II-III)-continue on GDMT, ICD  Medtronic ICD in situ- EF 20% for primary and secondary prevention  Hypertension-controlled with metoprolol succinate, furosemide  Ischemic cardiomyopathy s/p PCI to LAD in June 2022  History of nonsustained ventricular tachycardia- Follows with electrophysiologist, Dr. Tripathi, on mexiletine and amiodarone  COPD-management per pulmonary  HX AAA-repeat imaging as scheduled  Current smoker        7/12: As above, patient presenting with shortness of breath.  Patient states that her shortness of breath came up suddenly on Sunday 7/6/25.  States that she was vacuuming and was feeling short of breath and lightheaded.  She said that she had to take a break and sat for about 10 minutes.  She still felt weak and was slightly diaphoretic and called the EMS.  Patient does not wear oxygen at home and is a current smoker. On arrival to the ED patient was found to be hypoxic with sats in the 80s on room air.  Patient denies chest pain, palpitations,  pressure, or tightness.  Patient denies worsening shortness of breath, on 2 L nasal cannula.            Echocardiogram was completed in December 2024 that showed an EF of 15 to 20%, abnormal wall motion, spectral Doppler showed an abnormal pattern of left ventricular diastolic filling, severely dilated left ventricular cavity, severe global LV hypokinesis with with akinesis of the anterosptal wall and LV apex, normal right ventricular global systolic function, mild mitral valve regurgitation.               Most recent charted vitals show a heart rate of 59, blood pressure 110/59, satting 92% on 2 L nasal cannula.  On telemetry patient is in a biventricular paced rhythm with heart rates in the 60s.  There were no labs obtained over the past 24 hours; ordered new CMP and CBC. Admitting team has reordered patient's home medication of amiodarone 100 mg twice daily, aspirin 81 mg daily, Farxiga 10 mg daily, Zetia 10 mg daily, metoprolol succinate 50 mg daily, mexiletine 150 mg q8H, pravastatin 80 mg daily, and Entresto. Patient does not take any diuretics at home. According to her cardiologist, Dr. Moscoso, patient is currently off Plavix and only taking aspirin due to easy bruising and bleeding. She feels improved today, still on 2 L NC but on exam lung sounds are clear on auscultation in all lobes. Please ween oxygen as tolerated by the patient.  Will obtain another chest x-ray.  patient also on exam shows no evidence of lower extremity swelling. Will stop IV lasix and start patient on oral lasix 40 mg daily. I/O has not been obtained. Please monitor I/O. We will continue to follow with you.      7/13: Yesterday evening around 1700, patient became anxious, intermittently confused and was having hallucinations. CT of the head was obtained, was negative, no acute intracranial hemorrhage, mass effect, or apparent acute infarct. Noted on the CT was unchanged small remote cortical infarct within the right frontoparietal  opercular region. Similar severe chronic small vessel ischemic disease. Also noted chronic left maxillary sinusitis with hyperdense secretions versus fungal  components.  Chest x-ray was obtained yesterday, results are still pending. I/O show net loss of -60. Decreased oxygen to 1 L nasal cannula.  On auscultation of lungs, all lobes are clear throughout. Encouraged patient to take a walk today with and without her oxygen to see how she feels and to see if her saturation decreases.               Vitals this morning show a heart rate of 59, blood pressure 125/68, satting 95% on 2 L nasal cannula.  On telemetry patient is in a biventricular paced rhythm with heart rates in the 60s. Labs this morning show a potassium of 4.2, BUN 27, creatinine 0.92, WBC 15.8, hemoglobin/hematocrit 14.5/42.5. Continue amiodarone 200 mg twice daily, aspirin 81 mg daily, Farxiga 10 mg daily, Zetia 10 mg daily, oral Lasix 40 mg daily, metoprolol succinate 50 mg daily, mexiletine 150 mg q8H, pravastatin 80 mg daily, and Entresto twice daily.     7/14: Unclear why intakes and outputs have not been recorded the past 2 days, last recording from 7/11 loss of 60 mL.  Vital signs and labs reviewed, patient is normotensive 119/73, heart rate 60, 91% on room air, no electrolyte abnormalities found on labs, BUN 30, creatinine 0.86, WBCs remain elevated 13.3, hemoglobin 14.9, hematocrit 44.3.  Patient reports feeling well, denies any difficulty breathing, chest heaviness or pain.  Chest x-ray reviewed from 7/13, significant improvement of pulmonary edema/congestion.  Discussed patient with Dr. Garcia, would continue patient on diuretic in the out patient setting with spirolactone 12.5 mg and torsemide 10 mg once daily; in addition to amiodarone 200 mg twice daily, aspirin 81 mg daily, Farxiga 10 mg daily, Zetia 10 mg daily, metoprolol succinate 50 mg daily, mexiletine 150 mg q8H, pravastatin 80 mg daily, and Entresto twice daily.  Patient is  considered stable from a cardiac perspective for discharge.  Recommend she follow-up with her cardiologist, Dr. Moscoso in 2 weeks.  Thank you for the consult, we will sign off at this time.  Please not hesitate to reach out with any further concerns.      I spent 35 minutes in the professional and overall care of this patient.      Gay Galindo PA-C         [1] amiodarone, 200 mg, oral, BID  aspirin, 81 mg, oral, Daily  budesonide, 0.5 mg, nebulization, BID  cholecalciferol, 25 mcg, oral, Daily  dapagliflozin propanediol, 10 mg, oral, Daily  docusate sodium, 100 mg, oral, BID  enoxaparin, 40 mg, subcutaneous, Daily  ezetimibe, 10 mg, oral, Daily  furosemide, 40 mg, oral, Daily  ipratropium-albuteroL, 3 mL, nebulization, Once  ipratropium-albuteroL, 3 mL, nebulization, TID  melatonin, 3 mg, oral, Nightly  metoprolol succinate XL, 50 mg, oral, Daily  mexiletine, 150 mg, oral, q8h  nicotine, 1 patch, transdermal, Daily  pantoprazole, 40 mg, oral, Daily before breakfast   Or  pantoprazole, 40 mg, intravenous, Daily before breakfast  polyethylene glycol, 17 g, oral, Daily  potassium chloride CR, 20 mEq, oral, Daily  pravastatin, 80 mg, oral, Daily  predniSONE, 40 mg, oral, Daily  sacubitriL-valsartan, 0.5 tablet, oral, BID  [2]    [3] PRN medications: acetaminophen **OR** acetaminophen **OR** acetaminophen, benzocaine-menthol, dextromethorphan-guaifenesin, guaiFENesin, hydrOXYzine HCL, ondansetron **OR** ondansetron, oxygen

## 2025-07-14 NOTE — NURSING NOTE
COPD Education:    Returned to see patient to discuss/review meds to beds. Patient in agreement. Discussed inhalers patient to go home on. Meds to beds updated to yes.   Patient reports she would like to try quitting as she has a few days start and would like to continue using a nicotine patch at home. Request sent to pulmonology via chat for RX for home going nicotine patch.

## 2025-07-14 NOTE — PROGRESS NOTES
07/14/25 1231   Discharge Planning   Home or Post Acute Services None   Expected Discharge Disposition Home     Cleared by [facility] for home self care

## 2025-07-14 NOTE — CONSULTS
COPD Education  Comorbidities: COPD, CHF, Tobacco use, HTN, PCI - LAD 6/2022, ICD for V-tach, EF 20%    Exacerbation within last year: None  Moderate: >= 2 exacerbations or >= 1 exacerbation leading to hospitalization    Spirometry: No PFT's on file. Per patient - no diagnosis of COPD, no PFT's done    Uses of Oxygen/CPAP/BIPAP: No  Pulse Ox at home: No    Smoking status:   Smoker: Current Quit date: N/A Smoking cessation counseling: Yes             Quitting Smoking or Tobacco Use pt and family education sheet provided: Yes. Reviewed  Tobacco Cessation Program information. Phone number provided. States she has quit many times but has restarted smoking on each occasion. Reports  quit 5 years ago, and she smokes in the basement. Reports that she has nicotine patches to use but is not certain she is completely ready to quit at this time.    Pulmonary physician: None                   Maintenance medications   LABA, LAMA, LABA/LAMA, ICS/LABA, ICS/LAMA, ICS/LABA/LAMA    Name of the medications: Patient reports she is not on any inhalers; reports shortness of breath due to CHF.        Allergies:              COPD Education   COPD patient education book: Patient declined need.    COPD Coordinator contact information provided to patient.

## 2025-07-14 NOTE — DISCHARGE SUMMARY
Discharge Diagnosis  Acute on chronic congestive heart failure, unspecified heart failure type    Issues Requiring Follow-Up  COPD  Hypoxia  Nicotine dependence    Test Results Pending At Discharge  Pending Labs       No current pending labs.            Hospital Course   Nevaeh Pfeiffer is a 79 y.o. female presenting with shortness of breath.  Patient has history of ischemic cardiomyopathy and COPD and continues to smoke but does not use oxygen.  She came with complaints of shortness of breath with hypoxia and 80s on room air via EMS.  She has been feeling weak and stressed out because of her 's illness but shortness of breath came up suddenly.  She does have some chest congestion cough denies any fever and chills   Past medical history: MI, pacemaker/defibrillator, ischemic cardiomyopathy, ejection fraction 20%, AAA 4.6 x 3.8,  Occupation: Retired volunteer at school  Social history: Smokes denies drinking denies drugs  Treated with IV Lasix switched to oral Lasix  Treated with steroids antibiotics and breathing treatment  Discharged home in stable condition oxygen weaned off  Advised patient to quit smoking        Visit Vitals  /73 (BP Location: Left arm, Patient Position: Lying)   Pulse 60   Temp 36.2 °C (97.2 °F) (Oral)   Resp 20     Vitals:    07/11/25 0115   Weight: 63.5 kg (140 lb)       Immunization History   Administered Date(s) Administered    COVID-19, mRNA, LNP-S, PF, 30 mcg/0.3 mL dose 03/16/2021, 04/15/2021, 11/22/2021    Flu vaccine, quadrivalent, high-dose, preservative free, age 65y+ (FLUZONE) 10/16/2020, 11/18/2021, 09/20/2022    Flu vaccine, trivalent, preservative free, HIGH-DOSE, age 65y+ (Fluzone) 01/15/2018, 09/19/2018, 11/11/2019, 11/18/2021, 09/20/2022, 11/06/2024    Flu vaccine, trivalent, preservative free, age 6 months and greater (Fluarix/Fluzone/Flulaval) 10/02/2015    Influenza, Unspecified 10/22/2010, 10/21/2011, 10/18/2012, 10/10/2014    Influenza, injectable, quadrivalent  10/01/2016    Influenza, seasonal, injectable 11/01/2013    Novel influenza-H1N1-09, preservative-free 12/13/2009    Pfizer COVID-19 vaccine, bivalent, age 12 years and older (30 mcg/0.3 mL) 03/21/2023    Pneumococcal conjugate vaccine, 13-valent (PREVNAR 13) 11/11/2019    Pneumococcal polysaccharide vaccine, 23-valent, age 2 years and older (PNEUMOVAX 23) 06/22/2022       Results        Pertinent Physical Exam At Time of Discharge  Physical Exam  Vitals reviewed.   Constitutional:       Appearance: Normal appearance.   HENT:      Head: Normocephalic and atraumatic.      Right Ear: Tympanic membrane, ear canal and external ear normal.      Left Ear: Tympanic membrane, ear canal and external ear normal.      Nose: Nose normal.      Mouth/Throat:      Pharynx: Oropharynx is clear.   Eyes:      Extraocular Movements: Extraocular movements intact.      Conjunctiva/sclera: Conjunctivae normal.      Pupils: Pupils are equal, round, and reactive to light.   Cardiovascular:      Rate and Rhythm: Normal rate and regular rhythm.      Pulses: Normal pulses.      Heart sounds: Normal heart sounds.   Pulmonary:      Effort: Pulmonary effort is normal.      Breath sounds: Normal breath sounds.   Abdominal:      General: Abdomen is flat. Bowel sounds are normal.      Palpations: Abdomen is soft.   Musculoskeletal:      Cervical back: Normal range of motion and neck supple.   Skin:     General: Skin is warm and dry.   Neurological:      General: No focal deficit present.      Mental Status: She is alert and oriented to person, place, and time.   Psychiatric:         Mood and Affect: Mood normal.         Home Medications     Medication List      START taking these medications     albuterol 90 mcg/actuation inhaler; Commonly known as: ProAir HFA;   Inhale 2 puffs every 4 hours if needed for wheezing or shortness of   breath.   hydrOXYzine HCL 25 mg tablet; Commonly known as: Atarax; Take 1 tablet   (25 mg) by mouth every 6 hours if  needed for anxiety.   ipratropium-albuteroL 0.5-2.5 mg/3 mL nebulizer solution; Commonly known   as: Duo-Neb; Take 3 mL by nebulization 3 times a day.   nicotine 14 mg/24 hr patch; Commonly known as: Nicoderm CQ; Place 1   patch over 24 hours on the skin once daily.; Start taking on: July 15,   2025   potassium chloride CR 20 mEq ER tablet; Commonly known as: Klor-Con M20;   Take 1 tablet (20 mEq) by mouth once daily. Do not crush or chew.; Start   taking on: July 15, 2025   predniSONE 20 mg tablet; Commonly known as: Deltasone; Take 2 tablets   (40 mg) by mouth once daily for 1 dose.; Start taking on: July 15, 2025   spironolactone 25 mg tablet; Commonly known as: Aldactone; Take 1/2 a   tablet (12.5 mg) by mouth once daily. Do not start before July 15, 2025.;   Start taking on: July 15, 2025   torsemide 10 mg tablet; Commonly known as: Demadex; Take 1 tablet (10   mg) by mouth once daily. Do not start before July 15, 2025.; Start taking   on: July 15, 2025   Trelegy Ellipta 100-62.5-25 mcg blister with device; Generic drug:   fluticasone-umeclidin-vilanter; Inhale 1 puff once daily.   vericiguat 2.5 mg tablet; Take 1 tablet (2.5 mg) by mouth once daily for   14 days, THEN 5 mg once daily for 14 days, Then 10 mg once daily.; Start   taking on: July 12, 2025     CONTINUE taking these medications     amiodarone 200 mg tablet; Commonly known as: Pacerone; Take 1 tablet   (200 mg) by mouth 2 times a day.   aspirin 81 mg EC tablet   coenzyme Q-10 100 mg capsule   ezetimibe 10 mg tablet; Commonly known as: Zetia; Take 1 tablet (10 mg)   by mouth once daily.   Farxiga 10 mg tablet; Generic drug: dapagliflozin propanediol; Take 1   tablet (10 mg) by mouth once daily.   metoprolol succinate XL 50 mg 24 hr tablet; Commonly known as:   Toprol-XL; Take 1 tablet (50 mg) by mouth once daily. Do not crush or   chew.   mexiletine 150 mg capsule; Commonly known as: Mexitil; Take 1 capsule   (150 mg) by mouth every 8 hours.    pantoprazole 40 mg EC tablet; Commonly known as: ProtoNix; Take 1 tablet   (40 mg) by mouth once daily. Do not crush, chew, or split.   pravastatin 80 mg tablet; Commonly known as: Pravachol; Take 1 tablet   (80 mg) by mouth once daily.   sacubitriL-valsartan 24-26 mg tablet; Commonly known as: Entresto; Take   0.5 tablets by mouth 2 times a day.   VITAMIN D3 ORAL       Outpatient Follow-Up  Future Appointments   Date Time Provider Department Center   7/24/2025  3:00 PM Amos Moscoso MD [id number] [facility]   8/12/2025  9:45 AM Marissa Ojeda MD [id number] [facility]   11/6/2025  3:30 PM Amos Moscoso MD [id number] [facility]       Nayla Thurman MD

## 2025-07-14 NOTE — TREATMENT PLAN
Pulmonary will sign off, triple inhaler therapy ordered for when patient discharges and finished 5 day course of prednisone. Ideally patient should go home on a nicotine patch but she has no desire to quit smoking. Education provided   Follow up for annual CT chest in 12 weeks   Follow up with pulmonary outpatient  Home o2 eval prior to discharge

## 2025-07-15 ENCOUNTER — PHARMACY VISIT (OUTPATIENT)
Dept: PHARMACY | Facility: CLINIC | Age: 80
End: 2025-07-15
Payer: COMMERCIAL

## 2025-07-15 NOTE — PROGRESS NOTES
"Nevaeh Pfeiffer is a 79 y.o. female on day 3 of admission presenting with Acute on chronic congestive heart failure, unspecified heart failure type.    Subjective   Patient is requiring less oxygen       Objective     Physical Exam  Vitals reviewed.   Constitutional:       Appearance: Normal appearance.   HENT:      Head: Normocephalic and atraumatic.      Right Ear: Tympanic membrane, ear canal and external ear normal.      Left Ear: Tympanic membrane, ear canal and external ear normal.      Nose: Nose normal.      Mouth/Throat:      Pharynx: Oropharynx is clear.   Eyes:      Extraocular Movements: Extraocular movements intact.      Conjunctiva/sclera: Conjunctivae normal.      Pupils: Pupils are equal, round, and reactive to light.   Cardiovascular:      Rate and Rhythm: Normal rate and regular rhythm.      Pulses: Normal pulses.      Heart sounds: Normal heart sounds.   Pulmonary:      Effort: Pulmonary effort is normal.      Breath sounds: Rhonchi present.   Abdominal:      General: Abdomen is flat. Bowel sounds are normal.      Palpations: Abdomen is soft.   Musculoskeletal:      Cervical back: Normal range of motion and neck supple.   Skin:     General: Skin is warm and dry.   Neurological:      General: No focal deficit present.      Mental Status: She is alert and oriented to person, place, and time.   Psychiatric:         Mood and Affect: Mood normal.         Last Recorded Vitals  Blood pressure 119/73, pulse 60, temperature 36.2 °C (97.2 °F), temperature source Oral, resp. rate 20, height 1.626 m (5' 4\"), weight 63.5 kg (140 lb), SpO2 91%.  Intake/Output last 3 Shifts:  No intake/output data recorded.    Relevant Results               Results for orders placed or performed during the hospital encounter of 07/10/25 (from the past 24 hours)   Procalcitonin   Result Value Ref Range    Procalcitonin 0.02 <=0.07 ng/mL   CBC   Result Value Ref Range    WBC 13.3 (H) 4.4 - 11.3 x10*3/uL    nRBC 0.0 0.0 - 0.0 /100 " WBCs    RBC 4.79 4.00 - 5.20 x10*6/uL    Hemoglobin 14.9 12.0 - 16.0 g/dL    Hematocrit 44.3 36.0 - 46.0 %    MCV 93 80 - 100 fL    MCH 31.1 26.0 - 34.0 pg    MCHC 33.6 32.0 - 36.0 g/dL    RDW 14.8 (H) 11.5 - 14.5 %    Platelets 303 150 - 450 x10*3/uL   Basic Metabolic Panel   Result Value Ref Range    Glucose 88 74 - 99 mg/dL    Sodium 140 136 - 145 mmol/L    Potassium 3.6 3.5 - 5.3 mmol/L    Chloride 105 98 - 107 mmol/L    Bicarbonate 30 21 - 32 mmol/L    Anion Gap 9 (L) 10 - 20 mmol/L    Urea Nitrogen 30 (H) 6 - 23 mg/dL    Creatinine 0.86 0.50 - 1.05 mg/dL    eGFR 69 >60 mL/min/1.73m*2    Calcium 8.7 8.6 - 10.3 mg/dL     No results found.                   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      Nayla Thurman MD

## 2025-07-16 DIAGNOSIS — I50.22 HEART FAILURE, SYSTOLIC, CHRONIC: Primary | ICD-10-CM

## 2025-07-16 LAB
ATRIAL RATE: 67 BPM
P AXIS: 52 DEGREES
P OFFSET: 161 MS
P ONSET: 99 MS
PR INTERVAL: 194 MS
Q ONSET: 196 MS
QRS COUNT: 11 BEATS
QRS DURATION: 166 MS
QT INTERVAL: 444 MS
QTC CALCULATION(BAZETT): 469 MS
QTC FREDERICIA: 461 MS
R AXIS: -72 DEGREES
T AXIS: 87 DEGREES
T OFFSET: 418 MS
VENTRICULAR RATE: 67 BPM

## 2025-07-17 ENCOUNTER — PATIENT OUTREACH (OUTPATIENT)
Dept: CASE MANAGEMENT | Facility: HOSPITAL | Age: 80
End: 2025-07-17
Payer: MEDICARE

## 2025-07-17 NOTE — PROGRESS NOTES
Heart Failure Nurse Navigator Transition of Care Phone Call    The role of the HF nurse navigator is to (1) characterize risk profiles of patients with heart failure transitioning from [id number] after hospitalization, (2) recommend interventions to improve care and reduce risks of worse post-hospitalization outcomes.     HF Nurse Navigator outreach to patient via phone call to review HF education and check on progress. No answer, message left with contact information to return my call.     Pt is scheduled for follow up [facility] Dr. Moscoso on 7/24/25 at 3pm at [facility].    Sirisha Maciel RN BSN  [facility] Clinical Nurse Navigator, CHF  [phone]    Current GDMT: If NOT, please note reason in 'wildcard' at bottom of each list  ARNI/ACEI/ARB: YesSacubitril-valsartan 24-26 mg 0.5 tablet BID  BB: Yes Metoprolol succinate 50 mg daily  MRA: Yes spironolactone 25 mg daily  SGLT2i: Yes dapaglifozin 10 mg daily  Is the patient prescribed a diuretic? Yes  Torsemide 10 mg daily

## 2025-07-22 ENCOUNTER — HOSPITAL ENCOUNTER (OUTPATIENT)
Dept: CARDIOLOGY | Facility: CLINIC | Age: 80
Discharge: HOME | End: 2025-07-22
Payer: MEDICARE

## 2025-07-22 DIAGNOSIS — Z95.810 PRESENCE OF AUTOMATIC (IMPLANTABLE) CARDIAC DEFIBRILLATOR: ICD-10-CM

## 2025-07-22 DIAGNOSIS — I42.5 OTHER RESTRICTIVE CARDIOMYOPATHY: ICD-10-CM

## 2025-07-22 PROCEDURE — 93296 REM INTERROG EVL PM/IDS: CPT

## 2025-07-23 DIAGNOSIS — J44.1 COPD EXACERBATION (MULTI): ICD-10-CM

## 2025-07-23 DIAGNOSIS — I50.9 ACUTE ON CHRONIC CONGESTIVE HEART FAILURE, UNSPECIFIED HEART FAILURE TYPE: Primary | ICD-10-CM

## 2025-07-24 ENCOUNTER — PATIENT OUTREACH (OUTPATIENT)
Dept: CASE MANAGEMENT | Facility: HOSPITAL | Age: 80
End: 2025-07-24

## 2025-07-24 ENCOUNTER — APPOINTMENT (OUTPATIENT)
Dept: CARDIOLOGY | Facility: CLINIC | Age: 80
End: 2025-07-24
Payer: MEDICARE

## 2025-07-24 NOTE — PROGRESS NOTES
Heart Failure Nurse Navigator Transition of Care Phone Call    The role of the HF nurse navigator is to (1) characterize risk profiles of patients with heart failure transitioning from [id number] after hospitalization, (2) recommend interventions to improve care and reduce risks of worse post-hospitalization outcomes.     HF Nurse Navigator outreach to patient via phone call to review HF education and check on progress. No answer, message left with contact information to return my call.     Sirisha Maciel RN BSN  [facility] Clinical Nurse Navigator, CHF  [phone]

## 2025-08-04 ENCOUNTER — PATIENT OUTREACH (OUTPATIENT)
Dept: CASE MANAGEMENT | Facility: HOSPITAL | Age: 80
End: 2025-08-04
Payer: MEDICARE

## 2025-08-04 NOTE — PROGRESS NOTES
Heart Failure Nurse Navigator Transition of Care Phone Call    The role of the HF nurse navigator is to (1) characterize risk profiles of patients with heart failure transitioning from [id number] after hospitalization, (2) recommend interventions to improve care and reduce risks of worse post-hospitalization outcomes.     HF Nurse Navigator outreach to patient via phone call to review HF education and check on progress. No answer, message left with contact information to return my call.     Pt was N/S to scheduled Cards follow up with Dr. Moscoso on 7/24/25.    Sirisha Maciel RN BSN  [facility] Clinical Nurse Navigator, CHF  [phone]

## 2025-08-06 ENCOUNTER — PATIENT OUTREACH (OUTPATIENT)
Dept: CARE COORDINATION | Facility: CLINIC | Age: 80
End: 2025-08-06
Payer: MEDICARE

## 2025-08-12 ENCOUNTER — TELEPHONE (OUTPATIENT)
Dept: CARDIOLOGY | Facility: CLINIC | Age: 80
End: 2025-08-12

## 2025-08-12 ENCOUNTER — APPOINTMENT (OUTPATIENT)
Dept: CARDIOLOGY | Facility: CLINIC | Age: 80
End: 2025-08-12
Payer: MEDICARE

## 2025-08-14 ENCOUNTER — PATIENT OUTREACH (OUTPATIENT)
Dept: CARE COORDINATION | Facility: CLINIC | Age: 80
End: 2025-08-14
Payer: MEDICARE

## 2025-08-15 ENCOUNTER — PATIENT OUTREACH (OUTPATIENT)
Dept: CASE MANAGEMENT | Facility: HOSPITAL | Age: 80
End: 2025-08-15
Payer: MEDICARE

## 2025-08-21 DIAGNOSIS — I50.23 ACUTE ON CHRONIC SYSTOLIC HEART FAILURE: Primary | ICD-10-CM

## 2025-08-21 DIAGNOSIS — I50.23 ACUTE ON CHRONIC HEART FAILURE WITH REDUCED EJECTION FRACTION (HFREF, <= 40%): Primary | ICD-10-CM

## 2025-08-21 RX ORDER — VERICIGUAT 10 MG/1
10 TABLET, FILM COATED ORAL DAILY
Qty: 30 TABLET | Refills: 11 | Status: SHIPPED | OUTPATIENT
Start: 2025-08-21 | End: 2026-08-21

## 2025-08-21 RX ORDER — VERICIGUAT 5 MG/1
5 TABLET, FILM COATED ORAL DAILY
Qty: 14 TABLET | Refills: 0 | Status: SHIPPED | OUTPATIENT
Start: 2025-08-21 | End: 2025-09-04

## 2025-08-25 ENCOUNTER — HOSPITAL ENCOUNTER (OUTPATIENT)
Dept: CARDIOLOGY | Facility: CLINIC | Age: 80
Discharge: HOME | End: 2025-08-25
Payer: MEDICARE

## 2025-08-25 DIAGNOSIS — I42.5 OTHER RESTRICTIVE CARDIOMYOPATHY: ICD-10-CM

## 2025-08-25 DIAGNOSIS — Z95.810 PRESENCE OF AUTOMATIC (IMPLANTABLE) CARDIAC DEFIBRILLATOR: ICD-10-CM

## 2025-08-28 ENCOUNTER — TELEPHONE (OUTPATIENT)
Dept: SCHEDULING | Age: 80
End: 2025-08-28
Payer: MEDICARE

## 2025-09-09 ENCOUNTER — APPOINTMENT (OUTPATIENT)
Age: 80
End: 2025-09-09
Payer: MEDICARE

## 2025-11-06 ENCOUNTER — APPOINTMENT (OUTPATIENT)
Dept: CARDIOLOGY | Facility: CLINIC | Age: 80
End: 2025-11-06
Payer: MEDICARE

## 2026-01-22 ENCOUNTER — APPOINTMENT (OUTPATIENT)
Dept: CARDIOLOGY | Facility: CLINIC | Age: 81
End: 2026-01-22
Payer: MEDICARE